# Patient Record
Sex: MALE | Race: WHITE | Employment: OTHER | URBAN - METROPOLITAN AREA
[De-identification: names, ages, dates, MRNs, and addresses within clinical notes are randomized per-mention and may not be internally consistent; named-entity substitution may affect disease eponyms.]

---

## 2019-12-04 ENCOUNTER — APPOINTMENT (OUTPATIENT)
Dept: GENERAL RADIOLOGY | Age: 69
DRG: 313 | End: 2019-12-04
Payer: OTHER GOVERNMENT

## 2019-12-04 ENCOUNTER — HOSPITAL ENCOUNTER (INPATIENT)
Age: 69
LOS: 1 days | Discharge: HOME | DRG: 313 | End: 2019-12-05
Attending: EMERGENCY MEDICINE | Admitting: INTERNAL MEDICINE
Payer: OTHER GOVERNMENT

## 2019-12-04 DIAGNOSIS — I24.9 ACUTE CORONARY SYNDROME (HCC): Primary | ICD-10-CM

## 2019-12-04 DIAGNOSIS — R07.9 ACUTE CHEST PAIN: ICD-10-CM

## 2019-12-04 PROBLEM — I21.4 NSTEMI (NON-ST ELEVATED MYOCARDIAL INFARCTION) (HCC): Status: ACTIVE | Noted: 2019-12-04

## 2019-12-04 LAB
ANION GAP SERPL CALCULATED.3IONS-SCNC: 21 MMOL/L (ref 3–16)
APTT: 33.6 SEC (ref 24.2–36.2)
BASOPHILS ABSOLUTE: 0.1 K/UL (ref 0–0.2)
BASOPHILS RELATIVE PERCENT: 1.7 %
BUN BLDV-MCNC: 9 MG/DL (ref 7–20)
CALCIUM SERPL-MCNC: 8.6 MG/DL (ref 8.3–10.6)
CHLORIDE BLD-SCNC: 103 MMOL/L (ref 99–110)
CO2: 19 MMOL/L (ref 21–32)
CREAT SERPL-MCNC: 0.9 MG/DL (ref 0.8–1.3)
EOSINOPHILS ABSOLUTE: 0 K/UL (ref 0–0.6)
EOSINOPHILS RELATIVE PERCENT: 0.1 %
GFR AFRICAN AMERICAN: >60
GFR NON-AFRICAN AMERICAN: >60
GLUCOSE BLD-MCNC: 94 MG/DL (ref 70–99)
HCT VFR BLD CALC: 39 % (ref 40.5–52.5)
HEMOGLOBIN: 13.1 G/DL (ref 13.5–17.5)
INR BLD: 1.05 (ref 0.86–1.14)
LYMPHOCYTES ABSOLUTE: 0.3 K/UL (ref 1–5.1)
LYMPHOCYTES RELATIVE PERCENT: 6.5 %
MCH RBC QN AUTO: 31.6 PG (ref 26–34)
MCHC RBC AUTO-ENTMCNC: 33.7 G/DL (ref 31–36)
MCV RBC AUTO: 93.8 FL (ref 80–100)
MONOCYTES ABSOLUTE: 0.3 K/UL (ref 0–1.3)
MONOCYTES RELATIVE PERCENT: 4.8 %
NEUTROPHILS ABSOLUTE: 4.7 K/UL (ref 1.7–7.7)
NEUTROPHILS RELATIVE PERCENT: 86.9 %
PDW BLD-RTO: 21.2 % (ref 12.4–15.4)
PLATELET # BLD: 269 K/UL (ref 135–450)
PMV BLD AUTO: 7.8 FL (ref 5–10.5)
POTASSIUM SERPL-SCNC: 4.3 MMOL/L (ref 3.5–5.1)
PRO-BNP: 379 PG/ML (ref 0–124)
PROTHROMBIN TIME: 12.2 SEC (ref 10–13.2)
RBC # BLD: 4.16 M/UL (ref 4.2–5.9)
SODIUM BLD-SCNC: 143 MMOL/L (ref 136–145)
TROPONIN: 0.05 NG/ML
TROPONIN: 0.05 NG/ML
WBC # BLD: 5.4 K/UL (ref 4–11)

## 2019-12-04 PROCEDURE — 6360000002 HC RX W HCPCS: Performed by: PHYSICIAN ASSISTANT

## 2019-12-04 PROCEDURE — 80048 BASIC METABOLIC PNL TOTAL CA: CPT

## 2019-12-04 PROCEDURE — 93005 ELECTROCARDIOGRAM TRACING: CPT | Performed by: EMERGENCY MEDICINE

## 2019-12-04 PROCEDURE — 85025 COMPLETE CBC W/AUTO DIFF WBC: CPT

## 2019-12-04 PROCEDURE — 85610 PROTHROMBIN TIME: CPT

## 2019-12-04 PROCEDURE — 36415 COLL VENOUS BLD VENIPUNCTURE: CPT

## 2019-12-04 PROCEDURE — 93005 ELECTROCARDIOGRAM TRACING: CPT | Performed by: PHYSICIAN ASSISTANT

## 2019-12-04 PROCEDURE — 71046 X-RAY EXAM CHEST 2 VIEWS: CPT

## 2019-12-04 PROCEDURE — 96374 THER/PROPH/DIAG INJ IV PUSH: CPT

## 2019-12-04 PROCEDURE — 99285 EMERGENCY DEPT VISIT HI MDM: CPT

## 2019-12-04 PROCEDURE — 84484 ASSAY OF TROPONIN QUANT: CPT

## 2019-12-04 PROCEDURE — 2580000003 HC RX 258: Performed by: PHYSICIAN ASSISTANT

## 2019-12-04 PROCEDURE — 83880 ASSAY OF NATRIURETIC PEPTIDE: CPT

## 2019-12-04 PROCEDURE — 6370000000 HC RX 637 (ALT 250 FOR IP): Performed by: EMERGENCY MEDICINE

## 2019-12-04 PROCEDURE — 2060000000 HC ICU INTERMEDIATE R&B

## 2019-12-04 PROCEDURE — 6370000000 HC RX 637 (ALT 250 FOR IP): Performed by: PHYSICIAN ASSISTANT

## 2019-12-04 PROCEDURE — 85730 THROMBOPLASTIN TIME PARTIAL: CPT

## 2019-12-04 RX ORDER — DOXYCYCLINE HYCLATE 100 MG/1
100 CAPSULE ORAL 2 TIMES DAILY
Status: ON HOLD | COMMUNITY
End: 2020-02-14 | Stop reason: HOSPADM

## 2019-12-04 RX ORDER — NIFEDIPINE 30 MG/1
30 TABLET, EXTENDED RELEASE ORAL DAILY
COMMUNITY

## 2019-12-04 RX ORDER — PANTOPRAZOLE SODIUM 40 MG/1
40 TABLET, DELAYED RELEASE ORAL DAILY
Status: ON HOLD | COMMUNITY
End: 2020-12-01 | Stop reason: ALTCHOICE

## 2019-12-04 RX ORDER — ASPIRIN 81 MG/1
324 TABLET, CHEWABLE ORAL ONCE
Status: COMPLETED | OUTPATIENT
Start: 2019-12-04 | End: 2019-12-04

## 2019-12-04 RX ORDER — TRAZODONE HYDROCHLORIDE 50 MG/1
50 TABLET ORAL NIGHTLY
COMMUNITY

## 2019-12-04 RX ORDER — METOPROLOL SUCCINATE 50 MG/1
50 TABLET, EXTENDED RELEASE ORAL DAILY
Status: ON HOLD | COMMUNITY
End: 2020-12-02 | Stop reason: SDUPTHER

## 2019-12-04 RX ORDER — 0.9 % SODIUM CHLORIDE 0.9 %
1000 INTRAVENOUS SOLUTION INTRAVENOUS ONCE
Status: COMPLETED | OUTPATIENT
Start: 2019-12-04 | End: 2019-12-04

## 2019-12-04 RX ADMIN — ASPIRIN 81 MG 324 MG: 81 TABLET ORAL at 18:30

## 2019-12-04 RX ADMIN — NITROGLYCERIN 0.5 INCH: 20 OINTMENT TOPICAL at 23:17

## 2019-12-04 RX ADMIN — SODIUM CHLORIDE 1000 ML: 9 INJECTION, SOLUTION INTRAVENOUS at 18:31

## 2019-12-04 RX ADMIN — ENOXAPARIN SODIUM 80 MG: 80 INJECTION SUBCUTANEOUS at 20:11

## 2019-12-04 ASSESSMENT — HEART SCORE: ECG: 1

## 2019-12-05 ENCOUNTER — APPOINTMENT (OUTPATIENT)
Dept: CT IMAGING | Age: 69
DRG: 313 | End: 2019-12-05
Payer: OTHER GOVERNMENT

## 2019-12-05 VITALS
WEIGHT: 174 LBS | SYSTOLIC BLOOD PRESSURE: 177 MMHG | HEART RATE: 104 BPM | BODY MASS INDEX: 26.37 KG/M2 | OXYGEN SATURATION: 94 % | RESPIRATION RATE: 16 BRPM | TEMPERATURE: 98.1 F | DIASTOLIC BLOOD PRESSURE: 96 MMHG | HEIGHT: 68 IN

## 2019-12-05 PROBLEM — R79.89 ELEVATED D-DIMER: Status: ACTIVE | Noted: 2019-12-05

## 2019-12-05 PROBLEM — F32.A DEPRESSION: Status: ACTIVE | Noted: 2019-12-05

## 2019-12-05 PROBLEM — K21.9 GERD (GASTROESOPHAGEAL REFLUX DISEASE): Status: ACTIVE | Noted: 2019-12-05

## 2019-12-05 PROBLEM — I10 HTN (HYPERTENSION): Status: ACTIVE | Noted: 2019-12-05

## 2019-12-05 LAB
A/G RATIO: 1.1 (ref 1.1–2.2)
ALBUMIN SERPL-MCNC: 3.9 G/DL (ref 3.4–5)
ALP BLD-CCNC: 112 U/L (ref 40–129)
ALT SERPL-CCNC: 55 U/L (ref 10–40)
ANION GAP SERPL CALCULATED.3IONS-SCNC: 20 MMOL/L (ref 3–16)
AST SERPL-CCNC: 78 U/L (ref 15–37)
BASOPHILS ABSOLUTE: 0.1 K/UL (ref 0–0.2)
BASOPHILS RELATIVE PERCENT: 1.5 %
BILIRUB SERPL-MCNC: 1 MG/DL (ref 0–1)
BUN BLDV-MCNC: 12 MG/DL (ref 7–20)
CALCIUM SERPL-MCNC: 8.3 MG/DL (ref 8.3–10.6)
CHLORIDE BLD-SCNC: 102 MMOL/L (ref 99–110)
CHOLESTEROL, TOTAL: 148 MG/DL (ref 0–199)
CO2: 19 MMOL/L (ref 21–32)
CREAT SERPL-MCNC: 0.9 MG/DL (ref 0.8–1.3)
D DIMER: 2348 NG/ML DDU (ref 0–229)
EKG ATRIAL RATE: 105 BPM
EKG ATRIAL RATE: 98 BPM
EKG ATRIAL RATE: 99 BPM
EKG DIAGNOSIS: NORMAL
EKG P AXIS: 57 DEGREES
EKG P AXIS: 63 DEGREES
EKG P AXIS: 65 DEGREES
EKG P-R INTERVAL: 146 MS
EKG P-R INTERVAL: 148 MS
EKG P-R INTERVAL: 162 MS
EKG Q-T INTERVAL: 344 MS
EKG Q-T INTERVAL: 354 MS
EKG Q-T INTERVAL: 358 MS
EKG QRS DURATION: 82 MS
EKG QTC CALCULATION (BAZETT): 451 MS
EKG QTC CALCULATION (BAZETT): 454 MS
EKG QTC CALCULATION (BAZETT): 459 MS
EKG R AXIS: -1 DEGREES
EKG R AXIS: -18 DEGREES
EKG R AXIS: -24 DEGREES
EKG T AXIS: 15 DEGREES
EKG T AXIS: 23 DEGREES
EKG T AXIS: 31 DEGREES
EKG VENTRICULAR RATE: 105 BPM
EKG VENTRICULAR RATE: 98 BPM
EKG VENTRICULAR RATE: 99 BPM
EOSINOPHILS ABSOLUTE: 0 K/UL (ref 0–0.6)
EOSINOPHILS RELATIVE PERCENT: 0 %
GFR AFRICAN AMERICAN: >60
GFR NON-AFRICAN AMERICAN: >60
GLOBULIN: 3.4 G/DL
GLUCOSE BLD-MCNC: 126 MG/DL (ref 70–99)
GLUCOSE BLD-MCNC: 85 MG/DL (ref 70–99)
HCT VFR BLD CALC: 36.3 % (ref 40.5–52.5)
HDLC SERPL-MCNC: 65 MG/DL (ref 40–60)
HEMOGLOBIN: 12.2 G/DL (ref 13.5–17.5)
LDL CHOLESTEROL CALCULATED: 70 MG/DL
LEFT VENTRICULAR EJECTION FRACTION HIGH VALUE: 70 %
LEFT VENTRICULAR EJECTION FRACTION MODE: NORMAL
LV EF: 70 %
LV EF: 77 %
LVEF MODALITY: NORMAL
LVEF MODALITY: NORMAL
LYMPHOCYTES ABSOLUTE: 0.3 K/UL (ref 1–5.1)
LYMPHOCYTES RELATIVE PERCENT: 5.8 %
MCH RBC QN AUTO: 31.7 PG (ref 26–34)
MCHC RBC AUTO-ENTMCNC: 33.6 G/DL (ref 31–36)
MCV RBC AUTO: 94.3 FL (ref 80–100)
MONOCYTES ABSOLUTE: 0.5 K/UL (ref 0–1.3)
MONOCYTES RELATIVE PERCENT: 10 %
NEUTROPHILS ABSOLUTE: 4.4 K/UL (ref 1.7–7.7)
NEUTROPHILS RELATIVE PERCENT: 82.7 %
PDW BLD-RTO: 21.4 % (ref 12.4–15.4)
PERFORMED ON: ABNORMAL
PLATELET # BLD: 237 K/UL (ref 135–450)
PMV BLD AUTO: 8.5 FL (ref 5–10.5)
POTASSIUM REFLEX MAGNESIUM: 4.2 MMOL/L (ref 3.5–5.1)
RBC # BLD: 3.85 M/UL (ref 4.2–5.9)
SODIUM BLD-SCNC: 141 MMOL/L (ref 136–145)
TOTAL PROTEIN: 7.3 G/DL (ref 6.4–8.2)
TRIGL SERPL-MCNC: 64 MG/DL (ref 0–150)
TROPONIN: 0.05 NG/ML
TROPONIN: 0.05 NG/ML
VLDLC SERPL CALC-MCNC: 13 MG/DL
WBC # BLD: 5.3 K/UL (ref 4–11)

## 2019-12-05 PROCEDURE — 78452 HT MUSCLE IMAGE SPECT MULT: CPT | Performed by: INTERNAL MEDICINE

## 2019-12-05 PROCEDURE — 6360000002 HC RX W HCPCS: Performed by: INTERNAL MEDICINE

## 2019-12-05 PROCEDURE — 80053 COMPREHEN METABOLIC PANEL: CPT

## 2019-12-05 PROCEDURE — 93010 ELECTROCARDIOGRAM REPORT: CPT | Performed by: INTERNAL MEDICINE

## 2019-12-05 PROCEDURE — A9502 TC99M TETROFOSMIN: HCPCS | Performed by: INTERNAL MEDICINE

## 2019-12-05 PROCEDURE — 93017 CV STRESS TEST TRACING ONLY: CPT | Performed by: INTERNAL MEDICINE

## 2019-12-05 PROCEDURE — 93306 TTE W/DOPPLER COMPLETE: CPT

## 2019-12-05 PROCEDURE — 93005 ELECTROCARDIOGRAM TRACING: CPT | Performed by: INTERNAL MEDICINE

## 2019-12-05 PROCEDURE — 3430000000 HC RX DIAGNOSTIC RADIOPHARMACEUTICAL: Performed by: INTERNAL MEDICINE

## 2019-12-05 PROCEDURE — 96375 TX/PRO/DX INJ NEW DRUG ADDON: CPT

## 2019-12-05 PROCEDURE — 6360000004 HC RX CONTRAST MEDICATION: Performed by: INTERNAL MEDICINE

## 2019-12-05 PROCEDURE — 84484 ASSAY OF TROPONIN QUANT: CPT

## 2019-12-05 PROCEDURE — 6370000000 HC RX 637 (ALT 250 FOR IP): Performed by: INTERNAL MEDICINE

## 2019-12-05 PROCEDURE — 99222 1ST HOSP IP/OBS MODERATE 55: CPT | Performed by: INTERNAL MEDICINE

## 2019-12-05 PROCEDURE — 80061 LIPID PANEL: CPT

## 2019-12-05 PROCEDURE — 2580000003 HC RX 258: Performed by: INTERNAL MEDICINE

## 2019-12-05 PROCEDURE — 85379 FIBRIN DEGRADATION QUANT: CPT

## 2019-12-05 PROCEDURE — 71260 CT THORAX DX C+: CPT

## 2019-12-05 PROCEDURE — 85025 COMPLETE CBC W/AUTO DIFF WBC: CPT

## 2019-12-05 PROCEDURE — 36415 COLL VENOUS BLD VENIPUNCTURE: CPT

## 2019-12-05 RX ORDER — SODIUM CHLORIDE 0.9 % (FLUSH) 0.9 %
10 SYRINGE (ML) INJECTION PRN
Status: DISCONTINUED | OUTPATIENT
Start: 2019-12-05 | End: 2019-12-05 | Stop reason: HOSPADM

## 2019-12-05 RX ORDER — NITROGLYCERIN 0.4 MG/1
0.4 TABLET SUBLINGUAL EVERY 5 MIN PRN
Status: DISCONTINUED | OUTPATIENT
Start: 2019-12-05 | End: 2019-12-05 | Stop reason: HOSPADM

## 2019-12-05 RX ORDER — SODIUM CHLORIDE 0.9 % (FLUSH) 0.9 %
10 SYRINGE (ML) INJECTION EVERY 12 HOURS SCHEDULED
Status: DISCONTINUED | OUTPATIENT
Start: 2019-12-05 | End: 2019-12-05 | Stop reason: HOSPADM

## 2019-12-05 RX ORDER — POTASSIUM CHLORIDE 7.45 MG/ML
10 INJECTION INTRAVENOUS PRN
Status: DISCONTINUED | OUTPATIENT
Start: 2019-12-05 | End: 2019-12-05

## 2019-12-05 RX ORDER — LORAZEPAM 2 MG/ML
1 INJECTION INTRAMUSCULAR EVERY 4 HOURS PRN
Status: DISCONTINUED | OUTPATIENT
Start: 2019-12-05 | End: 2019-12-05 | Stop reason: HOSPADM

## 2019-12-05 RX ORDER — AMINOPHYLLINE DIHYDRATE 25 MG/ML
100 INJECTION, SOLUTION INTRAVENOUS ONCE
Status: COMPLETED | OUTPATIENT
Start: 2019-12-05 | End: 2019-12-05

## 2019-12-05 RX ORDER — ONDANSETRON 2 MG/ML
INJECTION INTRAMUSCULAR; INTRAVENOUS
Status: DISPENSED
Start: 2019-12-05 | End: 2019-12-05

## 2019-12-05 RX ORDER — 0.9 % SODIUM CHLORIDE 0.9 %
500 INTRAVENOUS SOLUTION INTRAVENOUS PRN
Status: DISCONTINUED | OUTPATIENT
Start: 2019-12-05 | End: 2019-12-05 | Stop reason: HOSPADM

## 2019-12-05 RX ORDER — METOPROLOL SUCCINATE 50 MG/1
50 TABLET, EXTENDED RELEASE ORAL DAILY
Status: DISCONTINUED | OUTPATIENT
Start: 2019-12-05 | End: 2019-12-05 | Stop reason: HOSPADM

## 2019-12-05 RX ORDER — POTASSIUM CHLORIDE 20 MEQ/1
40 TABLET, EXTENDED RELEASE ORAL PRN
Status: DISCONTINUED | OUTPATIENT
Start: 2019-12-05 | End: 2019-12-05 | Stop reason: HOSPADM

## 2019-12-05 RX ORDER — 0.9 % SODIUM CHLORIDE 0.9 %
500 INTRAVENOUS SOLUTION INTRAVENOUS PRN
Status: DISCONTINUED | OUTPATIENT
Start: 2019-12-05 | End: 2019-12-05

## 2019-12-05 RX ORDER — DOXYCYCLINE HYCLATE 100 MG
100 TABLET ORAL 2 TIMES DAILY
Status: DISCONTINUED | OUTPATIENT
Start: 2019-12-05 | End: 2019-12-05 | Stop reason: HOSPADM

## 2019-12-05 RX ORDER — ONDANSETRON 2 MG/ML
4 INJECTION INTRAMUSCULAR; INTRAVENOUS EVERY 6 HOURS PRN
Status: DISCONTINUED | OUTPATIENT
Start: 2019-12-05 | End: 2019-12-05 | Stop reason: HOSPADM

## 2019-12-05 RX ORDER — HYDRALAZINE HYDROCHLORIDE 20 MG/ML
10 INJECTION INTRAMUSCULAR; INTRAVENOUS EVERY 6 HOURS PRN
Status: DISCONTINUED | OUTPATIENT
Start: 2019-12-05 | End: 2019-12-05

## 2019-12-05 RX ORDER — NIFEDIPINE 30 MG/1
30 TABLET, EXTENDED RELEASE ORAL DAILY
Status: DISCONTINUED | OUTPATIENT
Start: 2019-12-05 | End: 2019-12-05 | Stop reason: HOSPADM

## 2019-12-05 RX ORDER — POTASSIUM CHLORIDE 7.45 MG/ML
10 INJECTION INTRAVENOUS PRN
Status: DISCONTINUED | OUTPATIENT
Start: 2019-12-05 | End: 2019-12-05 | Stop reason: HOSPADM

## 2019-12-05 RX ORDER — HYDRALAZINE HYDROCHLORIDE 20 MG/ML
10 INJECTION INTRAMUSCULAR; INTRAVENOUS EVERY 6 HOURS PRN
Status: DISCONTINUED | OUTPATIENT
Start: 2019-12-05 | End: 2019-12-05 | Stop reason: HOSPADM

## 2019-12-05 RX ORDER — POTASSIUM CHLORIDE 20 MEQ/1
40 TABLET, EXTENDED RELEASE ORAL PRN
Status: DISCONTINUED | OUTPATIENT
Start: 2019-12-05 | End: 2019-12-05

## 2019-12-05 RX ORDER — TRAZODONE HYDROCHLORIDE 50 MG/1
50 TABLET ORAL NIGHTLY
Status: DISCONTINUED | OUTPATIENT
Start: 2019-12-05 | End: 2019-12-05 | Stop reason: HOSPADM

## 2019-12-05 RX ORDER — PANTOPRAZOLE SODIUM 40 MG/1
40 TABLET, DELAYED RELEASE ORAL DAILY
Status: DISCONTINUED | OUTPATIENT
Start: 2019-12-05 | End: 2019-12-05 | Stop reason: HOSPADM

## 2019-12-05 RX ORDER — ATORVASTATIN CALCIUM 40 MG/1
40 TABLET, FILM COATED ORAL NIGHTLY
Status: DISCONTINUED | OUTPATIENT
Start: 2019-12-05 | End: 2019-12-05 | Stop reason: HOSPADM

## 2019-12-05 RX ADMIN — ONDANSETRON 4 MG: 2 INJECTION INTRAMUSCULAR; INTRAVENOUS at 07:39

## 2019-12-05 RX ADMIN — Medication 10 ML: at 09:40

## 2019-12-05 RX ADMIN — IOPAMIDOL 75 ML: 755 INJECTION, SOLUTION INTRAVENOUS at 18:04

## 2019-12-05 RX ADMIN — ONDANSETRON 4 MG: 2 INJECTION INTRAMUSCULAR; INTRAVENOUS at 00:35

## 2019-12-05 RX ADMIN — TETROFOSMIN 10 MILLICURIE: 1.38 INJECTION, POWDER, LYOPHILIZED, FOR SOLUTION INTRAVENOUS at 13:25

## 2019-12-05 RX ADMIN — AMINOPHYLLINE 100 MG: 25 INJECTION, SOLUTION INTRAVENOUS at 14:52

## 2019-12-05 RX ADMIN — HYDRALAZINE HYDROCHLORIDE 10 MG: 20 INJECTION INTRAMUSCULAR; INTRAVENOUS at 08:29

## 2019-12-05 RX ADMIN — REGADENOSON 0.4 MG: 0.08 INJECTION, SOLUTION INTRAVENOUS at 14:46

## 2019-12-05 RX ADMIN — METRONIDAZOLE: 7.5 CREAM TOPICAL at 11:41

## 2019-12-05 RX ADMIN — TETROFOSMIN 30 MILLICURIE: 1.38 INJECTION, POWDER, LYOPHILIZED, FOR SOLUTION INTRAVENOUS at 14:45

## 2019-12-05 ASSESSMENT — ENCOUNTER SYMPTOMS
ABDOMINAL PAIN: 0
RHINORRHEA: 0
CHEST TIGHTNESS: 1
BACK PAIN: 0
EYE PAIN: 0
SHORTNESS OF BREATH: 0
COUGH: 0
VOMITING: 0
DIARRHEA: 0
NAUSEA: 0
SHORTNESS OF BREATH: 1
EYE REDNESS: 0

## 2019-12-05 ASSESSMENT — PAIN SCALES - GENERAL
PAINLEVEL_OUTOF10: 0

## 2020-02-12 ENCOUNTER — APPOINTMENT (OUTPATIENT)
Dept: GENERAL RADIOLOGY | Age: 70
DRG: 101 | End: 2020-02-12
Payer: MEDICARE

## 2020-02-12 ENCOUNTER — HOSPITAL ENCOUNTER (INPATIENT)
Age: 70
LOS: 2 days | Discharge: HOME | DRG: 101 | End: 2020-02-14
Attending: EMERGENCY MEDICINE | Admitting: INTERNAL MEDICINE
Payer: MEDICARE

## 2020-02-12 ENCOUNTER — APPOINTMENT (OUTPATIENT)
Dept: CT IMAGING | Age: 70
DRG: 101 | End: 2020-02-12
Payer: MEDICARE

## 2020-02-12 PROBLEM — R56.9 SEIZURE (HCC): Status: ACTIVE | Noted: 2020-02-12

## 2020-02-12 LAB
A/G RATIO: 1.1 (ref 1.1–2.2)
ACETAMINOPHEN LEVEL: <5 UG/ML (ref 10–30)
ALBUMIN SERPL-MCNC: 3.7 G/DL (ref 3.4–5)
ALP BLD-CCNC: 82 U/L (ref 40–129)
ALT SERPL-CCNC: 27 U/L (ref 10–40)
AMMONIA: 104 UMOL/L (ref 16–60)
AMPHETAMINE SCREEN, URINE: NORMAL
ANION GAP SERPL CALCULATED.3IONS-SCNC: 30 MMOL/L (ref 3–16)
APTT: 21.3 SEC (ref 24.2–36.2)
AST SERPL-CCNC: 58 U/L (ref 15–37)
BARBITURATE SCREEN URINE: NORMAL
BASE EXCESS VENOUS: -13.8 MMOL/L (ref -3–3)
BASOPHILS ABSOLUTE: 0 K/UL (ref 0–0.2)
BASOPHILS RELATIVE PERCENT: 0.6 %
BENZODIAZEPINE SCREEN, URINE: NORMAL
BILIRUB SERPL-MCNC: 1.1 MG/DL (ref 0–1)
BILIRUBIN URINE: NEGATIVE
BLOOD, URINE: ABNORMAL
BUN BLDV-MCNC: 7 MG/DL (ref 7–20)
CALCIUM SERPL-MCNC: 7.6 MG/DL (ref 8.3–10.6)
CANNABINOID SCREEN URINE: NORMAL
CARBOXYHEMOGLOBIN: 3.8 % (ref 0–1.5)
CHLORIDE BLD-SCNC: 101 MMOL/L (ref 99–110)
CLARITY: ABNORMAL
CO2: 11 MMOL/L (ref 21–32)
COCAINE METABOLITE SCREEN URINE: NORMAL
COLOR: ABNORMAL
CREAT SERPL-MCNC: 0.8 MG/DL (ref 0.8–1.3)
EKG ATRIAL RATE: 100 BPM
EKG DIAGNOSIS: NORMAL
EKG P AXIS: 54 DEGREES
EKG P-R INTERVAL: 142 MS
EKG Q-T INTERVAL: 414 MS
EKG QRS DURATION: 88 MS
EKG QTC CALCULATION (BAZETT): 534 MS
EKG R AXIS: 7 DEGREES
EKG T AXIS: 13 DEGREES
EKG VENTRICULAR RATE: 100 BPM
EOSINOPHILS ABSOLUTE: 0 K/UL (ref 0–0.6)
EOSINOPHILS RELATIVE PERCENT: 0 %
EPITHELIAL CELLS, UA: 1 /HPF (ref 0–5)
ETHANOL: NORMAL MG/DL (ref 0–0.08)
GFR AFRICAN AMERICAN: >60
GFR NON-AFRICAN AMERICAN: >60
GLOBULIN: 3.3 G/DL
GLUCOSE BLD-MCNC: 162 MG/DL (ref 70–99)
GLUCOSE URINE: NEGATIVE MG/DL
HCO3 VENOUS: 10.3 MMOL/L (ref 23–29)
HCT VFR BLD CALC: 40.4 % (ref 40.5–52.5)
HEMOGLOBIN: 13.3 G/DL (ref 13.5–17.5)
HYALINE CASTS: 1 /LPF (ref 0–8)
INR BLD: 1.32 (ref 0.86–1.14)
KETONES, URINE: 15 MG/DL
LACTIC ACID: 1.5 MMOL/L (ref 0.4–2)
LACTIC ACID: 10.9 MMOL/L (ref 0.4–2)
LEUKOCYTE ESTERASE, URINE: NEGATIVE
LIPASE: 37 U/L (ref 13–60)
LYMPHOCYTES ABSOLUTE: 0.8 K/UL (ref 1–5.1)
LYMPHOCYTES RELATIVE PERCENT: 12.7 %
Lab: NORMAL
MCH RBC QN AUTO: 31.9 PG (ref 26–34)
MCHC RBC AUTO-ENTMCNC: 32.8 G/DL (ref 31–36)
MCV RBC AUTO: 97.1 FL (ref 80–100)
METHADONE SCREEN, URINE: NORMAL
METHEMOGLOBIN VENOUS: 0.2 %
MICROSCOPIC EXAMINATION: YES
MONOCYTES ABSOLUTE: 1 K/UL (ref 0–1.3)
MONOCYTES RELATIVE PERCENT: 17.3 %
NEUTROPHILS ABSOLUTE: 4.1 K/UL (ref 1.7–7.7)
NEUTROPHILS RELATIVE PERCENT: 69.4 %
NITRITE, URINE: NEGATIVE
O2 CONTENT, VEN: 16 VOL %
O2 SAT, VEN: 99 %
O2 THERAPY: ABNORMAL
OPIATE SCREEN URINE: NORMAL
OSMOLALITY: 294 MOSM/KG (ref 280–301)
OXYCODONE URINE: NORMAL
PCO2, VEN: 19.9 MMHG (ref 40–50)
PDW BLD-RTO: 17.2 % (ref 12.4–15.4)
PH UA: 6.5
PH UA: 6.5 (ref 5–8)
PH VENOUS: 7.32 (ref 7.35–7.45)
PHENCYCLIDINE SCREEN URINE: NORMAL
PLATELET # BLD: 118 K/UL (ref 135–450)
PMV BLD AUTO: 8.2 FL (ref 5–10.5)
PO2, VEN: 188 MMHG (ref 25–40)
POTASSIUM SERPL-SCNC: 2.4 MMOL/L (ref 3.5–5.1)
PRO-BNP: 1083 PG/ML (ref 0–124)
PROPOXYPHENE SCREEN: NORMAL
PROTEIN UA: 100 MG/DL
PROTHROMBIN TIME: 15.3 SEC (ref 10–13.2)
RBC # BLD: 4.16 M/UL (ref 4.2–5.9)
RBC UA: 17 /HPF (ref 0–4)
SALICYLATE, SERUM: <0.3 MG/DL (ref 15–30)
SODIUM BLD-SCNC: 142 MMOL/L (ref 136–145)
SPECIFIC GRAVITY UA: 1.02 (ref 1–1.03)
TCO2 CALC VENOUS: 24 MMOL/L
TOTAL CK: 367 U/L (ref 39–308)
TOTAL PROTEIN: 7 G/DL (ref 6.4–8.2)
TROPONIN: 0.02 NG/ML
URINE REFLEX TO CULTURE: ABNORMAL
URINE TYPE: ABNORMAL
UROBILINOGEN, URINE: 1 E.U./DL
WBC # BLD: 6 K/UL (ref 4–11)
WBC UA: 3 /HPF (ref 0–5)

## 2020-02-12 PROCEDURE — 2580000003 HC RX 258: Performed by: INTERNAL MEDICINE

## 2020-02-12 PROCEDURE — 83605 ASSAY OF LACTIC ACID: CPT

## 2020-02-12 PROCEDURE — 82803 BLOOD GASES ANY COMBINATION: CPT

## 2020-02-12 PROCEDURE — 94760 N-INVAS EAR/PLS OXIMETRY 1: CPT

## 2020-02-12 PROCEDURE — 71045 X-RAY EXAM CHEST 1 VIEW: CPT

## 2020-02-12 PROCEDURE — 90471 IMMUNIZATION ADMIN: CPT | Performed by: PHYSICIAN ASSISTANT

## 2020-02-12 PROCEDURE — 6370000000 HC RX 637 (ALT 250 FOR IP): Performed by: PHYSICIAN ASSISTANT

## 2020-02-12 PROCEDURE — G0480 DRUG TEST DEF 1-7 CLASSES: HCPCS

## 2020-02-12 PROCEDURE — 80307 DRUG TEST PRSMV CHEM ANLYZR: CPT

## 2020-02-12 PROCEDURE — 80053 COMPREHEN METABOLIC PANEL: CPT

## 2020-02-12 PROCEDURE — 99291 CRITICAL CARE FIRST HOUR: CPT

## 2020-02-12 PROCEDURE — 85025 COMPLETE CBC W/AUTO DIFF WBC: CPT

## 2020-02-12 PROCEDURE — 90715 TDAP VACCINE 7 YRS/> IM: CPT | Performed by: PHYSICIAN ASSISTANT

## 2020-02-12 PROCEDURE — 2700000000 HC OXYGEN THERAPY PER DAY

## 2020-02-12 PROCEDURE — 2580000003 HC RX 258: Performed by: PHYSICIAN ASSISTANT

## 2020-02-12 PROCEDURE — 93005 ELECTROCARDIOGRAM TRACING: CPT | Performed by: EMERGENCY MEDICINE

## 2020-02-12 PROCEDURE — 2060000000 HC ICU INTERMEDIATE R&B

## 2020-02-12 PROCEDURE — 70450 CT HEAD/BRAIN W/O DYE: CPT

## 2020-02-12 PROCEDURE — 84484 ASSAY OF TROPONIN QUANT: CPT

## 2020-02-12 PROCEDURE — 72125 CT NECK SPINE W/O DYE: CPT

## 2020-02-12 PROCEDURE — 82140 ASSAY OF AMMONIA: CPT

## 2020-02-12 PROCEDURE — 36415 COLL VENOUS BLD VENIPUNCTURE: CPT

## 2020-02-12 PROCEDURE — 6360000002 HC RX W HCPCS: Performed by: PHYSICIAN ASSISTANT

## 2020-02-12 PROCEDURE — 81001 URINALYSIS AUTO W/SCOPE: CPT

## 2020-02-12 PROCEDURE — 2500000003 HC RX 250 WO HCPCS: Performed by: PHYSICIAN ASSISTANT

## 2020-02-12 PROCEDURE — 6360000002 HC RX W HCPCS: Performed by: INTERNAL MEDICINE

## 2020-02-12 PROCEDURE — 83690 ASSAY OF LIPASE: CPT

## 2020-02-12 PROCEDURE — 96375 TX/PRO/DX INJ NEW DRUG ADDON: CPT

## 2020-02-12 PROCEDURE — 93010 ELECTROCARDIOGRAM REPORT: CPT | Performed by: INTERNAL MEDICINE

## 2020-02-12 PROCEDURE — 85730 THROMBOPLASTIN TIME PARTIAL: CPT

## 2020-02-12 PROCEDURE — 83880 ASSAY OF NATRIURETIC PEPTIDE: CPT

## 2020-02-12 PROCEDURE — 82550 ASSAY OF CK (CPK): CPT

## 2020-02-12 PROCEDURE — 83930 ASSAY OF BLOOD OSMOLALITY: CPT

## 2020-02-12 PROCEDURE — 85610 PROTHROMBIN TIME: CPT

## 2020-02-12 PROCEDURE — 96365 THER/PROPH/DIAG IV INF INIT: CPT

## 2020-02-12 RX ORDER — LACTULOSE 10 G/15ML
20 SOLUTION ORAL ONCE
Status: COMPLETED | OUTPATIENT
Start: 2020-02-12 | End: 2020-02-12

## 2020-02-12 RX ORDER — SODIUM CHLORIDE 0.9 % (FLUSH) 0.9 %
10 SYRINGE (ML) INJECTION EVERY 12 HOURS SCHEDULED
Status: DISCONTINUED | OUTPATIENT
Start: 2020-02-12 | End: 2020-02-14 | Stop reason: HOSPADM

## 2020-02-12 RX ORDER — ONDANSETRON 2 MG/ML
4 INJECTION INTRAMUSCULAR; INTRAVENOUS ONCE
Status: COMPLETED | OUTPATIENT
Start: 2020-02-12 | End: 2020-02-12

## 2020-02-12 RX ORDER — POTASSIUM CHLORIDE 7.45 MG/ML
10 INJECTION INTRAVENOUS ONCE
Status: COMPLETED | OUTPATIENT
Start: 2020-02-12 | End: 2020-02-12

## 2020-02-12 RX ORDER — NIFEDIPINE 30 MG/1
30 TABLET, EXTENDED RELEASE ORAL DAILY
Status: DISCONTINUED | OUTPATIENT
Start: 2020-02-12 | End: 2020-02-14 | Stop reason: HOSPADM

## 2020-02-12 RX ORDER — SODIUM CHLORIDE 9 MG/ML
INJECTION, SOLUTION INTRAVENOUS CONTINUOUS
Status: DISCONTINUED | OUTPATIENT
Start: 2020-02-12 | End: 2020-02-14 | Stop reason: HOSPADM

## 2020-02-12 RX ORDER — SODIUM CHLORIDE 0.9 % (FLUSH) 0.9 %
10 SYRINGE (ML) INJECTION PRN
Status: DISCONTINUED | OUTPATIENT
Start: 2020-02-12 | End: 2020-02-14 | Stop reason: HOSPADM

## 2020-02-12 RX ORDER — ACETAMINOPHEN 325 MG/1
650 TABLET ORAL EVERY 4 HOURS PRN
Status: DISCONTINUED | OUTPATIENT
Start: 2020-02-12 | End: 2020-02-14 | Stop reason: HOSPADM

## 2020-02-12 RX ORDER — 0.9 % SODIUM CHLORIDE 0.9 %
1000 INTRAVENOUS SOLUTION INTRAVENOUS ONCE
Status: COMPLETED | OUTPATIENT
Start: 2020-02-12 | End: 2020-02-12

## 2020-02-12 RX ORDER — PANTOPRAZOLE SODIUM 40 MG/1
40 TABLET, DELAYED RELEASE ORAL DAILY
Status: DISCONTINUED | OUTPATIENT
Start: 2020-02-13 | End: 2020-02-14 | Stop reason: HOSPADM

## 2020-02-12 RX ORDER — POTASSIUM CHLORIDE 7.45 MG/ML
10 INJECTION INTRAVENOUS PRN
Status: DISCONTINUED | OUTPATIENT
Start: 2020-02-12 | End: 2020-02-14 | Stop reason: HOSPADM

## 2020-02-12 RX ORDER — LORAZEPAM 2 MG/ML
1 INJECTION INTRAMUSCULAR ONCE
Status: COMPLETED | OUTPATIENT
Start: 2020-02-12 | End: 2020-02-12

## 2020-02-12 RX ORDER — ONDANSETRON 2 MG/ML
4 INJECTION INTRAMUSCULAR; INTRAVENOUS EVERY 6 HOURS PRN
Status: DISCONTINUED | OUTPATIENT
Start: 2020-02-12 | End: 2020-02-14 | Stop reason: HOSPADM

## 2020-02-12 RX ORDER — METOPROLOL SUCCINATE 50 MG/1
50 TABLET, EXTENDED RELEASE ORAL DAILY
Status: DISCONTINUED | OUTPATIENT
Start: 2020-02-12 | End: 2020-02-14 | Stop reason: HOSPADM

## 2020-02-12 RX ORDER — LEVETIRACETAM 10 MG/ML
1000 INJECTION INTRAVASCULAR ONCE
Status: COMPLETED | OUTPATIENT
Start: 2020-02-12 | End: 2020-02-12

## 2020-02-12 RX ADMIN — LEVETIRACETAM 1000 MG: 10 INJECTION INTRAVENOUS at 12:48

## 2020-02-12 RX ADMIN — FOLIC ACID: 5 INJECTION, SOLUTION INTRAMUSCULAR; INTRAVENOUS; SUBCUTANEOUS at 13:23

## 2020-02-12 RX ADMIN — SODIUM CHLORIDE: 9 INJECTION, SOLUTION INTRAVENOUS at 16:41

## 2020-02-12 RX ADMIN — ONDANSETRON 4 MG: 2 INJECTION INTRAMUSCULAR; INTRAVENOUS at 11:55

## 2020-02-12 RX ADMIN — Medication 10 ML: at 22:00

## 2020-02-12 RX ADMIN — LACTULOSE 20 G: 20 SOLUTION ORAL at 14:10

## 2020-02-12 RX ADMIN — ENOXAPARIN SODIUM 40 MG: 40 INJECTION SUBCUTANEOUS at 22:00

## 2020-02-12 RX ADMIN — POTASSIUM CHLORIDE 10 MEQ: 7.46 INJECTION, SOLUTION INTRAVENOUS at 13:09

## 2020-02-12 RX ADMIN — TETANUS TOXOID, REDUCED DIPHTHERIA TOXOID AND ACELLULAR PERTUSSIS VACCINE, ADSORBED 0.5 ML: 5; 2.5; 8; 8; 2.5 SUSPENSION INTRAMUSCULAR at 12:23

## 2020-02-12 RX ADMIN — SODIUM CHLORIDE 1000 ML: 9 INJECTION, SOLUTION INTRAVENOUS at 11:54

## 2020-02-12 RX ADMIN — LORAZEPAM 1 MG: 2 INJECTION INTRAMUSCULAR; INTRAVENOUS at 12:48

## 2020-02-12 ASSESSMENT — PAIN SCALES - GENERAL
PAINLEVEL_OUTOF10: 0
PAINLEVEL_OUTOF10: 0

## 2020-02-12 ASSESSMENT — ENCOUNTER SYMPTOMS
ABDOMINAL PAIN: 0
SHORTNESS OF BREATH: 0
VOMITING: 0
NAUSEA: 0
RHINORRHEA: 0
COUGH: 0
DIARRHEA: 0

## 2020-02-12 NOTE — ED NOTES
Bed: 07  Expected date:   Expected time:   Means of arrival:   Comments:  Danny Diaz RN  02/12/20 1113

## 2020-02-12 NOTE — ED NOTES
Pt medicated with Zofran for nausea. IVF infusing per MAR. Pt becoming more alert. Pt asking what happened. Explained to him that the ambulance found him down in the grass of a parking lot between [facility] and [employer]. Pt able to state that is in [place] neighborhood. Pt reports that he is former  and that he doesn't drive. Pt still unable to remember anything. Unable to recall the last thing he remembers either.      Lien Sahni RN  02/12/20 1200

## 2020-02-12 NOTE — ED NOTES
Pt given urinal for urine specimen. Pt reports that he remembers walked to the [employer] to get some money out and then realized that once he got there he forgot his debit card.       Richard Snyder RN  02/12/20 7663

## 2020-02-12 NOTE — ED NOTES
Report given to Evie Woodruff 3T RN. All questions answered. Pt taken to 3T in stretcher with all of his belongings.       Lukas Heath RN  02/12/20 1439       Lukas Heath RN  02/12/20 1442

## 2020-02-12 NOTE — ED NOTES
Seizure precautions in place. Pt with episode of blood emesis (likely due to all the blood he swallowed). Pt reports feeling better now. Given emesis bag just in case. Xray at bedside.      Kimberly Chatman RN  02/12/20 5193

## 2020-02-12 NOTE — ED PROVIDER NOTES
[facility]        Pt Name: Laura Harden  MRN: [id number]  [place] 1950  Date of evaluation: 2/12/2020  Provider: Mireya Ott PA-C  PCP: No primary care provider on file. This patient was seen and evaluated by the attending physician Sandy Gutierrez MD.      [address]       Chief Complaint   Patient presents with   Lorretta      brought in by [facility] squad. found in the parking lot in the grass. pt intially minimally responsive. given narcan. more alert upon arrival, still very confused. . blood noted to mouth, pt alert to self only. HISTORY OF PRESENT ILLNESS   (Location, Timing/Onset, Context/Setting, Quality, Duration, Modifying Factors, Severity, Associated Signs and Symptoms)  Note limiting factors. Laura Harden is a 71 y.o. male who presents to the emergency department today for evaluation for a fall, and possible altered mental status. Per EMS, the found the patient lying in the grass, and apparently Narcan was given and the patient did seem to wake up. The patient is unsure exactly what happened. When patient arrived to the ED he does have blood noted to the outside of his mouth and that it appears that he has bitten his tongue. The patient states that he is actually asymptomatic at this time and he has no headaches. No visual changes. He has no numbness, tingling or weakness. Patient states that he does drink 2 cups of water a day and he cannot remember if he drink this morning. The patient states that he just cannot remember what happened.     Throughout his emergency room stay, the patient seems to become more alert, he states that he does remember waking up this morning, and he states that he does remember watching TV, and he states that he remembers walking to the bank to get money out but he realized that he did not have his card and he states that he cannot remember anything after that. Patient denies any known history of seizures, he denies any recent illnesses including fever, cough, vomiting or diarrhea. He has no chest pain or shortness of breath at this time. He otherwise has no complaints    Nursing Notes were all reviewed and agreed with or any disagreements were addressed in the HPI. REVIEW OF SYSTEMS    (2-9 systems for level 4, 10 or more for level 5)     Review of Systems   Constitutional: Negative for activity change, appetite change, chills and fever. HENT: Negative for congestion and rhinorrhea. Eyes: Negative for visual disturbance. Respiratory: Negative for cough and shortness of breath. Cardiovascular: Negative for chest pain. Gastrointestinal: Negative for abdominal pain, diarrhea, nausea and vomiting. Genitourinary: Negative for difficulty urinating, dysuria and hematuria. Psychiatric/Behavioral: Positive for confusion. Positives and Pertinent negatives as per HPI. Except as noted above in the ROS, all other systems were reviewed and negative. PAST MEDICAL HISTORY     Past Medical History:   Diagnosis Date    Depression          SURGICAL HISTORY   History reviewed. No pertinent surgical history. CURRENTMEDICATIONS       Previous Medications    DOXYCYCLINE HYCLATE (VIBRAMYCIN) 100 MG CAPSULE    Take 100 mg by mouth 2 times daily    METOPROLOL SUCCINATE (TOPROL XL) 50 MG EXTENDED RELEASE TABLET    Take 50 mg by mouth daily    METRONIDAZOLE (METROCREAM) 0.75 % CREAM    Apply topically 2 times daily Apply topically 2 times daily. NIFEDIPINE (PROCARDIA XL) 30 MG EXTENDED RELEASE TABLET    Take 30 mg by mouth daily    PANTOPRAZOLE (PROTONIX) 40 MG TABLET    Take 40 mg by mouth daily    SERTRALINE (ZOLOFT) 50 MG TABLET    Take 50 mg by mouth daily    TRAZODONE (DESYREL) 50 MG TABLET    Take 50 mg by mouth nightly         ALLERGIES     Patient has no known allergies. FAMILYHISTORY     History reviewed.  No pertinent family history. SOCIAL HISTORY       Social History     Tobacco Use    Smoking status: Never Smoker    Smokeless tobacco: Never Used   Substance Use Topics    Alcohol use: Yes     Alcohol/week: 2.0 standard drinks     Types: 2 Shots of liquor per week     Comment: pt states that he quit    Drug use: Never       SCREENINGS             PHYSICAL EXAM    (up to 7 for level 4, 8 or more for level 5)     ED Triage Vitals [02/12/20 1120]   BP Temp Temp Source Pulse Resp SpO2 Height Weight   135/74 97.6 °F (36.4 °C) Axillary 110 21 95 % 5' 9\" (1.753 m) 160 lb (72.6 kg)       Physical Exam  Vitals signs and nursing note reviewed. Constitutional:       Appearance: He is well-developed. He is not diaphoretic. HENT:      Head: Normocephalic and atraumatic. Right Ear: External ear normal.      Left Ear: External ear normal.      Nose: Nose normal.      Mouth/Throat:      Mouth: Mucous membranes are moist.      Comments: Bite erlin noted to the right tip of the tongue, no active bleeding at this time. Eyes:      General:         Right eye: No discharge. Left eye: No discharge. Extraocular Movements: Extraocular movements intact. Conjunctiva/sclera: Conjunctivae normal.      Pupils: Pupils are equal, round, and reactive to light. Neck:      Musculoskeletal: Normal range of motion and neck supple. Trachea: No tracheal deviation. Cardiovascular:      Rate and Rhythm: Normal rate and regular rhythm. Pulmonary:      Effort: Pulmonary effort is normal. No respiratory distress. Breath sounds: Normal breath sounds. No wheezing or rales. Abdominal:      General: Bowel sounds are normal. There is no distension. Palpations: Abdomen is soft. Tenderness: There is no abdominal tenderness. There is no guarding. Musculoskeletal: Normal range of motion. Comments: No midline cervical spinal tenderness   Skin:     General: Skin is warm and dry.    Neurological:      Mental Status: He is alert and oriented to person, place, and time. Cranial Nerves: Cranial nerves are intact. Motor: Motor function is intact.    Psychiatric:         Behavior: Behavior normal.         DIAGNOSTIC RESULTS   LABS:    Labs Reviewed   CBC WITH AUTO DIFFERENTIAL - Abnormal; Notable for the following components:       Result Value    RBC 4.16 (*)     Hemoglobin 13.3 (*)     Hematocrit 40.4 (*)     RDW 17.2 (*)     Platelets 055 (*)     Lymphocytes Absolute 0.8 (*)     All other components within normal limits    Narrative:     Performed at:  [facility] [address], [facility], [address]   Phone [phone]   COMPREHENSIVE METABOLIC PANEL - Abnormal; Notable for the following components:    Potassium 2.4 (*)     CO2 11 (*)     Anion Gap 30 (*)     Glucose 162 (*)     Calcium 7.6 (*)     Total Bilirubin 1.1 (*)     AST 58 (*)     All other components within normal limits    Narrative:     CALL  [facility] tel. [phone],  Chemistry results called to and read back by nallely francis, 02/12/2020  12:14, by EFRAIN  Performed at:  [facility] [address], [facility], [address]   Phone [phone]   PROTIME-INR - Abnormal; Notable for the following components:    Protime 15.3 (*)     INR 1.32 (*)     All other components within normal limits    Narrative:     Performed at:  [facility] [address], [facility], [address]   Phone [phone]   APTT - Abnormal; Notable for the following components:    aPTT 21.3 (*)     All other components within normal limits    Narrative:     Performed at:  [facility] [address], [facility], [address]   Phone 21  - Abnormal; Notable for the following components:    Pro-BNP 1,083 (*)     All other components within normal limits    Narrative:     Balaji Martin  [facility] tel. [phone],  Chemistry VENOUS - Abnormal; Notable for the following components:    pH, Arthur 7.320 (*)     pCO2, Arthur 19.9 (*)     pO2, Arthur 188.0 (*)     HCO3, Venous 10.3 (*)     Base Excess, Arthur -13.8 (*)     Carboxyhemoglobin 3.8 (*)     All other components within normal limits    Narrative:     Performed at:  [facility] [address], [facility], [address]   Phone [phone]   TROPONIN - Abnormal; Notable for the following components:    Troponin 0.02 (*)     All other components within normal limits    Narrative:     Performed at:  [facility] [address], [facility], [facility] [employer]   Phone [phone]   CK - Abnormal; Notable for the following components: Total  (*)     All other components within normal limits    Narrative:     Performed at:  [facility] [address], [facility], [facility] [employer]   Phone [phone]   LIPASE    Narrative:     [facility] tel. [phone],  Chemistry results called to and read back by nallely francis, 02/12/2020  12:14, by DECDEANN  Performed at:  [facility] [address], [facility], [address]   Phone [phone]   ETHANOL    Narrative:     [facility] tel. [phone],  Chemistry results called to and read back by nallely francis, 02/12/2020  12:14, by DECDEANN  Performed at:  [facility] [address], [facility], [facility] [employer]   Phone [phone]   URINE RT REFLEX TO CULTURE   URINE DRUG SCREEN   OSMOLALITY       All other labs were within normal range or not returned as of this dictation. EKG: All EKG's are interpreted by the Emergency Department Physician in the absence of a cardiologist.  Please see their note for interpretation of EKG.       RADIOLOGY:   Non-plain film images such as CT, Ultrasound and MRI are read by the radiologist. Plain radiographic images are visualized and preliminarily interpreted by the ED Provider with the below findings:        Interpretation per the Radiologist below, if available at the time of this note:    CT CERVICAL SPINE WO CONTRAST   Final Result   No acute abnormality of the cervical spine. CT HEAD WO CONTRAST   Final Result   1. No acute intracranial abnormality. XR CHEST PORTABLE   Preliminary Result   No acute process. Ct Head Wo Contrast    Result Date: 2/12/2020  EXAMINATION: CT OF THE HEAD WITHOUT CONTRAST  2/12/2020 12:01 pm TECHNIQUE: CT of the head was performed without the administration of intravenous contrast. Dose modulation, iterative reconstruction, and/or weight based adjustment of the mA/kV was utilized to reduce the radiation dose to as low as reasonably achievable. COMPARISON: None. HISTORY: ORDERING SYSTEM PROVIDED HISTORY: zyncope v seizure TECHNOLOGIST PROVIDED HISTORY: Reason for exam:->zyncope v seizure Has a \"code stroke\" or \"stroke alert\" been called? ->No Reason for Exam: AMS Acuity: Unknown Type of Exam: Unknown FINDINGS: BRAIN/VENTRICLES: Motion artifact degrades image quality. There is no acute intracerebral hemorrhage or extra-axial fluid collection. There is mild cerebral atrophy with mild periventricular white matter small vessel ischemic disease. Old lacunar infarct involves the left basal ganglia. ORBITS: Status post bilateral cataract removal. SINUSES: The visualized paranasal sinuses and mastoid air cells are clear. SOFT TISSUES/SKULL:  The calvarium is intact. 1. No acute intracranial abnormality. Ct Cervical Spine Wo Contrast    Result Date: 2/12/2020  EXAMINATION: CT OF THE CERVICAL SPINE WITHOUT CONTRAST 2/12/2020 12:00 pm TECHNIQUE: CT of the cervical spine was performed without the administration of intravenous contrast. Multiplanar reformatted images are provided for review.  Dose modulation, iterative reconstruction, and/or weight based adjustment of the mA/kV was utilized to reduce the DIFFERENTIAL DIAGNOSIS/MDM:   Vitals:    Vitals:    02/12/20 1300 02/12/20 1315 02/12/20 1330 02/12/20 1345   BP: (!) 159/86 (!) 153/87 (!) 140/81 138/84   Pulse: 99 99 101 95   Resp: 18 19 20 24   Temp:       TempSrc:       SpO2: (!) 88% (!) 89% (!) 87% 94%   Weight:       Height:           Patient was given the following medications:  Medications   lactulose (CHRONULAC) 10 GM/15ML solution 20 g (has no administration in time range)   0.9 % sodium chloride bolus (0 mLs Intravenous Stopped 2/12/20 1322)   Tetanus-Diphth-Acell Pertussis (BOOSTRIX) injection 0.5 mL (0.5 mLs Intramuscular Given 2/12/20 1223)   ondansetron (ZOFRAN) injection 4 mg (4 mg Intravenous Given 2/12/20 1155)   sodium chloride 0.9 % 7,415 mL with folic acid 1 mg, adult multi-vitamin with vitamin k 10 mL, thiamine 100 mg ( Intravenous New Bag 2/12/20 1323)   potassium chloride 10 mEq/100 mL IVPB (Peripheral Line) (10 mEq Intravenous New Bag 2/12/20 1309)   levetiracetam (KEPPRA) 1000 mg/100 mL IVPB (0 mg Intravenous Stopped 2/12/20 1308)   LORazepam (ATIVAN) injection 1 mg (1 mg Intravenous Given 2/12/20 1248)     The patient presents to the emergency department today for evaluation for a fall, and possible altered mental status. Per EMS, the found the patient lying in the grass, and apparently Narcan was given and the patient did seem to wake up. The patient is unsure exactly what happened. When patient arrived to the ED he does have blood noted to the outside of his mouth and that it appears that he has bitten his tongue. The patient states that he is actually asymptomatic at this time and he has no headaches. No visual changes. He has no numbness, tingling or weakness. Patient states that he does drink 2 cups of water a day and he cannot remember if he drink this morning. The patient states that he just cannot remember what happened.     Throughout his emergency room stay, the patient seems to become more alert, he states that he does dictations but occasionally words are mis-transcribed.)    Lanie Wilson PA-C (electronically signed)            Lanie Wilson PA-C  02/12/20 1413       Veronika Manzanares MD  96/18/76 6844

## 2020-02-12 NOTE — ED NOTES
Pt. Reports that he is on no medications at home and he is a poor historian.       Juan Jose Figueroa RN  02/12/20 JOHNNY Reynolds  02/12/20 3664

## 2020-02-12 NOTE — ED NOTES
Pt found down in parking lot (found in grass). Pt denies any pain anywhere. Pt with blood noted inside his mouth, nothing appears to be bleeding from outside. Pt is very poor historian, alert to self only. Pt thinks that it's 1968 and has no idea he is in a hospital. Pt denies drug/alcohol use. Pt very sensitive to sound. Per squad pt was given narcan and did become a bit more responsive. Pt doesn't remember what happened, unable to give a history. Reports that he doesn't take any home medications. No apparent injuries found. EKG completed and pt placed on monitors. Pt arrived with 2 squad lines.      Iris Recio RN  02/12/20 4295

## 2020-02-13 LAB
ANION GAP SERPL CALCULATED.3IONS-SCNC: 14 MMOL/L (ref 3–16)
BUN BLDV-MCNC: 10 MG/DL (ref 7–20)
CALCIUM SERPL-MCNC: 8 MG/DL (ref 8.3–10.6)
CHLORIDE BLD-SCNC: 104 MMOL/L (ref 99–110)
CO2: 22 MMOL/L (ref 21–32)
CREAT SERPL-MCNC: 0.8 MG/DL (ref 0.8–1.3)
GFR AFRICAN AMERICAN: >60
GFR NON-AFRICAN AMERICAN: >60
GLUCOSE BLD-MCNC: 88 MG/DL (ref 70–99)
HCT VFR BLD CALC: 35.6 % (ref 40.5–52.5)
HEMOGLOBIN: 11.9 G/DL (ref 13.5–17.5)
LACTIC ACID: 0.9 MMOL/L (ref 0.4–2)
MAGNESIUM: 1.6 MG/DL (ref 1.8–2.4)
MCH RBC QN AUTO: 32.4 PG (ref 26–34)
MCHC RBC AUTO-ENTMCNC: 33.6 G/DL (ref 31–36)
MCV RBC AUTO: 96.5 FL (ref 80–100)
PDW BLD-RTO: 17.9 % (ref 12.4–15.4)
PLATELET # BLD: 108 K/UL (ref 135–450)
PMV BLD AUTO: 9 FL (ref 5–10.5)
POTASSIUM REFLEX MAGNESIUM: 3.3 MMOL/L (ref 3.5–5.1)
RBC # BLD: 3.69 M/UL (ref 4.2–5.9)
SODIUM BLD-SCNC: 140 MMOL/L (ref 136–145)
WBC # BLD: 7.4 K/UL (ref 4–11)

## 2020-02-13 PROCEDURE — 83605 ASSAY OF LACTIC ACID: CPT

## 2020-02-13 PROCEDURE — 80048 BASIC METABOLIC PNL TOTAL CA: CPT

## 2020-02-13 PROCEDURE — 85027 COMPLETE CBC AUTOMATED: CPT

## 2020-02-13 PROCEDURE — 6360000002 HC RX W HCPCS: Performed by: INTERNAL MEDICINE

## 2020-02-13 PROCEDURE — 95819 EEG AWAKE AND ASLEEP: CPT

## 2020-02-13 PROCEDURE — 2580000003 HC RX 258: Performed by: INTERNAL MEDICINE

## 2020-02-13 PROCEDURE — 97530 THERAPEUTIC ACTIVITIES: CPT

## 2020-02-13 PROCEDURE — 2060000000 HC ICU INTERMEDIATE R&B

## 2020-02-13 PROCEDURE — 36415 COLL VENOUS BLD VENIPUNCTURE: CPT

## 2020-02-13 PROCEDURE — 6370000000 HC RX 637 (ALT 250 FOR IP): Performed by: INTERNAL MEDICINE

## 2020-02-13 PROCEDURE — 97116 GAIT TRAINING THERAPY: CPT

## 2020-02-13 PROCEDURE — 97165 OT EVAL LOW COMPLEX 30 MIN: CPT

## 2020-02-13 PROCEDURE — 95816 EEG AWAKE AND DROWSY: CPT | Performed by: PSYCHIATRY & NEUROLOGY

## 2020-02-13 PROCEDURE — 99223 1ST HOSP IP/OBS HIGH 75: CPT | Performed by: PSYCHIATRY & NEUROLOGY

## 2020-02-13 PROCEDURE — 97161 PT EVAL LOW COMPLEX 20 MIN: CPT

## 2020-02-13 PROCEDURE — 83735 ASSAY OF MAGNESIUM: CPT

## 2020-02-13 RX ORDER — MAGNESIUM SULFATE IN WATER 40 MG/ML
2 INJECTION, SOLUTION INTRAVENOUS ONCE
Status: COMPLETED | OUTPATIENT
Start: 2020-02-13 | End: 2020-02-13

## 2020-02-13 RX ORDER — POTASSIUM CHLORIDE 20 MEQ/1
40 TABLET, EXTENDED RELEASE ORAL ONCE
Status: COMPLETED | OUTPATIENT
Start: 2020-02-13 | End: 2020-02-13

## 2020-02-13 RX ADMIN — Medication 10 ML: at 09:43

## 2020-02-13 RX ADMIN — Medication 10 ML: at 21:39

## 2020-02-13 RX ADMIN — NIFEDIPINE 30 MG: 30 TABLET, EXTENDED RELEASE ORAL at 09:43

## 2020-02-13 RX ADMIN — ENOXAPARIN SODIUM 40 MG: 40 INJECTION SUBCUTANEOUS at 21:39

## 2020-02-13 RX ADMIN — SERTRALINE 50 MG: 50 TABLET, FILM COATED ORAL at 09:43

## 2020-02-13 RX ADMIN — POTASSIUM CHLORIDE 40 MEQ: 1500 TABLET, EXTENDED RELEASE ORAL at 10:29

## 2020-02-13 RX ADMIN — PANTOPRAZOLE SODIUM 40 MG: 40 TABLET, DELAYED RELEASE ORAL at 05:39

## 2020-02-13 RX ADMIN — SODIUM CHLORIDE: 9 INJECTION, SOLUTION INTRAVENOUS at 05:39

## 2020-02-13 RX ADMIN — METOPROLOL SUCCINATE 50 MG: 50 TABLET, EXTENDED RELEASE ORAL at 09:43

## 2020-02-13 RX ADMIN — MAGNESIUM SULFATE HEPTAHYDRATE 2 G: 40 INJECTION, SOLUTION INTRAVENOUS at 09:47

## 2020-02-13 ASSESSMENT — ENCOUNTER SYMPTOMS
NAUSEA: 0
SHORTNESS OF BREATH: 0
VOMITING: 0
COUGH: 0
DIARRHEA: 0
ABDOMINAL PAIN: 0
RHINORRHEA: 0

## 2020-02-13 ASSESSMENT — PAIN SCALES - GENERAL
PAINLEVEL_OUTOF10: 0

## 2020-02-13 NOTE — PLAN OF CARE
Problem: Falls - Risk of:  Goal: Will remain free from falls  Description  Will remain free from falls  2/13/2020 0406 by Anali Gay RN  Outcome: Ongoing  2/12/2020 1749 by Lori Shepherd RN  Outcome: Ongoing  Goal: Absence of physical injury  Description  Absence of physical injury  2/13/2020 0406 by Anali Gay RN  Outcome: Ongoing  2/12/2020 1749 by Lori Shepherd RN  Outcome: Ongoing     Problem: Risk for Impaired Skin Integrity  Goal: Tissue integrity - skin and mucous membranes  Description  Structural intactness and normal physiological function of skin and  mucous membranes.   2/13/2020 0406 by Anali Gay RN  Outcome: Ongoing  2/12/2020 1749 by Lori Shepherd RN  Outcome: Ongoing

## 2020-02-13 NOTE — PROGRESS NOTES
40 meq po potassium given for K level 3.3 - up to bathroom for void and back to chair- call light in reach chair alarm on

## 2020-02-13 NOTE — FLOWSHEET NOTE
02/13/20 1232   Encounter Summary   Services provided to: Patient  (ex-wife)   Referral/Consult From: Nurse   Support System   (ex-wife)   Place of [facility]   (visit, kenn, JUSTO doc discussion 2/13 CL)   Complexity of Encounter Moderate   Length of Encounter 45 minutes   Advance Care Planning Yes   Spiritual/Restoration   Type Spiritual support   Assessment Calm; Approachable; Hopeful;Coping   Intervention Active listening;Explored feelings, thoughts, concerns; Discussed relationship with God;Discussed belief system/Bahai practices/pieter;Discussed illness/injury and it's impact   Outcome Engaged in conversation;Coping; Hopeful   Advance Directives (For Healthcare)   Healthcare Directive No, patient does not have an advance directive for healthcare treatment   Advance Directives Documents given;Documents explained   Electronically signed by Ashley Bennett on 2/13/2020 at 12:36 PM

## 2020-02-13 NOTE — PROGRESS NOTES
Shift assessment complete, meds given whole with water. Pt has been given lactulose and therefore is unable to obtain a c-diff sample. Pt is on camera and has had no seizure activity. Pt has been forgetful though about exactly what happened to him. Is easily reoriented. Vitals have been stable. No other concerns, will continue to monitor.

## 2020-02-13 NOTE — CONSULTS
In patient Neurology consult        [facility] Neurology      MD Enedelia Melendez [place]  1950    Date of Service: 2/13/2020    Referring Physician: Jose Daniel Zavaleta MD      Reason for the consult and CC: Acute encephalopathy and possible new onset seizure. History was obtained from chart reviewing and discussion with the patient. The patient has no recollection of the whole event. HPI:   The patient is a 71y.o.  years old male with history of A. fib, depression and prior cardiac arrest who was admitted to the hospital with acute encephalopathy and possible unwitnessed seizure. Symptoms started few hours prior to admission yesterday. He was found unresponsive in his apartment complex on the ground. Unclear duration. Degree was severe. No other associated symptom except for bruise tongue biting and blood coming out from his mouth. The patient was rushed to the ER where he was evaluated and eventually admitted. Initial work-up with CT of the head showed no acute findings. He was loaded with Keppra with 1 g and admitted to the hospital.  Today he is awake and alert. He denies any headache or chest pain or dysphagia or dysarthria. Complaining of pain in his tongue. No prior history of seizure or family history of epilepsy. No history of head trauma, LOC or fever or chills. He has history of alcohol use but he has not had a drink for quite some time according to the patient. Recent UDS was negative. No clear triggers or other associated symptoms per Degree was severe. No relieving or aggravating factors. Other review of system was unremarkable. Family history: No family history of epilepsy    Surgical history: Noncontributory.     Past Medical History:   Diagnosis Date    Alcoholism [facility])     Cardiac arrest ([facility] [address] 08/2016    Depression     PAF (paroxysmal atrial fibrillation) ([facility])      Social History     Tobacco Use    Smoking status: Never Smoker    Smokeless Vitals:    02/13/20 0900 02/13/20 0917 02/13/20 0930 02/13/20 1200   BP: (!) 187/84  (!) 171/105 132/88   Pulse: 84   84   Resp: 18   18   Temp: 99 °F (37.2 °C)   98.6 °F (37 °C)   TempSrc: Temporal   Temporal   SpO2:  90%  92%   Weight:       Height:           General appearance:  Normal development and appear in no acute distress. Eye: No icterus. Fundus: No blurring of optic disc. Neck: supple. Cardiovascular: No carotid bruit. No lower leg edema with good pulsation. Mental Status:   Oriented to person, place, problem, and time. Memory: Aware of recent and remote event. Good immediate recall. Intact remote memory  Normal attention span and concentration. Language: intact naming, repeating and fluency   Good fund of Knowledge. Aware of current events and vocabulary   Cranial Nerves:   II: Visual fields: Full to confrontation and nl [facility]. Pupils: equal, round, reactive to light  III,IV,VI: Extra Ocular Movements are intact. No nystagmus  V: Facial sensation is intact to pin prick and light touch  VII: Facial strength and movements: intact and symmetric  VIII: Hearing: Intact to finger rub bilaterally  IX: Palate elevation is symmetric  XI: Shoulder shrug is intact  XII: Tongue movements are normal. Bruised tongue with ecchymosis. Mild edema. Musculoskeletal: 5/5 in all 4 extremities. Tone: Normal tone. Reflexes: Bilateral biceps 2/4, triceps 2/4, brachial radialis 2/4, knee 2/4 and ankle 2/4. Planters: flexor bilaterally. Coordination: no pronator drift, no dysmetria with FNF in upper extremities. Normal REM. Sensation: normal to all modalities in both arms and legs. Gait/Posture: unsteady gait and normal posturing and station.      Data:  LABS:   Lab Results   Component Value Date     02/13/2020    K 3.3 02/13/2020     02/13/2020    CO2 22 02/13/2020    BUN 10 02/13/2020    CREATININE 0.8 02/13/2020    GFRAA >60 02/13/2020    LABGLOM >60 02/13/2020    GLUCOSE 88 02/13/2020    MG 1.60 02/13/2020    CALCIUM 8.0 02/13/2020     Lab Results   Component Value Date    WBC 7.4 02/13/2020    RBC 3.69 02/13/2020    HGB 11.9 02/13/2020    HCT 35.6 02/13/2020    MCV 96.5 02/13/2020    RDW 17.9 02/13/2020     02/13/2020     Lab Results   Component Value Date    INR 1.32 (H) 02/12/2020    PROTIME 15.3 (H) 02/12/2020       Neuroimaging were independently reviewed by myself and discussed results with the patient   Reviewed notes from different physicians  Reviewed lab and blood testing    Impression:  Acute encephalopathy, severe. New onset seizure  Hypertension  History of alcohol abuse  Depression  Hypokalemia    Recommendation:  MRI brain with contrast  EEG  PT and OT  Continue current blood pressure medications  Blood pressure monitor  DVT and GI prophylaxis  Telemetry  No driving until he is cleared from his physician  Long discussion with the patient regarding seizure precaution, risk of falling and injury and risk of recurrence  Will have further discussion with the patient tomorrow regarding continue with Keppra versus no AED unless symptoms recur. Continue SSRI  Replace potassium and follow CBC and CMP. Speech evaluation  We will follow. Thank you for referring such patient. If you have any questions regarding my consult note, please don't hesitate to call me. Conrado Zapata MD  [phone]    This dictation was generated by voice recognition computer software.  Although all attempts are made to edit the dictation for accuracy, there may be errors in the  transcription that are not intended

## 2020-02-13 NOTE — PROGRESS NOTES
[id number] Therapy   Occupational Therapy Initial Assessment  Date: 2020   Patient Name: Destini Nieves  MRN: [id number]     : 1950    Date of Service: 2020    Discharge Recommendations:    Destini Nieves scored a 19/24 on the AM-PAC ADL Inpatient form. Current research shows that an AM-PAC score of 18 or greater is typically associated with a discharge to the patient's home setting. Based on the patients AM-PAC score and their current ADL deficits, it is recommended that the patient have 2-3 sessions per week of Occupational Therapy at d/c to increase the patients independence. HOME HEALTH CARE: LEVEL 3 SAFETY     - Initial home health evaluation to occur within 24-48 hours, in patient home   - Therapy evaluations in home within 24-48 hours of discharge; including DME and home safety   - Frontload therapy 5 days, then 3x a week   - Therapy to evaluate if patient has [address] needs for personal care   -  evaluation within 24-48 hours, includes evaluation of resources and insurance to determine AL, [place], LTC, and Medicaid options      OT Equipment Recommendations  Equipment Needed: No  Other: Continue to assess for next level of care    Assessment   Performance deficits / Impairments: Decreased functional mobility ; Decreased strength;Decreased endurance;Decreased cognition;Decreased safe awareness  Assessment: Pt not at baseline due to above deficits. Pt would continue to benefit from skilled OT services in order to return to [facility]. Treatment Diagnosis: Seizure  Prognosis: Good  Decision Making: Low Complexity  History: PAF, depression, alcoholism  Assistance / Modification: SBA for sit/stand transfers, CGA for functional mobility with RW  OT Education: OT Role;Plan of Care  Patient Education: Pt verbalized understanding of OT role, POC, eval, d/c. Continue to assess for future sessions.    Barriers to Learning: cognition  REQUIRES OT FOLLOW UP: Yes  Activity 1044)    Goals  Short term goals  Time Frame for Short term goals: d/c  Short term goal 1: Mod I for functional mobility to complete ADL/IADL tasks  Short term goal 2: Mod I for functional ADL transfers  Short term goal 3: Mod I for toileting  Short term goal 4: Mod I for LB ADL's  Short term goal 5: Mod I for UB ADL's  Long term goals  Time Frame for Long term goals : STG = LTG  Patient Goals   Patient goals : [facility] Therapy Time   Individual Concurrent Group Co-treatment   Time In 0919         Time Out 1005         Minutes 46             Timed Code Treatment Minutes:  31 Minutes    Total Treatment Minutes:  55 minutes    BREANN Mcmahon    Occupational Therapist present and directed patient care, made skilled clinical decisions and was responsible for assessment and treatment this date.   James Philippe, OTR/L [id number]        James Philippe, OT

## 2020-02-13 NOTE — H&P
is elevated in the tens and he is noted to be postictal and slowly clearing up. He tells me that the last time he was in the hospital he was in a medically induced coma. He speaks [place] and [place] and he says that when he woke up from his medical coma he was only speaking gentleman. Past Medical History:        Diagnosis Date    Alcoholism [facility])     Cardiac arrest ([facility] [address] 08/2016    Depression     PAF (paroxysmal atrial fibrillation) (formerly [facility])        Past Surgical History:    History reviewed. No pertinent surgical history. Medications Prior to Admission:    Prior to Admission medications    Medication Sig Start Date End Date Taking? Authorizing Provider   metoprolol succinate (TOPROL XL) 50 MG extended release tablet Take 50 mg by mouth daily    Historical Provider, MD   NIFEdipine (PROCARDIA XL) 30 MG extended release tablet Take 30 mg by mouth daily    Historical Provider, MD   pantoprazole (PROTONIX) 40 MG tablet Take 40 mg by mouth daily    Historical Provider, MD   sertraline (ZOLOFT) 50 MG tablet Take 50 mg by mouth daily    Historical Provider, MD   traZODone (DESYREL) 50 MG tablet Take 50 mg by mouth nightly    Historical Provider, MD   doxycycline hyclate (VIBRAMYCIN) 100 MG capsule Take 100 mg by mouth 2 times daily    Historical Provider, MD   metroNIDAZOLE (METROCREAM) 0.75 % cream Apply topically 2 times daily Apply topically 2 times daily. Historical Provider, MD       Allergies:  Patient has no known allergies. Social History:  The patient currently lives AT HOME     TOBACCO:   reports that he has never smoked. He has never used smokeless tobacco.  ETOH:   reports current alcohol use of about 2.0 standard drinks of alcohol per week. Family History:  Reviewed in detail and negative for DM, Early CAD, Cancer, CVA. Positive as follows:    History reviewed. No pertinent family history.     REVIEW OF SYSTEMS:   Positive for remember ing thevents from the morning then not recalling anything. Complains of pain on both sides of his tongue. and as noted in the HPI. All other systems reviewed and negative. PHYSICAL EXAM:    BP (!) 165/92   Pulse 91   Temp 98.1 °F (36.7 °C) (Oral)   Resp 20   Ht 5' 9\" (1.753 m)   Wt 160 lb (72.6 kg)   SpO2 95%   BMI 23.63 kg/m²     General appearance: No apparent distress appears stated age and cooperative. HEENT Normal cephalic, atraumatic without obvious deformity. Pupils equal, round, and reactive to light. Extra ocular muscles intact. Conjunctivae/corneas clear. Tongue appears to have been bitten   Neck: Supple, No jugular venous distention/bruits. Trachea midline without thyromegaly or adenopathy with full range of motion. Lungs: Clear to auscultation, bilaterally without Rales/Wheezes/Rhonchi with good respiratory effort. Heart: Regular rate and rhythm with Normal S1/S2 without murmurs, rubs or gallops, point of maximum impulse non-displaced  Abdomen: Soft, non-tender or non-distended without rigidity or guarding and positive bowel sounds all four quadrants. Extremities: No clubbing, cyanosis, or edema bilaterally. Full range of motion without deformity and normal gait intact. Skin: Skin color, texture, turgor normal.  No rashes or lesions. Neurologic: Alert and oriented X 3, neurovascularly intact with sensory/motor intact upper extremities/lower extremities, bilaterally. Cranial nerves: II-XII intact, grossly non-focal.  Mental status: Alert, oriented, he seems a bit off. Capillary Refill: Acceptable  < 3 seconds  Peripheral Pulses: +3 Easily felt, not easily obliterated with pressure      Ct  CT of head neck and cervical spine are without abnormalities. EKG:  I have reviewed the EKG with the following interpretation: Sinus rhythm with frequent premature ventricular complexes possible left atrial enlargement prolonged QT.     CBC   Recent Labs     02/12/20  1141   WBC 6.0   HGB 13.3*   HCT 40.4*   *      RENAL  Recent Labs 02/12/20  1141      K 2.4*      CO2 11*   BUN 7   CREATININE 0.8     LFT'S  Recent Labs     02/12/20  1141   AST 58*   ALT 27   BILITOT 1.1*   ALKPHOS 82     COAG  Recent Labs     02/12/20  1140   INR 1.32*     CARDIAC ENZYMES  Recent Labs     02/12/20  1140   CKTOTAL 367*   TROPONINI 0.02*       U/A:    Lab Results   Component Value Date    COLORU DK YELLOW 02/12/2020    WBCUA 3 02/12/2020    RBCUA 17 02/12/2020    CLARITYU CLOUDY 02/12/2020    SPECGRAV 1.019 02/12/2020    LEUKOCYTESUR Negative 02/12/2020    BLOODU MODERATE 02/12/2020    GLUCOSEU Negative 02/12/2020       ABG  No results found for: QGO9WMT, BEART, R2XKCONX, PHART, THGBART, SMG3WPE, PO2ART, OGP3GXS        Active Hospital Problems    Diagnosis Date Noted    Seizure [facility]) [R56.9] 02/12/2020         PHYSICIANS CERTIFICATION:    I certify that Iesha Pugh is expected to be hospitalized for greater  than 2 midnights based on the following assessment and plan:      ASSESSMENT/PLAN:    Found down-  Appears to have been the result of a seizure. Tongue biting noted  -Lactic acid elevated  -Patient was loaded with Keppra in the emergency department. He will be on seizure precautions. -repeat lactic acid  -Consult to neurology as suspected seizure. Hypokalemia   -Patient received 10 mill equivalents IV in the emergency department  -We will place on potassium replacement protocol  -We will monitor patient metabolic profile. Lactic acidosis  -Appears to be secondary to seizure  -Repeat lactic acid is pending. Previous cardiac arrest  -Per care everywhere notes was felt to be related to hypomagnesemia.  -Again we will monitor electrolyte profile while in house  Resume patient home medications-including beta-blocker    Alcoholism  -Patient reports that he will have 2-3 drinks  -Every other to every 2 to 3 days  -He reports that he has gone more than 3 days without drinking and has not had any issues. .    DVT Prophylaxis: Lovenox  Diet: DIET CARDIAC;  Code Status: Full Code  PT/OT Eval Status: Eval and treat    Dispo - admit to the inpatient service anticipate hospitalization of 2 days       Shayla Barreto MD    Thank you No primary care provider on file. for the opportunity to be involved in this patient's care. If you have any questions or concerns please feel free to contact me at [phone].

## 2020-02-13 NOTE — PLAN OF CARE
Awake alert and oriented x 4- impulsive- acts quickly without thinking- pulled iv line- bleeding at iv site- cleansed and flushed and still patent- lungs clear but diminished bilateral - no leg edema - denies pain - red face - chronic- call light in reach -now with am meds- iv mag replacement now infusing for mag level 1.6- K level 3.3 - po potassium requested for replacement- call light in reach -up with PT/OT - back to chair

## 2020-02-13 NOTE — PROGRESS NOTES
Physical Therapy    Facility/Department: [address]  Initial Assessment    NAME: Kuldip Moreno  : 1950  MRN: [id number]    Date of Service: 2020    Discharge Recommendations: Kuldip Moreno scored a 19/24 on the AM-PAC short mobility form. Current research shows that an AM-PAC score of 18 or greater is typically associated with a discharge to the patient's home setting. Based on the patients AM-PAC score and their current functional mobility deficits, it is recommended that the patient have 2-3 sessions per week of Physical Therapy at d/c to increase the patients independence. HOME HEALTH CARE: LEVEL 1 STANDARD    - Initial home health evaluation to occur within 24-48 hours, in patient home   - Therapy to evaluate with goal of regaining prior level of functioning   - Therapy to evaluate if patient has [address] needs for personal care      PT Equipment Recommendations  Equipment Needed: No    Assessment   Body structures, Functions, Activity limitations: Decreased functional mobility ; Decreased endurance;Decreased strength  Assessment: Pt requires SBA for transfers and CGA for amb. Pt experienced decreased oxygen saturation to 85% during amb but was unable to recognize s/s during or following amb. Pt will benefit from additional PT to increase functional mobility and return to baseline function. Treatment Diagnosis: decreased funcitonal mobility  Prognosis: Good  Decision Making: Low Complexity  PT Education: Goals;PT Role;Plan of Care;Gait Training;Precautions  Patient Education: d/c recommendations  Barriers to Learning: none  REQUIRES PT FOLLOW UP: Yes  Activity Tolerance  Activity Tolerance: Patient Tolerated treatment well;Patient limited by endurance       Patient Diagnosis(es): The primary encounter diagnosis was Seizure ([facility] [address]  Diagnoses of Altered mental status, unspecified altered mental status type, Metabolic acidosis, Encephalopathy, and Hypokalemia were also pertinent to this visit. has a past medical history of Alcoholism ([facility] [address] Cardiac arrest ([facility] [address] Depression, and PAF (paroxysmal atrial fibrillation) ([facility] [address] has no past surgical history on file. Restrictions  Restrictions/Precautions  Restrictions/Precautions: Fall Risk, Contact Precautions, Seizure(high fall risk, C-diff precuations)  Required Braces or Orthoses?: No  Position Activity Restriction  Other position/activity restrictions: Neha Davison is a 71 y.o. male who presents to the emergency department today for evaluation for a fall, and possible altered mental status. Per EMS, the found the patient lying in the grass, and apparently Narcan was given and the patient did seem to wake up. The patient is unsure exactly what happened. When patient arrived to the ED he does have blood noted to the outside of his mouth and that it appears that he has bitten his tongue. The patient states that he is actually asymptomatic at this time and he has no headaches. No visual changes. He has no numbness, tingling or weakness. Patient states that he does drink 2 cups of water a day and he cannot remember if he drink this morning. The patient states that he just cannot remember what happened. Vision/Hearing  Vision: Impaired  Vision Exceptions: Wears glasses for reading  Hearing: Within functional limits       Subjective  General  Chart Reviewed: Yes  Patient assessed for rehabilitation services?: Yes  Response To Previous Treatment: Not applicable  Family / Caregiver Present: No  Diagnosis: Seizure  Follows Commands: Within Functional Limits  Other (Comment): 66YO male found down in parking lot (found in grass). Pt denies any pain anywhere. Pt with blood noted inside his mouth, nothing appears to be bleeding from outside. Pt is very poor historian, alert to self only. Pt thinks that it's 1968 and has no idea he is in a hospital. Pt denies drug/alcohol use. Pt very sensitive to sound.   General Comment  Comments: Pt agreeable to PT/OT evaluation  Subjective  Subjective: Pt seated in chair on arrival  Pain Screening  Patient Currently in Pain: Denies  Vital Signs  Patient Currently in Pain: Denies     Orientation  Orientation  Overall Orientation Status: Within Functional Limits     Social/Functional History  Social/Functional History  Lives With: Alone  Type of Home: Apartment(independent living)  Home Layout: One level  Home Access: Level entry  Bathroom Shower/Tub: Walk-in shower(bench seat)  Bathroom Toilet: Standard  Bathroom Equipment: Grab bars in shower, Grab bars around toilet  Bathroom Accessibility: Accessible  Home Equipment: [employer] Help From: Other (comment)(aids for cleaning)  ADL Assistance: [address]: Independent  Homemaking Responsibilities: No  Ambulation Assistance: Independent  Transfer Assistance: Independent  Active : No  Patient's  Info: uses transport provided by facility or [facility]  Additional Comments: Pt states no other falls in the last 6 months. Objective  AROM RLE (degrees)  RLE AROM: WFL  AROM LLE (degrees)  LLE AROM : WFL  Strength RLE  Strength RLE: WFL  Strength LLE  Strength LLE: WFL  Tone RLE  RLE Tone: Normotonic  Tone LLE  LLE Tone: Normotonic  Motor Control  Gross Motor?: WNL     Bed mobility  Comment: unable to assess, pt in chair on arrival and wished to return to chair  Transfers  Sit to Stand: Stand by assistance(to RW)  Stand to sit: Supervision  Ambulation  Ambulation?: Yes  Ambulation 1  Surface: level tile  Device: Rolling Walker  Assistance: Contact guard assistance  Quality of Gait: Increased hany with increased RHONDA, pt demonstrates R path deviation causing him to run into the corner of the wall on one occasion. No LOB  Distance: 300'  Comments: Pt denied any SOB, dizziness or lightheaded. PT noted decreased hany, and flushed facial appearance.  SpO2 on return to room 85% which returned to 95% without

## 2020-02-14 ENCOUNTER — APPOINTMENT (OUTPATIENT)
Dept: MRI IMAGING | Age: 70
DRG: 101 | End: 2020-02-14
Payer: MEDICARE

## 2020-02-14 VITALS
WEIGHT: 168.4 LBS | SYSTOLIC BLOOD PRESSURE: 166 MMHG | RESPIRATION RATE: 16 BRPM | HEIGHT: 69 IN | OXYGEN SATURATION: 94 % | TEMPERATURE: 98.8 F | BODY MASS INDEX: 24.94 KG/M2 | DIASTOLIC BLOOD PRESSURE: 89 MMHG | HEART RATE: 79 BPM

## 2020-02-14 LAB
ANION GAP SERPL CALCULATED.3IONS-SCNC: 13 MMOL/L (ref 3–16)
BUN BLDV-MCNC: 8 MG/DL (ref 7–20)
CALCIUM SERPL-MCNC: 8.4 MG/DL (ref 8.3–10.6)
CHLORIDE BLD-SCNC: 98 MMOL/L (ref 99–110)
CO2: 22 MMOL/L (ref 21–32)
CREAT SERPL-MCNC: 0.7 MG/DL (ref 0.8–1.3)
GFR AFRICAN AMERICAN: >60
GFR NON-AFRICAN AMERICAN: >60
GLUCOSE BLD-MCNC: 99 MG/DL (ref 70–99)
MAGNESIUM: 1.7 MG/DL (ref 1.8–2.4)
POTASSIUM SERPL-SCNC: 3.4 MMOL/L (ref 3.5–5.1)
SODIUM BLD-SCNC: 133 MMOL/L (ref 136–145)

## 2020-02-14 PROCEDURE — 6370000000 HC RX 637 (ALT 250 FOR IP): Performed by: INTERNAL MEDICINE

## 2020-02-14 PROCEDURE — 83735 ASSAY OF MAGNESIUM: CPT

## 2020-02-14 PROCEDURE — 70553 MRI BRAIN STEM W/O & W/DYE: CPT

## 2020-02-14 PROCEDURE — 80048 BASIC METABOLIC PNL TOTAL CA: CPT

## 2020-02-14 PROCEDURE — 6360000004 HC RX CONTRAST MEDICATION: Performed by: PSYCHIATRY & NEUROLOGY

## 2020-02-14 PROCEDURE — 2580000003 HC RX 258: Performed by: INTERNAL MEDICINE

## 2020-02-14 PROCEDURE — 6370000000 HC RX 637 (ALT 250 FOR IP): Performed by: PSYCHIATRY & NEUROLOGY

## 2020-02-14 PROCEDURE — 2580000003 HC RX 258: Performed by: PSYCHIATRY & NEUROLOGY

## 2020-02-14 PROCEDURE — 94760 N-INVAS EAR/PLS OXIMETRY 1: CPT

## 2020-02-14 PROCEDURE — 97116 GAIT TRAINING THERAPY: CPT

## 2020-02-14 PROCEDURE — A9577 INJ MULTIHANCE: HCPCS | Performed by: PSYCHIATRY & NEUROLOGY

## 2020-02-14 PROCEDURE — 99233 SBSQ HOSP IP/OBS HIGH 50: CPT | Performed by: PSYCHIATRY & NEUROLOGY

## 2020-02-14 PROCEDURE — 36415 COLL VENOUS BLD VENIPUNCTURE: CPT

## 2020-02-14 RX ORDER — SODIUM CHLORIDE 9 MG/ML
INJECTION, SOLUTION INTRAVENOUS ONCE
Status: COMPLETED | OUTPATIENT
Start: 2020-02-14 | End: 2020-02-14

## 2020-02-14 RX ORDER — LEVETIRACETAM 500 MG/1
500 TABLET ORAL 2 TIMES DAILY
Status: DISCONTINUED | OUTPATIENT
Start: 2020-02-14 | End: 2020-02-14 | Stop reason: HOSPADM

## 2020-02-14 RX ORDER — LEVETIRACETAM 500 MG/1
500 TABLET ORAL 2 TIMES DAILY
Qty: 60 TABLET | Refills: 3 | Status: ON HOLD | OUTPATIENT
Start: 2020-02-14 | End: 2020-03-17 | Stop reason: SDUPTHER

## 2020-02-14 RX ADMIN — PANTOPRAZOLE SODIUM 40 MG: 40 TABLET, DELAYED RELEASE ORAL at 06:22

## 2020-02-14 RX ADMIN — SODIUM CHLORIDE: 9 INJECTION, SOLUTION INTRAVENOUS at 16:01

## 2020-02-14 RX ADMIN — SODIUM CHLORIDE: 9 INJECTION, SOLUTION INTRAVENOUS at 00:36

## 2020-02-14 RX ADMIN — GADOBENATE DIMEGLUMINE 15 ML: 529 INJECTION, SOLUTION INTRAVENOUS at 16:01

## 2020-02-14 RX ADMIN — NIFEDIPINE 30 MG: 30 TABLET, EXTENDED RELEASE ORAL at 09:22

## 2020-02-14 RX ADMIN — LEVETIRACETAM 500 MG: 500 TABLET ORAL at 11:54

## 2020-02-14 RX ADMIN — METOPROLOL SUCCINATE 50 MG: 50 TABLET, EXTENDED RELEASE ORAL at 09:22

## 2020-02-14 RX ADMIN — SERTRALINE 50 MG: 50 TABLET, FILM COATED ORAL at 09:22

## 2020-02-14 NOTE — DISCHARGE SUMMARY
Hospital Medicine Discharge Summary    Patient: Aye Null     Gender: male  : 1950   Age: 71 y.o. MRN: [id number]    Admitting Physician: Sha Medina MD  Discharge Physician: Sha Medina MD     Code Status: Full Code     Admit Date: 2020   Discharge Date:   2020    Disposition:  Home    Discharge Diagnoses: Active Hospital Problems    Diagnosis Date Noted    Acute encephalopathy [G93.40]     Hypokalemia [E87.6]     New onset seizure ([address] [R56.9] 2020    HTN (hypertension), benign [I10] 2019       Follow-up appointments:  two weeks    Outpatient to do list: follow up    Condition at Discharge:  Odell Stacy Course:   Alexa Askew a 71 y. o. male who presents to the emergency department today for evaluation for a fall, and possible altered mental status.  Per EMS, the found the patient lying in the grass, and apparently Narcan was given and the patient did seem to wake up.  The patient is unsure exactly what happened.  When patient arrived to the ED he does have blood noted to the outside of his mouth and that it appears that he has bitten his tongue.  The patient states that he is actually asymptomatic at this time and he has no headaches.  No visual changes.  He has no numbness, tingling or weakness.  Patient states that he does drink 2 cups of water a day and he cannot remember if he drink this morning.  The patient states that he just cannot remember what happened.     Throughout his emergency room stay, the patient seems to become more alert, he states that he does remember waking up this morning, and he states that he does remember watching TV, and he states that he remembers walking to the bank to get money out but he realized that he did not have his card and he states that he cannot remember anything after that. Sangeeta Lr denies any known history of seizures, he denies any recent illnesses including fever, cough, vomiting or Current Discharge Medication List      CONTINUE these medications which have NOT CHANGED    Details   metoprolol succinate (TOPROL XL) 50 MG extended release tablet Take 50 mg by mouth daily      NIFEdipine (PROCARDIA XL) 30 MG extended release tablet Take 30 mg by mouth daily      pantoprazole (PROTONIX) 40 MG tablet Take 40 mg by mouth daily      sertraline (ZOLOFT) 50 MG tablet Take 50 mg by mouth daily      traZODone (DESYREL) 50 MG tablet Take 50 mg by mouth nightly      metroNIDAZOLE (METROCREAM) 0.75 % cream Apply topically 2 times daily Apply topically 2 times daily. Current Discharge Medication List      STOP taking these medications       doxycycline hyclate (VIBRAMYCIN) 100 MG capsule Comments:   Reason for Stopping:               Discharge ROS:  A complete review of systems was asked and negative     Discharge Exam:    BP (!) 166/89   Pulse 79   Temp 98.8 °F (37.1 °C) (Temporal)   Resp 16   Ht 5' 9\" (1.753 m)   Wt 168 lb 6.4 oz (76.4 kg)   SpO2 94%   BMI 24.87 kg/m²      General appearance: No apparent distress, appears stated age and cooperative. HEENT: Pupils equal, round, and reactive to light. Conjunctivae/corneas clear. Neck: Supple, with full range of motion. No jugular venous distention. Trachea midline. Respiratory:  Normal respiratory effort. Clear to auscultation, bilaterally without Rales/Wheezes/Rhonchi. Cardiovascular: Regular rate and rhythm with normal S1/S2 without murmurs, rubs or gallops. Abdomen: Soft, non-tender, non-distended with normal bowel sounds. Musculoskeletal: No clubbing, cyanosis or edema bilaterally. Full range of motion without deformity. Skin: Skin color, texture, turgor normal.  No rashes or lesions. Neurologic:  Neurovascularly intact without any focal sensory/motor deficits.  Cranial nerves: II-XII intact, grossly non-focal.  Psychiatric: Alert and oriented, thought content appropriate, normal insight  Capillary Refill: Brisk,< 3 seconds 2/12/2020  EXAMINATION: CT OF THE CERVICAL SPINE WITHOUT CONTRAST 2/12/2020 12:00 pm TECHNIQUE: CT of the cervical spine was performed without the administration of intravenous contrast. Multiplanar reformatted images are provided for review. Dose modulation, iterative reconstruction, and/or weight based adjustment of the mA/kV was utilized to reduce the radiation dose to as low as reasonably achievable. COMPARISON: None. HISTORY: ORDERING SYSTEM PROVIDED HISTORY: falkl TECHNOLOGIST PROVIDED HISTORY: Reason for exam:->falkl Reason for Exam: fall Acuity: Unknown Type of Exam: Unknown FINDINGS: BONES/ALIGNMENT: No evidence of fracture or traumatic subluxation DEGENERATIVE CHANGES: Degenerative disc disease C3-C4 through C5-C6 and C7-T1. Bony fusion at C6-C7. Diffuse facet osteoarthritis. Slight degenerative anterolisthesis of C4 and several mm anterolisthesis of C7 due to facet degeneration. SOFT TISSUES: No prevertebral soft tissue swelling     No acute abnormality of the cervical spine. Xr Chest Portable    Result Date: 2/12/2020  EXAMINATION: ONE XRAY VIEW OF THE CHEST 2/12/2020 11:40 am COMPARISON: December 4, 2019 HISTORY: ORDERING SYSTEM PROVIDED HISTORY: SOB TECHNOLOGIST PROVIDED HISTORY: Reason for exam:->SOB Reason for Exam: Fall (brought in by [facility]. found in the parking lot in the grass. pt intially minimally responsive. given narcan. more alert upon arrival, still very confused. . blood noted to mouth Acuity: Unknown Type of Exam: Initial FINDINGS: Pacemaker device is stable. No consolidation, effusion or pneumothorax. Stable cardiomediastinal silhouette. No acute process. Mri Brain W Wo Contrast    Result Date: 2/14/2020  EXAMINATION: MRI OF THE BRAIN WITHOUT AND WITH CONTRAST  2/14/2020 11:21 am TECHNIQUE: Multiplanar multisequence MRI of the head/brain was performed without and with the administration of intravenous contrast. COMPARISON: None.  HISTORY: ORDERING SYSTEM PROVIDED HISTORY: yoel TECHNOLOGIST PROVIDED HISTORY: Reason for exam:->yoel Reason for Exam: Poss seizure  Syncope  Inj. 15 cc multihance  gfr over 60 today Acuity: Acute Type of Exam: Unknown FINDINGS: INTRACRANIAL STRUCTURES/VENTRICLES:  No acute infarction. No mass effect or midline shift. No acute intracranial hemorrhage. Diffuse parenchymal volume loss with enlargement of the ventricles and cerebral sulci. Periventricular and subcortical white matter T2/FLAIR hyperintense signal.  The sellar/suprasellar regions appear unremarkable. The normal signal voids within the major intracranial vessels appear maintained. No abnormal focus of enhancement is seen within the brain. Coronal T2 and FLAIR sequences demonstrate symmetric signal intensity and volume in the hippocampi bilaterally. ORBITS: The visualized portion of the orbits demonstrate no acute abnormality. SINUSES: The visualized paranasal sinuses and mastoid air cells are well aerated. BONES/SOFT TISSUES: The bone marrow signal intensity appears normal. The soft tissues demonstrate no acute abnormality. 1. No acute intracranial abnormality. Specifically, no acute infarction or mesial temporal sclerosis. 2. Parenchymal volume loss and mild-to-moderate chronic microvascular ischemic changes. EKG     Sinus rhythm with frequent Premature ventricular complexesPossible Left atrial enlargementProlonged QTAbnormal ECG    The patient was seen and examined on day of discharge and this discharge summary is in conjunction with any daily progress note from day of discharge. Time Spent on discharge is 35 minutes  in the examination, evaluation, counseling and review of medications and discharge plan. Note that greater than 30 minutes was spent in preparing discharge papers, discussing discharge with patient, medication review, etc.       Signed:    Gautam Mendoza MD   2/14/2020      Thank you No primary care provider on file.  for the opportunity to be involved in

## 2020-02-14 NOTE — PROGRESS NOTES
Planters: flexor bilaterally. Coordination: no pronator drift, no dysmetria with FNF. Normal REM. Sensation: normal to all modalities in both arms and legs. Gait/Posture: steady gait        Data:  LABS:   Lab Results   Component Value Date     02/14/2020    K 3.4 02/14/2020    K 3.3 02/13/2020    CL 98 02/14/2020    CO2 22 02/14/2020    BUN 8 02/14/2020    CREATININE 0.7 02/14/2020    GFRAA >60 02/14/2020    LABGLOM >60 02/14/2020    GLUCOSE 99 02/14/2020    MG 1.70 02/14/2020    CALCIUM 8.4 02/14/2020     Lab Results   Component Value Date    WBC 7.4 02/13/2020    RBC 3.69 02/13/2020    HGB 11.9 02/13/2020    HCT 35.6 02/13/2020    MCV 96.5 02/13/2020    RDW 17.9 02/13/2020     02/13/2020     Lab Results   Component Value Date    INR 1.32 (H) 02/12/2020    PROTIME 15.3 (H) 02/12/2020       Neuroimaging was independently reviewed by me and discussed results with the patient. I reviewed blood testing and other test results and discussed results with the patient. Independently reviewed EEG. EEG showed no epileptiform discharges. Impression:  Acute encephalopathy, severe. New onset seizure  Hypertension  History of alcohol abuse  Depression  Hypokalemia      Recommendation    Awaiting MRI brain  Long discussion with the patient regarding pros and cons of starting AED including risk of recurrence. Since he lives by himself and he is at high risk of another seizure, I will start Keppra 500 mg twice daily. Discussed seizure precaution as well as side effect of Keppra in details. He does not drive. He was advised not to drive until he is cleared from his physician  PT and OT  Continue current blood pressure medications  Continue SSRI  Telemetry  DVT and GI prophylaxis  Can be discharged from neurology when medically stable  Follow-up with me in 1 month for further recommendation. Gideon Villafana MD   [phone]      This dictation was generated by voice recognition computer software. Although all attempts are made to edit the dictation for accuracy, there may be errors in the transcription that are not intended.

## 2020-02-14 NOTE — PROGRESS NOTES
Malinda Manuel is a 71 y.o. male who presents to the emergency department today for evaluation for a fall, and possible altered mental status. Per EMS, the found the patient lying in the grass, and apparently Narcan was given and the patient did seem to wake up. The patient is unsure exactly what happened. When patient arrived to the ED he does have blood noted to the outside of his mouth and that it appears that he has bitten his tongue. The patient states that he is actually asymptomatic at this time and he has no headaches. No visual changes. He has no numbness, tingling or weakness. Patient states that he does drink 2 cups of water a day and he cannot remember if he drink this morning. The patient states that he just cannot remember what happened. Subjective   General  Chart Reviewed: Yes  Response To Previous Treatment: Patient with no complaints from previous session. Family / Caregiver Present: No  Subjective  Subjective: Pt reporting no pain at this time. Agreeable to PT session. General Comment  Comments: Pt seated in chair with alarm on upon arrival.   Pain Screening  Patient Currently in Pain: Denies  Vital Signs  Patient Currently in Pain: Denies       Orientation  Orientation  Overall Orientation Status: Within Functional Limits     Objective   Bed mobility  Supine to Sit: Independent  Sit to Supine: Independent  Transfers  Sit to Stand: Independent  Stand to sit: Independent  Ambulation  Ambulation?: Yes  Ambulation 1  Surface: level tile  Device: No Device  Assistance: Independent  Quality of Gait: increased hany, slight sway noted  Distance: >900'  Comments: Pt able to negotiate obstacles without assist. Pt turning to look over B shoulders throughout ambulation without LOB.    Stairs/Curb  Stairs?: No     Balance  Posture: Good  Sitting - Static: Good  Sitting - Dynamic: Good  Standing - Static: Good  Standing - Dynamic: Good            G-Code     OutComes Score AM-PAC Score  AM-PAC Inpatient Mobility Raw Score : 24 (02/14/20 1340)  AM-PAC Inpatient T-Scale Score : 61.14 (02/14/20 1340)  Mobility Inpatient CMS 0-100% Score: 0 (02/14/20 1340)  Mobility Inpatient CMS G-Code Modifier : CH (02/14/20 1340)          Goals  Short term goals  Time Frame for Short term goals: by d/c  Short term goal 1: Pt will complete all transfers with Mod I--MET 2/14/2020  Short term goal 2: Pt will amb with LRAD 150' with supervision--MET 2/14/2020  Short term goal 3: Pt will complete car transfer with SBA  Short term goal 4: Pt will complete all bed mobility with Mod I--MET 2/14/2020  Patient Goals   Patient goals : none stated  3 GOALS MET THIS DATE    Plan    Plan  Times per week: discharge  Times per day: Daily  Current Treatment Recommendations: Strengthening, Gait Training, Balance Training, Home Exercise Program, Endurance Training, Functional Mobility Training, Patient/Caregiver Education & Training  Safety Devices  Type of devices:  All fall risk precautions in place, Left in chair, Telesitter in use, Call light within reach, Nurse notified, Chair alarm in place, Gait belt, Patient at risk for falls  Restraints  Initially in place: No     Therapy Time   Individual Concurrent Group Co-treatment   Time In 1319         Time Out 1331         Minutes 12              Timed Code Treatment Minutes:  12  Total Treatment Minutes:  [address], [address], 316 [facility]

## 2020-02-14 NOTE — PROGRESS NOTES
Hospitalist Progress Note      PCP: No primary care provider on file. Date of Admission: 2/12/2020    Chief Complaint:  Found down     Hospital Course:   Alesha Saha a 71 y. o. male who presents to the emergency department today for evaluation for a fall, and possible altered mental status.  Per EMS, the found the patient lying in the grass, and apparently Narcan was given and the patient did seem to wake up.  The patient is unsure exactly what happened.  When patient arrived to the ED he does have blood noted to the outside of his mouth and that it appears that he has bitten his tongue.  The patient states that he is actually asymptomatic at this time and he has no headaches.  No visual changes.  He has no numbness, tingling or weakness.  Patient states that he does drink 2 cups of water a day and he cannot remember if he drink this morning.  The patient states that he just cannot remember what happened.     Throughout his emergency room stay, the patient seems to become more alert, he states that he does remember waking up this morning, and he states that he does remember watching TV, and he states that he remembers walking to the bank to get money out but he realized that he did not have his card and he states that he cannot remember anything after that. Abel De La Torre denies any known history of seizures, he denies any recent illnesses including fever, cough, vomiting or diarrhea.  He has no chest pain or shortness of breath at this time. Angel Fernandez otherwise has no complaints. He has notable history for acardiac arrest in the past in the emergency department his work-up is consistent with having had a seizure his lactic acid is elevated in the tens and he is noted to be postictal and slowly clearing up. He tells me that the last time he was in the hospital he was in a medically induced coma.   He speaks [place] and [place] and he says that when he woke up from his medical coma he was only speaking 02/12/20  1141 02/13/20  0447   WBC 6.0 7.4   HGB 13.3* 11.9*   HCT 40.4* 35.6*   * 108*     Recent Labs     02/12/20  1141 02/13/20  0446    140   K 2.4* 3.3*    104   CO2 11* 22   BUN 7 10   CREATININE 0.8 0.8   CALCIUM 7.6* 8.0*     Recent Labs     02/12/20  1141   AST 58*   ALT 27   BILITOT 1.1*   ALKPHOS 82     Recent Labs     02/12/20  1140   INR 1.32*     Recent Labs     02/12/20  1140   CKTOTAL 367*   TROPONINI 0.02*       Urinalysis:      Lab Results   Component Value Date    NITRU Negative 02/12/2020    WBCUA 3 02/12/2020    RBCUA 17 02/12/2020    BLOODU MODERATE 02/12/2020    SPECGRAV 1.019 02/12/2020    GLUCOSEU Negative 02/12/2020       Radiology:  CT CERVICAL SPINE WO CONTRAST   Final Result   No acute abnormality of the cervical spine. CT HEAD WO CONTRAST   Final Result   1. No acute intracranial abnormality. XR CHEST PORTABLE   Final Result   No acute process. MRI BRAIN W WO CONTRAST    (Results Pending)           Assessment/Plan:    Active Hospital Problems    Diagnosis    Acute encephalopathy [G93.40]    Hypokalemia [E87.6]    New onset seizure ([address] [R56.9]    HTN (hypertension), benign [I10]     Found down-  Appears to have been the result of a seizure. Tongue biting noted  -Lactic acid elevated  -Patient was loaded with Keppra in the emergency department. He will be on seizure precautions. -repeat lactic acid  -Consult to neurology as suspected seizure. Repeat lactic acid was 1.5 suggesting that his was seizure.     Hypokalemia   -Patient received 10 mill equivalents IV in the emergency department  -We will place on potassium replacement protocol  -We will monitor patient metabolic profile.   -replace K today    Hypomagnesemia  -replaced.     Lactic acidosis  -Appears to be secondary to seizure  -Repeat lactic acid is pending.     Previous cardiac arrest  -Per care everywhere notes was felt to be related to hypomagnesemia.  -Again we will monitor electrolyte profile while in house  Resume patient home medications-including beta-blocker     Alcoholism  -Patient reports that he will have 2-3 drinks  -Every other to every 2 to 3 days  -He reports that he has gone more than 3 days without drinking and has not had any issues. .       DVT Prophylaxis: lovenox  Diet: DIET CARDIAC;  Code Status: Full Code    PT/OT Eval Status: eval and treat     Dispo - await completion of work up. Hopefully can go back to [place] tomorrow.      Carlos Nguyen MD

## 2020-02-14 NOTE — PROGRESS NOTES
CLINICAL PHARMACY NOTE: MEDS TO [address] Select Patient?: No  Total # of Prescriptions Filled: 1   The following medications were delivered to the patient:  · Keppra 500mg  Total # of Interventions Completed: 0  Time Spent (min): 15    Additional Documentation:  Delivered to Patient Martha Griffin CPhT

## 2020-02-14 NOTE — CARE COORDINATION
Discharge Planning Assessment  RN/SW discharge planner met with patient/ (and family member) to discuss reason for admission, current living situation, and potential needs at the time of discharge    Demographics/Insurance verified Yes - [place] Administration/Medicare    Current type of dwelling: apartment ([employer])    Patient from ECF/SW confirmed with: n/a    Living arrangements: alone     Level of function/Support: independent    PCP: Mandy Escobar ([place])    Last Visit to PCP: NOV 2019    DME: none     Active with any community resources/agencies/skilled home care: [facility]    Medication compliance issues: none    Financial issues that could impact healthcare: none    Tentative discharge plan: back to independent living     Discussed and provided facilities of choice if transition to a skilled nursing facility is required at the time of discharge n/a    Discussed with patient and/or family that on the day of discharge home tentative time of discharge will be between 10 AM and noon.  n/a    Transportation at the time of discharge: spouse (depending on time)    Gwenette Brunner, BSN, RN   [phone]

## 2020-02-14 NOTE — PLAN OF CARE
Problem: Falls - Risk of:  Goal: Will remain free from falls  Description  Will remain free from falls  Outcome: Ongoing  Goal: Absence of physical injury  Description  Absence of physical injury  Outcome: Ongoing     Problem: Risk for Impaired Skin Integrity  Goal: Tissue integrity - skin and mucous membranes  Description  Structural intactness and normal physiological function of skin and  mucous membranes.   Outcome: Ongoing     Problem: Safety:  Goal: Ability to remain free from injury will improve  Description  Ability to remain free from injury will improve  Outcome: Ongoing

## 2020-02-17 NOTE — PROGRESS NOTES
Occupational Therapy Discharge Summary    Name: Neha Davison  : 1950    The pt was evaluated by OT on  and seen for 0 treatment sessions prior to DC to home on  per MD order. The pt's acute therapy goals were:  Short term goals  Time Frame for Short term goals: d/c  Short term goal 1: Mod I for functional mobility to complete ADL/IADL tasks  Short term goal 2: Mod I for functional ADL transfers  Short term goal 3: Mod I for toileting  Short term goal 4: Mod I for LB ADL's  Short term goal 5: Mod I for UB ADL's  Long term goals  Time Frame for Long term goals : STG = LTG     Patient met 0 goals during stay. Number of Refusals:0  Number of Holds: 0  During this hospitalization, the patient was educated on:  OT Education: OT Role, Plan of Care  Patient Education: Pt verbalized independent understanding of OT role, POC, eval, d/c. Continue to assess for future sessions. DC pt from OT caseload at this time. Thank you! Shi Ramos, [address]

## 2020-03-11 ENCOUNTER — APPOINTMENT (OUTPATIENT)
Dept: GENERAL RADIOLOGY | Age: 70
End: 2020-03-11
Payer: MEDICARE

## 2020-03-11 ENCOUNTER — APPOINTMENT (OUTPATIENT)
Dept: CT IMAGING | Age: 70
End: 2020-03-11
Payer: MEDICARE

## 2020-03-11 ENCOUNTER — APPOINTMENT (OUTPATIENT)
Dept: GENERAL RADIOLOGY | Age: 70
DRG: 957 | End: 2020-03-11
Attending: INTERNAL MEDICINE
Payer: MEDICARE

## 2020-03-11 ENCOUNTER — HOSPITAL ENCOUNTER (EMERGENCY)
Age: 70
Discharge: ANOTHER HOSPITAL | End: 2020-03-11
Attending: EMERGENCY MEDICINE
Payer: MEDICARE

## 2020-03-11 ENCOUNTER — APPOINTMENT (OUTPATIENT)
Dept: CT IMAGING | Age: 70
DRG: 957 | End: 2020-03-11
Attending: INTERNAL MEDICINE
Payer: MEDICARE

## 2020-03-11 ENCOUNTER — HOSPITAL ENCOUNTER (INPATIENT)
Age: 70
LOS: 7 days | Discharge: HOME HEALTH | DRG: 957 | End: 2020-03-18
Attending: INTERNAL MEDICINE | Admitting: INTERNAL MEDICINE
Payer: MEDICARE

## 2020-03-11 VITALS
SYSTOLIC BLOOD PRESSURE: 154 MMHG | WEIGHT: 178.5 LBS | HEIGHT: 69 IN | RESPIRATION RATE: 20 BRPM | HEART RATE: 77 BPM | OXYGEN SATURATION: 100 % | DIASTOLIC BLOOD PRESSURE: 93 MMHG | TEMPERATURE: 98.1 F | BODY MASS INDEX: 26.44 KG/M2

## 2020-03-11 PROBLEM — J96.01 ACUTE RESPIRATORY FAILURE WITH HYPOXIA AND HYPERCAPNIA (HCC): Status: ACTIVE | Noted: 2020-03-11

## 2020-03-11 PROBLEM — J96.02 ACUTE RESPIRATORY FAILURE WITH HYPOXIA AND HYPERCAPNIA (HCC): Status: ACTIVE | Noted: 2020-03-11

## 2020-03-11 PROBLEM — I62.9 INTRACRANIAL HEMORRHAGE (HCC): Status: ACTIVE | Noted: 2020-03-11

## 2020-03-11 PROBLEM — I10 ESSENTIAL HYPERTENSION: Status: ACTIVE | Noted: 2020-03-11

## 2020-03-11 LAB
A/G RATIO: 1.3 (ref 1.1–2.2)
ABO/RH: NORMAL
ALBUMIN SERPL-MCNC: 4.1 G/DL (ref 3.4–5)
ALP BLD-CCNC: 64 U/L (ref 40–129)
ALT SERPL-CCNC: 27 U/L (ref 10–40)
AMPHETAMINE SCREEN, URINE: ABNORMAL
ANION GAP SERPL CALCULATED.3IONS-SCNC: 29 MMOL/L (ref 3–16)
ANTIBODY SCREEN: NORMAL
AST SERPL-CCNC: 63 U/L (ref 15–37)
BARBITURATE SCREEN URINE: ABNORMAL
BASE EXCESS ARTERIAL: 7 (ref -3–3)
BASOPHILS ABSOLUTE: 0 K/UL (ref 0–0.2)
BASOPHILS RELATIVE PERCENT: 0.1 %
BENZODIAZEPINE SCREEN, URINE: POSITIVE
BILIRUB SERPL-MCNC: 1.6 MG/DL (ref 0–1)
BILIRUBIN URINE: ABNORMAL
BLOOD BANK DISPENSE STATUS: NORMAL
BLOOD BANK PRODUCT CODE: NORMAL
BLOOD, URINE: ABNORMAL
BPU ID: NORMAL
BUN BLDV-MCNC: 17 MG/DL (ref 7–20)
CALCIUM SERPL-MCNC: 8.6 MG/DL (ref 8.3–10.6)
CANNABINOID SCREEN URINE: ABNORMAL
CHLORIDE BLD-SCNC: 92 MMOL/L (ref 99–110)
CLARITY: CLEAR
CO2: 18 MMOL/L (ref 21–32)
COCAINE METABOLITE SCREEN URINE: ABNORMAL
COLOR: ABNORMAL
COMMENT UA: ABNORMAL
CREAT SERPL-MCNC: 1.4 MG/DL (ref 0.8–1.3)
DESCRIPTION BLOOD BANK: NORMAL
EKG ATRIAL RATE: 97 BPM
EKG DIAGNOSIS: NORMAL
EKG P AXIS: 71 DEGREES
EKG P-R INTERVAL: 126 MS
EKG Q-T INTERVAL: 392 MS
EKG QRS DURATION: 82 MS
EKG QTC CALCULATION (BAZETT): 497 MS
EKG R AXIS: 0 DEGREES
EKG T AXIS: 10 DEGREES
EKG VENTRICULAR RATE: 97 BPM
EOSINOPHILS ABSOLUTE: 0 K/UL (ref 0–0.6)
EOSINOPHILS RELATIVE PERCENT: 0 %
EPITHELIAL CELLS, UA: 3 /HPF (ref 0–5)
GFR AFRICAN AMERICAN: >60
GFR NON-AFRICAN AMERICAN: 50
GLOBULIN: 3.2 G/DL
GLUCOSE BLD-MCNC: 109 MG/DL (ref 70–99)
GLUCOSE BLD-MCNC: 120 MG/DL (ref 70–99)
GLUCOSE URINE: NEGATIVE MG/DL
HCO3 ARTERIAL: 30.2 MMOL/L (ref 21–29)
HCT VFR BLD CALC: 38.1 % (ref 40.5–52.5)
HEMOGLOBIN: 12.6 G/DL (ref 13.5–17.5)
HYALINE CASTS: 44 /LPF (ref 0–8)
INR BLD: 1.23 (ref 0.86–1.14)
KETONES, URINE: 40 MG/DL
LEUKOCYTE ESTERASE, URINE: ABNORMAL
LYMPHOCYTES ABSOLUTE: 0.2 K/UL (ref 1–5.1)
LYMPHOCYTES RELATIVE PERCENT: 2.2 %
Lab: ABNORMAL
MAGNESIUM: 1.3 MG/DL (ref 1.8–2.4)
MAGNESIUM: 1.4 MG/DL (ref 1.8–2.4)
MCH RBC QN AUTO: 31.9 PG (ref 26–34)
MCHC RBC AUTO-ENTMCNC: 32.9 G/DL (ref 31–36)
MCV RBC AUTO: 96.7 FL (ref 80–100)
METHADONE SCREEN, URINE: ABNORMAL
MICROSCOPIC EXAMINATION: YES
MONOCYTES ABSOLUTE: 0.6 K/UL (ref 0–1.3)
MONOCYTES RELATIVE PERCENT: 7.3 %
NEUTROPHILS ABSOLUTE: 7.4 K/UL (ref 1.7–7.7)
NEUTROPHILS RELATIVE PERCENT: 90.4 %
NITRITE, URINE: POSITIVE
O2 SAT, ARTERIAL: 98 % (ref 93–100)
OPIATE SCREEN URINE: ABNORMAL
OXYCODONE URINE: ABNORMAL
PCO2 ARTERIAL: 41.3 MM HG (ref 35–45)
PDW BLD-RTO: 17.9 % (ref 12.4–15.4)
PERFORMED ON: ABNORMAL
PH ARTERIAL: 7.47 (ref 7.35–7.45)
PH UA: 5
PH UA: 5.5 (ref 5–8)
PHENCYCLIDINE SCREEN URINE: ABNORMAL
PLATELET # BLD: 75 K/UL (ref 135–450)
PLATELET SLIDE REVIEW: ABNORMAL
PMV BLD AUTO: 9.8 FL (ref 5–10.5)
PO2 ARTERIAL: 103.2 MM HG (ref 75–108)
POC SAMPLE TYPE: ABNORMAL
POTASSIUM REFLEX MAGNESIUM: 3.8 MMOL/L (ref 3.5–5.1)
PROPOXYPHENE SCREEN: ABNORMAL
PROTEIN UA: >=300 MG/DL
PROTHROMBIN TIME: 14.3 SEC (ref 10–13.2)
RBC # BLD: 3.94 M/UL (ref 4.2–5.9)
RBC UA: 19 /HPF (ref 0–4)
SLIDE REVIEW: ABNORMAL
SODIUM BLD-SCNC: 139 MMOL/L (ref 136–145)
SPECIFIC GRAVITY UA: >1.03 (ref 1–1.03)
TCO2 ARTERIAL: 32 MMOL/L
TOTAL CK: 296 U/L (ref 39–308)
TOTAL PROTEIN: 7.3 G/DL (ref 6.4–8.2)
TROPONIN: 0.06 NG/ML
URINE REFLEX TO CULTURE: YES
URINE TYPE: ABNORMAL
UROBILINOGEN, URINE: 1 E.U./DL
WBC # BLD: 8.1 K/UL (ref 4–11)
WBC UA: 11 /HPF (ref 0–5)

## 2020-03-11 PROCEDURE — 80053 COMPREHEN METABOLIC PANEL: CPT

## 2020-03-11 PROCEDURE — 36430 TRANSFUSION BLD/BLD COMPNT: CPT

## 2020-03-11 PROCEDURE — 72125 CT NECK SPINE W/O DYE: CPT

## 2020-03-11 PROCEDURE — 83735 ASSAY OF MAGNESIUM: CPT

## 2020-03-11 PROCEDURE — 86901 BLOOD TYPING SEROLOGIC RH(D): CPT

## 2020-03-11 PROCEDURE — 6360000002 HC RX W HCPCS: Performed by: EMERGENCY MEDICINE

## 2020-03-11 PROCEDURE — 93005 ELECTROCARDIOGRAM TRACING: CPT | Performed by: EMERGENCY MEDICINE

## 2020-03-11 PROCEDURE — 95813 EEG EXTND MNTR 61-119 MIN: CPT

## 2020-03-11 PROCEDURE — 84484 ASSAY OF TROPONIN QUANT: CPT

## 2020-03-11 PROCEDURE — 36600 WITHDRAWAL OF ARTERIAL BLOOD: CPT

## 2020-03-11 PROCEDURE — 0BH17EZ INSERTION OF ENDOTRACHEAL AIRWAY INTO TRACHEA, VIA NATURAL OR ARTIFICIAL OPENING: ICD-10-PCS | Performed by: STUDENT IN AN ORGANIZED HEALTH CARE EDUCATION/TRAINING PROGRAM

## 2020-03-11 PROCEDURE — 85025 COMPLETE CBC W/AUTO DIFF WBC: CPT

## 2020-03-11 PROCEDURE — 36415 COLL VENOUS BLD VENIPUNCTURE: CPT

## 2020-03-11 PROCEDURE — 99291 CRITICAL CARE FIRST HOUR: CPT | Performed by: INTERNAL MEDICINE

## 2020-03-11 PROCEDURE — 72170 X-RAY EXAM OF PELVIS: CPT

## 2020-03-11 PROCEDURE — 73590 X-RAY EXAM OF LOWER LEG: CPT

## 2020-03-11 PROCEDURE — 71045 X-RAY EXAM CHEST 1 VIEW: CPT

## 2020-03-11 PROCEDURE — 73090 X-RAY EXAM OF FOREARM: CPT

## 2020-03-11 PROCEDURE — 85610 PROTHROMBIN TIME: CPT

## 2020-03-11 PROCEDURE — 87086 URINE CULTURE/COLONY COUNT: CPT

## 2020-03-11 PROCEDURE — 81001 URINALYSIS AUTO W/SCOPE: CPT

## 2020-03-11 PROCEDURE — 73552 X-RAY EXAM OF FEMUR 2/>: CPT

## 2020-03-11 PROCEDURE — 99291 CRITICAL CARE FIRST HOUR: CPT

## 2020-03-11 PROCEDURE — 93010 ELECTROCARDIOGRAM REPORT: CPT | Performed by: INTERNAL MEDICINE

## 2020-03-11 PROCEDURE — 94002 VENT MGMT INPAT INIT DAY: CPT

## 2020-03-11 PROCEDURE — 82947 ASSAY GLUCOSE BLOOD QUANT: CPT

## 2020-03-11 PROCEDURE — 96365 THER/PROPH/DIAG IV INF INIT: CPT

## 2020-03-11 PROCEDURE — 2500000003 HC RX 250 WO HCPCS: Performed by: EMERGENCY MEDICINE

## 2020-03-11 PROCEDURE — 82550 ASSAY OF CK (CPK): CPT

## 2020-03-11 PROCEDURE — 6360000002 HC RX W HCPCS: Performed by: STUDENT IN AN ORGANIZED HEALTH CARE EDUCATION/TRAINING PROGRAM

## 2020-03-11 PROCEDURE — 94750 HC PULMONARY COMPLIANCE STUDY: CPT

## 2020-03-11 PROCEDURE — 70450 CT HEAD/BRAIN W/O DYE: CPT

## 2020-03-11 PROCEDURE — 96375 TX/PRO/DX INJ NEW DRUG ADDON: CPT

## 2020-03-11 PROCEDURE — 2700000000 HC OXYGEN THERAPY PER DAY

## 2020-03-11 PROCEDURE — 6360000002 HC RX W HCPCS: Performed by: SURGERY

## 2020-03-11 PROCEDURE — 2580000003 HC RX 258: Performed by: NURSE PRACTITIONER

## 2020-03-11 PROCEDURE — 2000000000 HC ICU R&B

## 2020-03-11 PROCEDURE — P9035 PLATELET PHERES LEUKOREDUCED: HCPCS

## 2020-03-11 PROCEDURE — 2580000003 HC RX 258: Performed by: EMERGENCY MEDICINE

## 2020-03-11 PROCEDURE — 73630 X-RAY EXAM OF FOOT: CPT

## 2020-03-11 PROCEDURE — 94761 N-INVAS EAR/PLS OXIMETRY MLT: CPT

## 2020-03-11 PROCEDURE — 94770 HC ETCO2 MONITOR DAILY: CPT

## 2020-03-11 PROCEDURE — 80307 DRUG TEST PRSMV CHEM ANLYZR: CPT

## 2020-03-11 PROCEDURE — 70496 CT ANGIOGRAPHY HEAD: CPT

## 2020-03-11 PROCEDURE — 96368 THER/DIAG CONCURRENT INF: CPT

## 2020-03-11 PROCEDURE — 5A1945Z RESPIRATORY VENTILATION, 24-96 CONSECUTIVE HOURS: ICD-10-PCS | Performed by: STUDENT IN AN ORGANIZED HEALTH CARE EDUCATION/TRAINING PROGRAM

## 2020-03-11 PROCEDURE — 82803 BLOOD GASES ANY COMBINATION: CPT

## 2020-03-11 PROCEDURE — 86900 BLOOD TYPING SEROLOGIC ABO: CPT

## 2020-03-11 PROCEDURE — 6360000002 HC RX W HCPCS: Performed by: NURSE PRACTITIONER

## 2020-03-11 PROCEDURE — 6360000004 HC RX CONTRAST MEDICATION: Performed by: EMERGENCY MEDICINE

## 2020-03-11 PROCEDURE — 2500000003 HC RX 250 WO HCPCS: Performed by: SURGERY

## 2020-03-11 PROCEDURE — 2580000003 HC RX 258: Performed by: SURGERY

## 2020-03-11 PROCEDURE — 86850 RBC ANTIBODY SCREEN: CPT

## 2020-03-11 RX ORDER — ETOMIDATE 2 MG/ML
20 INJECTION INTRAVENOUS ONCE
Status: COMPLETED | OUTPATIENT
Start: 2020-03-11 | End: 2020-03-11

## 2020-03-11 RX ORDER — ETOMIDATE 2 MG/ML
INJECTION INTRAVENOUS
Status: DISCONTINUED
Start: 2020-03-11 | End: 2020-03-11 | Stop reason: HOSPADM

## 2020-03-11 RX ORDER — 0.9 % SODIUM CHLORIDE 0.9 %
1000 INTRAVENOUS SOLUTION INTRAVENOUS ONCE
Status: COMPLETED | OUTPATIENT
Start: 2020-03-11 | End: 2020-03-11

## 2020-03-11 RX ORDER — SODIUM CHLORIDE 9 MG/ML
INJECTION, SOLUTION INTRAVENOUS CONTINUOUS
Status: DISCONTINUED | OUTPATIENT
Start: 2020-03-11 | End: 2020-03-11

## 2020-03-11 RX ORDER — ONDANSETRON 2 MG/ML
4 INJECTION INTRAMUSCULAR; INTRAVENOUS EVERY 6 HOURS PRN
Status: DISCONTINUED | OUTPATIENT
Start: 2020-03-11 | End: 2020-03-18 | Stop reason: HOSPADM

## 2020-03-11 RX ORDER — ONDANSETRON 2 MG/ML
4 INJECTION INTRAMUSCULAR; INTRAVENOUS ONCE
Status: COMPLETED | OUTPATIENT
Start: 2020-03-11 | End: 2020-03-11

## 2020-03-11 RX ORDER — SUCCINYLCHOLINE/SOD CL,ISO/PF 200MG/10ML
SYRINGE (ML) INTRAVENOUS
Status: DISCONTINUED
Start: 2020-03-11 | End: 2020-03-11 | Stop reason: HOSPADM

## 2020-03-11 RX ORDER — SODIUM CHLORIDE 9 MG/ML
INJECTION, SOLUTION INTRAVENOUS
Status: DISPENSED
Start: 2020-03-11 | End: 2020-03-12

## 2020-03-11 RX ORDER — ROCURONIUM BROMIDE 10 MG/ML
100 INJECTION, SOLUTION INTRAVENOUS ONCE
Status: COMPLETED | OUTPATIENT
Start: 2020-03-11 | End: 2020-03-11

## 2020-03-11 RX ORDER — PROMETHAZINE HYDROCHLORIDE 25 MG/1
12.5 TABLET ORAL EVERY 6 HOURS PRN
Status: DISCONTINUED | OUTPATIENT
Start: 2020-03-11 | End: 2020-03-18 | Stop reason: HOSPADM

## 2020-03-11 RX ORDER — LORAZEPAM 2 MG/ML
2 INJECTION INTRAMUSCULAR ONCE
Status: COMPLETED | OUTPATIENT
Start: 2020-03-11 | End: 2020-03-11

## 2020-03-11 RX ORDER — SODIUM CHLORIDE 0.9 % (FLUSH) 0.9 %
10 SYRINGE (ML) INJECTION EVERY 12 HOURS SCHEDULED
Status: DISCONTINUED | OUTPATIENT
Start: 2020-03-11 | End: 2020-03-18 | Stop reason: HOSPADM

## 2020-03-11 RX ORDER — MAGNESIUM SULFATE IN WATER 40 MG/ML
4 INJECTION, SOLUTION INTRAVENOUS ONCE
Status: COMPLETED | OUTPATIENT
Start: 2020-03-11 | End: 2020-03-11

## 2020-03-11 RX ORDER — SODIUM CHLORIDE 0.9 % (FLUSH) 0.9 %
10 SYRINGE (ML) INJECTION PRN
Status: DISCONTINUED | OUTPATIENT
Start: 2020-03-11 | End: 2020-03-18 | Stop reason: HOSPADM

## 2020-03-11 RX ORDER — LEVETIRACETAM 10 MG/ML
1000 INJECTION INTRAVASCULAR ONCE
Status: COMPLETED | OUTPATIENT
Start: 2020-03-11 | End: 2020-03-11

## 2020-03-11 RX ORDER — ATORVASTATIN CALCIUM 80 MG/1
80 TABLET, FILM COATED ORAL NIGHTLY
Status: DISCONTINUED | OUTPATIENT
Start: 2020-03-11 | End: 2020-03-11

## 2020-03-11 RX ORDER — SODIUM CHLORIDE 0.9 % (FLUSH) 0.9 %
10 SYRINGE (ML) INJECTION PRN
Status: DISCONTINUED | OUTPATIENT
Start: 2020-03-11 | End: 2020-03-18 | Stop reason: SDUPTHER

## 2020-03-11 RX ORDER — SODIUM CHLORIDE 0.9 % (FLUSH) 0.9 %
10 SYRINGE (ML) INJECTION EVERY 12 HOURS SCHEDULED
Status: DISCONTINUED | OUTPATIENT
Start: 2020-03-11 | End: 2020-03-18 | Stop reason: SDUPTHER

## 2020-03-11 RX ORDER — PROPOFOL 10 MG/ML
10 INJECTION, EMULSION INTRAVENOUS
Status: DISCONTINUED | OUTPATIENT
Start: 2020-03-11 | End: 2020-03-15

## 2020-03-11 RX ORDER — LABETALOL HYDROCHLORIDE 5 MG/ML
20 INJECTION, SOLUTION INTRAVENOUS ONCE
Status: COMPLETED | OUTPATIENT
Start: 2020-03-11 | End: 2020-03-11

## 2020-03-11 RX ORDER — 0.9 % SODIUM CHLORIDE 0.9 %
20 INTRAVENOUS SOLUTION INTRAVENOUS ONCE
Status: COMPLETED | OUTPATIENT
Start: 2020-03-11 | End: 2020-03-11

## 2020-03-11 RX ORDER — PROPOFOL 10 MG/ML
10 INJECTION, EMULSION INTRAVENOUS
Status: DISCONTINUED | OUTPATIENT
Start: 2020-03-11 | End: 2020-03-11 | Stop reason: HOSPADM

## 2020-03-11 RX ADMIN — TRANEXAMIC ACID 1000 MG: 1 INJECTION, SOLUTION INTRAVENOUS at 12:49

## 2020-03-11 RX ADMIN — ONDANSETRON 4 MG: 2 INJECTION INTRAMUSCULAR; INTRAVENOUS at 11:35

## 2020-03-11 RX ADMIN — IOPAMIDOL 75 ML: 755 INJECTION, SOLUTION INTRAVENOUS at 11:46

## 2020-03-11 RX ADMIN — PROPOFOL 30 MCG/KG/MIN: 10 INJECTION, EMULSION INTRAVENOUS at 17:22

## 2020-03-11 RX ADMIN — ROCURONIUM BROMIDE 100 MG: 10 INJECTION, SOLUTION INTRAVENOUS at 12:08

## 2020-03-11 RX ADMIN — PROPOFOL 20 MCG/KG/MIN: 10 INJECTION, EMULSION INTRAVENOUS at 12:14

## 2020-03-11 RX ADMIN — SODIUM CHLORIDE 1000 ML: 9 INJECTION, SOLUTION INTRAVENOUS at 13:01

## 2020-03-11 RX ADMIN — SODIUM CHLORIDE 10 MG/HR: 9 INJECTION, SOLUTION INTRAVENOUS at 12:14

## 2020-03-11 RX ADMIN — LORAZEPAM 2 MG: 2 INJECTION, SOLUTION INTRAMUSCULAR; INTRAVENOUS at 12:12

## 2020-03-11 RX ADMIN — LABETALOL HYDROCHLORIDE 20 MG: 5 INJECTION, SOLUTION INTRAVENOUS at 12:13

## 2020-03-11 RX ADMIN — MAGNESIUM SULFATE HEPTAHYDRATE 4 G: 40 INJECTION, SOLUTION INTRAVENOUS at 20:20

## 2020-03-11 RX ADMIN — LEVETIRACETAM 1000 MG: 100 INJECTION, SOLUTION INTRAVENOUS at 20:21

## 2020-03-11 RX ADMIN — ETOMIDATE 20 MG: 20 INJECTION, SOLUTION INTRAVENOUS at 12:08

## 2020-03-11 RX ADMIN — SODIUM CHLORIDE 2.5 MG/HR: 9 INJECTION, SOLUTION INTRAVENOUS at 13:32

## 2020-03-11 RX ADMIN — PROPOFOL 50 MCG/KG/MIN: 10 INJECTION, EMULSION INTRAVENOUS at 22:29

## 2020-03-11 RX ADMIN — FOLIC ACID: 5 INJECTION, SOLUTION INTRAMUSCULAR; INTRAVENOUS; SUBCUTANEOUS at 17:22

## 2020-03-11 RX ADMIN — LORAZEPAM 2 MG: 2 INJECTION, SOLUTION INTRAMUSCULAR; INTRAVENOUS at 12:02

## 2020-03-11 RX ADMIN — CEFTRIAXONE 1 G: 1 INJECTION, POWDER, FOR SOLUTION INTRAMUSCULAR; INTRAVENOUS at 20:18

## 2020-03-11 RX ADMIN — FOLIC ACID: 5 INJECTION, SOLUTION INTRAMUSCULAR; INTRAVENOUS; SUBCUTANEOUS at 13:22

## 2020-03-11 RX ADMIN — LEVETIRACETAM 1000 MG: 10 INJECTION INTRAVENOUS at 12:44

## 2020-03-11 RX ADMIN — SODIUM CHLORIDE 20 ML: 9 INJECTION, SOLUTION INTRAVENOUS at 18:37

## 2020-03-11 RX ADMIN — LEVETIRACETAM 1000 MG: 10 INJECTION INTRAVENOUS at 12:56

## 2020-03-11 ASSESSMENT — PULMONARY FUNCTION TESTS
PIF_VALUE: 15
PIF_VALUE: 15
PIF_VALUE: 16
PIF_VALUE: 13
PIF_VALUE: 17
PIF_VALUE: 16
PIF_VALUE: 19

## 2020-03-11 ASSESSMENT — PAIN SCALES - GENERAL
PAINLEVEL_OUTOF10: 0
PAINLEVEL_OUTOF10: 0
PAINLEVEL_OUTOF10: 3

## 2020-03-11 NOTE — PROGRESS NOTES
CC: Respiratory failure    Patient transferred from [facility] because of intracerebral hemorrhage. He had been found with left-sided weakness at his assisted living facility, andin the ED had apparent seizure activity. He became cyanotic, appeared to have respiratory arrest and was briefly pulseless. Very brief CPR performed, and he was intubated promptly. He is now seen on mechanical ventilation support and receiving IV sedation with propofol. Afebrile  WBC  NSR. /72  S1, S2 normal.  O2 saturation 100%, on ventilator with FiO2 50%. Breath sounds slightly coarse bilaterally. 5 ventilator support with assist control mode,  mL, rate 16, minute volume 8.0 L, FiO2 50%, PEEP 5 cm. On the settings, arterial pH 7.47, PCO2 41, PO2 103, base excess +7. Abdomen mildly obese, soft, no tenderness elicited. No mass organomegaly. 1+ pretibial edema on the left, trace edema on the right  Creatinine 1.4, sodium 139, potassium 3.8, chloride 92, CO2 18. Note creatinine on 2/14/2020 was 0.7  Urine tox screen negative for all tested drugs. Chest x-ray shows ETT in position 6 cm above the main marian. Minimal basilar subsegmental atelectasis. CT head shows acute intraparenchymal hematoma in the high right parietal region with 2 nodular components, larger of which measures 2.1 cm in largest dimension. Surrounding vasogenic edema, without significant mass-effect or midline shift. A&P: Acute intracerebral hemorrhage in left parietal region. Exhibited seizures, likely triggered by acute event, but note possible prior history of seizure disorder. Follow-up head CT for evolution of intracerebral hemorrhage. Blood pressure currently controlled less than systolic 125 mmHg. Maintain control at this level. Respiratory failure, appears to be associated with seizure. Stable after intubation and ventilator support. Modify support level 2 maintain acid-base homeostasis.   No acute respiratory

## 2020-03-11 NOTE — ED NOTES
Pt intubated per Dr. Pat Cerna with 7.5 tube. 23 at lip. Positive color change. Bilateral breath sounds heard.       John Rodriguez RN  03/11/20 1211       John Rodriguez RN  03/11/20 1212

## 2020-03-11 NOTE — H&P
nightly  · metroNIDAZOLE (METROCREAM) 0.75 % cream, Apply topically 2 times daily Apply topically 2 times daily. Allergies:  Patient has no known allergies. Social History:   · TOBACCO:   reports that he has never smoked. He has never used smokeless tobacco.  · ETOH:   reports current alcohol use of about 2.0 standard drinks of alcohol per week. · DRUGS : none  · Patient currently lives alone  ·   Family History:   · No family history on file. Review of Systems    ROS: Unable to obtain. Physical Exam  Constitutional:       General: He is not in acute distress. Appearance: He is not ill-appearing, toxic-appearing or diaphoretic. Comments: Vented, sedated   HENT:      Head: Normocephalic and atraumatic. Eyes:      Extraocular Movements: Extraocular movements intact. Pupils: Pupils are equal, round, and reactive to light. Neck:      Musculoskeletal: Neck supple. No neck rigidity. Cardiovascular:      Rate and Rhythm: Normal rate and regular rhythm. Pulses: Normal pulses. Heart sounds: Normal heart sounds. Pulmonary:      Effort: No respiratory distress. Breath sounds: Normal breath sounds. No stridor. No wheezing, rhonchi or rales. Comments: vented  Abdominal:      General: Abdomen is flat. Bowel sounds are normal. There is no distension. Palpations: Abdomen is soft. Tenderness: There is no abdominal tenderness. There is no guarding. Musculoskeletal: Normal range of motion. General: Swelling present. No deformity or signs of injury. Right lower leg: Edema present. Left lower leg: Edema present. Comments: Passive range of motion normal, L > R LE edema   Skin:     General: Skin is warm and dry. Capillary Refill: Capillary refill takes less than 2 seconds. Coloration: Skin is not jaundiced or pale. Findings: No bruising, erythema, lesion or rash.    Neurological:      Comments: PERRLA, withdraws to pain in all

## 2020-03-11 NOTE — CONSULTS
2/2 ETT in place  Sensation: Moves all extremities to noxious stimuli  Coordination: RADHA 2/2 encephalopathy    Cranial Nerves:  II: RADHA 2/2 encephalopathy  III, IV, VI: PERRL, 3 mm bilaterally, RADHA EOM 2/2 encephalopathy  V: RADHA Facial sensation intact 2/2 encephalopathy  VII: RADHA Face symmetric 2/2 ETT bennett in place  VIII: RADHA Hearing intact 2/2 encephalopathy  IX: RDAHA Palate movement 2/2 ETT in place  XI: RADHA Shoulder shrug 2/2 encephalopathy  XII: RDAHA Tongue midline 2/2 ETT in place    Musculoskeletal:   Gait: Not tested   Assist devices: None   Tone: Normal  Motor strength: RADHA 2/2 sedation, but does move all extremities to noxious stimuli    Radiological Findings:  Ct Head Wo Contrast  Result Date: 3/11/2020  1. Acute intraparenchymal hematoma in the high right parietal region has 2 main nodular components the larger measuring up to 2.1 cm. Mild surrounding vasogenic edema without significant mass effect or midline shift. 2.  Mild chronic small vessel ischemic disease. Cta Head Neck W Contrast & Ct Cervical Spine Wo Contrast  Result Date: 3/11/2020  1. No large vessel occlusion. No intracranial proximal large artery hemodynamically significant stenosis, occlusion or aneurysm. 2.  No hemodynamically significant stenosis of the bilateral cervical internal carotid arteries per NASCET criteria. Patent bilateral vertebral arteries. 3.  No acute cervical spine fracture. Multilevel degenerative changes in the cervical spine. 4.  Right parietal lobe acute intraparenchymal hematoma is better evaluated on noncontrast CT head done earlier same day. 5.  Ill-defined asymmetric soft tissue prominence in the left supraglottic airway is nonspecific and could be further evaluated with direct visualization as warranted.        Labs:  Recent Labs     03/11/20  1245   WBC 8.1   HGB 12.6*   HCT 38.1*   PLT 75*       Recent Labs     03/11/20  1245      K 3.8   CL 92*   CO2 18*   BUN 17   CREATININE 1.4*

## 2020-03-11 NOTE — ED PROVIDER NOTES
eMERGENCY dEPARTMENT eNCOUnter      PtName: Malinda Manuel  MRN: [id number]  [place] 1950  Date of evaluation: 3/11/2020  Provider: David Garsia, [address] [place] [address]       Chief Complaint   Patient presents with    Extremity Weakness     pt brought in by [facility] squad for left sided arm and leg weakness. stroke alert called in field. pt A&O x 4, no other deficits than left sided weakness. pt with history of alcoholism. eyes jaundiced. pt was found down. pt taken directly to CT scan. unknown last known well. HISTORY OF PRESENT ILLNESS   (Location/Symptom, Timing/Onset,Context/Setting, Quality, Duration, Modifying Factors, Severity) Note limiting factors. HPI    Malinda Manuel is a 71 y.o. male who presents to the emergency department with chief complaint of neurologic deficit. According to the patient he was normal yesterday before bed. He was found down on the floor of his assisted living center and found by the  of his assisted living facility on the ground with associated neuro deficits. EMS was called. Note that on chart review patient was recently admitted to the hospital with possible seizures and placed on Keppra. EMS reports left-sided weakness and deviation of the eyes to the left. Nursing Notes were reviewed. REVIEW OF SYSTEMS    (2+ forlevel 4; 10+ for level 5)     Review of Systems  See hpi for further details. Complete 10 point review of all systems performed and is otherwise negative unless noted above. PAST MEDICAL HISTORY     Past Medical History:   Diagnosis Date    Alcoholism [facility])     Cardiac arrest ([facility] [address] 08/2016    Depression     PAF (paroxysmal atrial fibrillation) ([facility])        SURGICAL HISTORY     History reviewed. No pertinent surgical history.     CURRENT MEDICATIONS       Previous Medications    LEVETIRACETAM (KEPPRA) 500 MG TABLET    Take 1 tablet by mouth 2 times daily    METOPROLOL SUCCINATE (TOPROL XL) 50 MG EXTENDED RELEASE TABLET Take 50 mg by mouth daily    METRONIDAZOLE (METROCREAM) 0.75 % CREAM    Apply topically 2 times daily Apply topically 2 times daily. NIFEDIPINE (PROCARDIA XL) 30 MG EXTENDED RELEASE TABLET    Take 30 mg by mouth daily    PANTOPRAZOLE (PROTONIX) 40 MG TABLET    Take 40 mg by mouth daily    SERTRALINE (ZOLOFT) 50 MG TABLET    Take 50 mg by mouth daily    TRAZODONE (DESYREL) 50 MG TABLET    Take 50 mg by mouth nightly       ALLERGIES     Patient has no known allergies. FAMILY HISTORY     History reviewed. No pertinent family history. SOCIAL HISTORY       Social History     Socioeconomic History    Marital status: Unknown     Spouse name: None    Number of children: None    Years of education: None    Highest education level: None   Occupational History    None   Social Needs    Financial resource strain: None    Food insecurity     Worry: None     Inability: None    Transportation needs     Medical: None     Non-medical: None   Tobacco Use    Smoking status: Never Smoker    Smokeless tobacco: Never Used   Substance and Sexual Activity    Alcohol use:  Yes     Alcohol/week: 2.0 standard drinks     Types: 2 Shots of liquor per week     Comment: states he had glass of whiskey three days ago    Drug use: Never    Sexual activity: None   Lifestyle    Physical activity     Days per week: None     Minutes per session: None    Stress: None   Relationships    Social connections     Talks on phone: None     Gets together: None     Attends Orthodox service: None     Active member of club or organization: None     Attends meetings of clubs or organizations: None     Relationship status: None    Intimate partner violence     Fear of current or ex partner: None     Emotionally abused: None     Physically abused: None     Forced sexual activity: None   Other Topics Concern    None   Social History Narrative    None       SCREENINGS           PHYSICAL EXAM    (5+ for level 4, 8+ for level 5)     ED rhythm with occasional PVCs no obvious ischemic ST change appreciated. RADIOLOGY (Per Emergency Physician): Interpretation per the Radiologist below, if available at the time of this note:  Ct Head Wo Contrast    Result Date: 3/11/2020  EXAMINATION: CT OF THE HEAD WITHOUT CONTRAST  3/11/2020 10:43 am TECHNIQUE: CT of the head was performed without the administration of intravenous contrast. Dose modulation, iterative reconstruction, and/or weight based adjustment of the mA/kV was utilized to reduce the radiation dose to as low as reasonably achievable. COMPARISON: 02/12/2020. HISTORY: ORDERING SYSTEM PROVIDED HISTORY: L sided deficits TECHNOLOGIST PROVIDED HISTORY: Has a \"code stroke\" or \"stroke alert\" been called? ->Yes Reason for exam:->L sided deficits Reason for Exam: L sided deficits; stroke sx's Acuity: Unknown Type of Exam: Unknown FINDINGS: CT HEAD: BRAIN/VENTRICLES: Acute intraparenchymal hematoma in the high right parietal region has 2 main nodular components measuring 2.1 x 1.4 x 1.9 cm and 0.8 x 0.4 x 1.0 cm. Mild surrounding vasogenic edema. No other acute intracranial hemorrhage. No significant mass effect or midline shift. No abnormal extra-axial fluid collection. The gray-white differentiation is maintained without evidence of an acute infarct. There is no evidence of hydrocephalus. Generalized cerebral and cerebellar volume loss. Mild ill-defined periventricular white matter hypoattenuation. ORBITS: The visualized portion of the orbits demonstrate no acute abnormality. SINUSES: The visualized paranasal sinuses and mastoid air cells demonstrate no acute abnormality. SOFT TISSUES/SKULL:  No acute displaced skull fracture. No large scalp hematoma. 1.  Acute intraparenchymal hematoma in the high right parietal region has 2 main nodular components the larger measuring up to 2.1 cm. Mild surrounding vasogenic edema without significant mass effect or midline shift.  2.  Mild chronic small vessel ischemic disease. Critical results were called by Dr. Josseline Garcia Sessions to [address] [place] [address] on 3/11/2020 at 11:55. Ct Cervical Spine Wo Contrast    Result Date: 3/11/2020  EXAMINATION: CTA OF THE HEAD AND NECK WITH CONTRAST; CT OF THE CERVICAL SPINE WITHOUT CONTRAST 3/11/2020 11:43 am: TECHNIQUE: CTA of the head and neck was performed with the administration of intravenous contrast. Multiplanar reformatted images are provided for review. MIP images are provided for review. Stenosis of the internal carotid arteries measured using NASCET criteria. Dose modulation, iterative reconstruction, and/or weight based adjustment of the mA/kV was utilized to reduce the radiation dose to as low as reasonably achievable.; CT of the cervical spine was performed without the administration of intravenous contrast. Multiplanar reformatted images are provided for review. Dose modulation, iterative reconstruction, and/or weight based adjustment of the mA/kV was utilized to reduce the radiation dose to as low as reasonably achievable. COMPARISON: 02/12/2020. HISTORY: ORDERING SYSTEM PROVIDED HISTORY: L sided deficits - stroke alert TECHNOLOGIST PROVIDED HISTORY: Reason for exam:->L sided deficits - stroke alert Reason for Exam: L sided deficits; stroke sx's Acuity: Unknown Type of Exam: Unknown FINDINGS: CTA NECK: AORTIC ARCH/ARCH VESSELS: Atherosclerosis of the visualized thoracic aorta. No evidence of aneurysm or dissection in the visualized thoracic aorta. Atherosclerosis of the right brachiocephalic and bilateral subclavian arteries without hemodynamically significant stenosis. CAROTID ARTERIES: Mild atherosclerosis of the bilateral common carotid arteries without hemodynamically significant stenosis. Bilateral carotid bulb atherosclerotic plaque. Less than 25% stenosis of the right cervical internal carotid artery origin secondary to atherosclerotic plaque per NASCET criteria.   No stenosis of the left cervical internal carotid artery per NASCET criteria. No evidence of acute dissection in the bilateral carotid arteries in the neck. VERTEBRAL ARTERIES: Mild narrowing of the right vertebral artery origin secondary to atherosclerotic plaque. The left vertebral artery origin is widely patent. Otherwise, both cervical vertebral arteries are normal without hemodynamically significant stenosis, occlusion or evidence of acute dissection. SOFT TISSUES: The lung apices are clear. No cervical or superior mediastinal lymphadenopathy. Ill-defined asymmetric soft tissue prominence in the left supraglottic airway is nonspecific. No acute abnormality of the salivary and thyroid glands. BONES: No acute osseous abnormality. CTA HEAD: ANTERIOR CIRCULATION: No significant stenosis of the intracranial internal carotid, anterior cerebral, or middle cerebral arteries. No aneurysm. POSTERIOR CIRCULATION: No significant stenosis of the vertebral, basilar, or posterior cerebral arteries. No aneurysm. OTHER: No dural venous sinus thrombosis on this non-dedicated study. BRAIN: Right parietal lobe acute intraparenchymal hematoma is better evaluated on noncontrast CT head done earlier same day. CT CERVICAL SPINE: No acute fracture in the cervical spine. Straightening of the normal cervical lordosis. Mild multilevel degenerative listhesis. Osseous fusion of C6-C7. The craniocervical junction is intact. Multilevel degenerative changes throughout the cervical spine. No paraspinal mass. 1.  No large vessel occlusion. No intracranial proximal large artery hemodynamically significant stenosis, occlusion or aneurysm. 2.  No hemodynamically significant stenosis of the bilateral cervical internal carotid arteries per NASCET criteria. Patent bilateral vertebral arteries. 3.  No acute cervical spine fracture. Multilevel degenerative changes in the cervical spine.  4.  Right parietal lobe acute intraparenchymal hematoma is better evaluated on noncontrast Right parietal lobe acute intraparenchymal hematoma is better evaluated on noncontrast CT head done earlier same day. 5.  Ill-defined asymmetric soft tissue prominence in the left supraglottic airway is nonspecific and could be further evaluated with direct visualization as warranted. LABS:  Labs Reviewed   CBC WITH AUTO DIFFERENTIAL   COMPREHENSIVE METABOLIC PANEL W/ REFLEX TO MG FOR LOW K   TROPONIN   PROTIME-INR   URINE RT REFLEX TO CULTURE   MAGNESIUM   POCT GLUCOSE       All other labs were within normal range or not returned as of this dictation.     EMERGENCY DEPARTMENT COURSE and DIFFERENTIAL DIAGNOSIS/MDM:   Vitals:    Vitals:    03/11/20 1154 03/11/20 1200 03/11/20 1205 03/11/20 1208   BP: (!) 171/84 (!) 179/94 (!) 192/66 (!) 179/102   Pulse:  106 122 118   Resp:  24 20 12   Temp:       TempSrc:       SpO2:   99% 100%   Weight:       Height:           Medications   0.9 % sodium chloride bolus (has no administration in time range)   ondansetron (ZOFRAN) injection 4 mg (has no administration in time range)   etomidate (AMIDATE) 2 MG/ML injection (has no administration in time range)   succinylcholine (ANECTINE) 200 MG/10ML injection (has no administration in time range)   niCARdipine (CARDENE) 25 mg in sodium chloride 0.9 % 250 mL infusion (0 mg/hr Intravenous Stopped 3/11/20 1225)   propofol injection (15 mcg/kg/min × 81 kg Intravenous Rate/Dose Change 3/11/20 1225)   levetiracetam (KEPPRA) 1000 mg/100 mL IVPB (1,000 mg Intravenous New Bag 3/11/20 1244)   tranexamic acid (CYKLOKAPRON) 1,000 mg in sodium chloride 0.9 % 50 mL IVPB (has no administration in time range)   levetiracetam (KEPPRA) 1000 mg/100 mL IVPB (has no administration in time range)   sodium chloride 0.9 % 7,717 mL with folic acid 1 mg, adult multi-vitamin with vitamin k 10 mL, thiamine 100 mg (has no administration in time range)   iopamidol (ISOVUE-370) 76 % injection 75 mL (75 mLs Intravenous Given 3/11/20 1146)   LORazepam (ATIVAN) injection 2 mg (2 mg Intravenous Given 3/11/20 1202)   LORazepam (ATIVAN) injection 2 mg (2 mg Intravenous Given 3/11/20 1212)   labetalol (NORMODYNE;TRANDATE) injection 20 mg (20 mg Intravenous Given 3/11/20 1213)   etomidate (AMIDATE) injection 20 mg (20 mg Intravenous Given 3/11/20 1208)   rocuronium (ZEMURON) injection 100 mg (100 mg Intravenous Given 3/11/20 1208)       [facility]. Patient brought immediately to the CT scan room and evaluated immediately in CT scan room. Stroke alert initiated. Patient does have intracranial hemorrhage right parietal region per my interpretation. CT C-spine was also added on due to possible fall. X-ray of the left forearm ordered due to possible injury. Case discussed with stroke physician-patient is not a TPA candidate due to bleed. Case discussed with VENU vest covering for Dr. Saray Edgar -neurosurgeon at [facility]..  Recommends transfer to [facility].  I did request access center discuss to see if MICU is flying. Awaiting callback from hospitalist.    Note that I was called immediately into the room as the patient appeared to be cyanotic and not breathing. Unsure if pulse was present so CPR initiated. BVM initiated. Ativan given. Patient is now breathing on own with BVM assistance. Plan for intubation for airway protection. Patient intubated with glide scope. TXA, Keppra, Cardene ordered. While waiting from the Cardene to come down from the pharmacy labetalol was ordered to lower the blood pressure which was elevated. Additional Ativan was given. Propofol drip initiated. No further seizures observed. Case discussed with Dr. Rebecca Jiang hospitalist.  [facility]. to notify intensivist at [address] [place] [address]. X-ray per my interpretation of the left forearm does not appear to show fracture. Final report pending at the time of this note. Patient with asymmetric soft tissue prominence of the supraglottic airway on CT C-spine.   Defer to primary team.  Intubation with glide scope was moderately difficult although no obvious mass seen. Also nursing reports history of possible alcoholism so banana bag was ordered as well. CRITICAL CARE TIME: 65 minutes excluding billable procedure time: Treatment of patient with intracranial hemorrhage, reduction of elevated blood pressure, treatment of patient with seizures, discussions with neurosurgery, hospitalist, critical care team accepting transfer, potential for deterioration. Treatment of critically ill patient. Reviewed interpretation of results and tachycardia mentation. Note that I also spoke with the intensivist about the case who agrees with the care. Air care is currently not flying so MICU will come to transport the patient. CONSULTS:  IP CONSULT TO NEUROSURGERY  IP CONSULT TO HOSPITALIST    PROCEDURES:  Intubation Procedure Note    Indication: Respiratory failure, comatose state and airway protection    Consent: Unable to be obtained due to the emergent nature of this procedure. Medications Used: etomidate intravenously and rocuronium intravenously    Procedure: The patient was placed in the appropriate position. Cricoid pressure was utilized. Intubation was performed using Glidescope video technology and a 7.5 cuffed endotracheal tube. Suction of the oropharynx was not required prior to intubation. The cuff was then inflated and the tube was secured appropriately at a distance of 23 cm to the corner of the mouth. Initial confirmation of placement included bilateral breath sounds, an end tidal CO2 detector, absence of sounds over the stomach, tube fogging, adequate chest rise and adequate pulse oximetry reading. A chest x-ray to verify correct placement of the tube showed the tube in the appropriate position however we did advance 1 cm. Repeat chest x-ray shows improved positioning. .    The patient tolerated the procedure well.      Complications: None         Procedures    FINAL IMPRESSION      1. Right-sided nontraumatic intracerebral hemorrhage, unspecified cerebral location ([facility] [address]    2. Abnormal CT scan, cervical spine    3. Hypertensive emergency    4. Seizure [facility])          DISPOSITION/PLAN   DISPOSITION Decision To Transfer 03/11/2020 11:43:51 AM      PATIENT REFERRED TO:  No follow-up provider specified. DISCHARGE MEDICATIONS:  New Prescriptions    No medications on file          (Please note:  Portions of this note were completed with a voice recognition program. Efforts were made to edit the dictations but occasionally words and phrases are mis-transcribed.)    Form v2016. J.[id number]    Nichol House DO (electronically signed)  Emergency Medicine Provider              Anneliese Galarza DO  03/11/20 1337

## 2020-03-11 NOTE — ED NOTES
[address] given per verbal order Dr. Remedios Biggs by Jamila Hernandez RN.      Katty Christiansen RN  03/11/20 2742

## 2020-03-11 NOTE — ED NOTES
piv x4 wnl, pt resting with eyes closed, propfol gtt infusing, pupils 3 perrl, clear bilateral, ett 7.5 oral 24 at lip to vent, strong pulses x4, abdomen soft, round, decreased bowel sounds, medina intact, dependent edema of bilateral lower extremities,      Terri Bower, RN  03/11/20 7422

## 2020-03-11 NOTE — ED NOTES
2 mg IV Ativan administered per verbal order by Dr. Brionna Ramos by Radha Seals RN.       Stacy Reed RN  03/11/20 2313

## 2020-03-11 NOTE — CONSULTS
Home Med List is complete. Source of medications in list is pts relative/friend? Pt's friend is unsure if he has had any medications recently.      Please Note:  [facility] and [facility]    3/11/2020 6:54 PM  [address] Intern

## 2020-03-11 NOTE — ED NOTES
20 mg IV Labetalol given per verbal order Dr. Berlin Cooley, by Tom Martínez RN.       Santosh Maldonado RN  03/11/20 1915

## 2020-03-11 NOTE — ED NOTES
Robert Vanessa, ICU RN at [facility], to update that pt to be picked up at 1400; verbalized understanding.       Ever Clarke RN  03/11/20 0298

## 2020-03-11 NOTE — ED NOTES
Report given to Lennette Krabbe at bedside, pt to be transferred via [facility] flight, ground transportation.  Pt to be transferred to Deloras Schwab, RN  03/11/20 1400

## 2020-03-11 NOTE — ED NOTES
Xray at bedside. Came out to desk to report pt was blue in face and not breathing. Upon arrival to room, pt blue in face and appears to be seizing. Dr. [place] Solon called to bedside.       Lien Sahni RN  03/11/20 2800

## 2020-03-11 NOTE — ED NOTES
Pt taken to room 15 from CT. Pt alert and talking with this RN. Upon arrival to room, EKG completed, pt changed into gown and triage started.      Imelda Frederick RN  03/11/20 8802

## 2020-03-11 NOTE — ED NOTES
Bed: 15  Expected date:   Expected time:   Means of arrival: [facility]  Comments:  FF [address]  03/11/20 1138

## 2020-03-12 ENCOUNTER — APPOINTMENT (OUTPATIENT)
Dept: GENERAL RADIOLOGY | Age: 70
DRG: 957 | End: 2020-03-12
Attending: INTERNAL MEDICINE
Payer: MEDICARE

## 2020-03-12 ENCOUNTER — APPOINTMENT (OUTPATIENT)
Dept: VASCULAR LAB | Age: 70
DRG: 957 | End: 2020-03-12
Attending: INTERNAL MEDICINE
Payer: MEDICARE

## 2020-03-12 LAB
ANION GAP SERPL CALCULATED.3IONS-SCNC: 18 MMOL/L (ref 3–16)
BASOPHILS ABSOLUTE: 0 K/UL (ref 0–0.2)
BASOPHILS RELATIVE PERCENT: 0.3 %
BUN BLDV-MCNC: 16 MG/DL (ref 7–20)
CALCIUM SERPL-MCNC: 7.9 MG/DL (ref 8.3–10.6)
CHLORIDE BLD-SCNC: 100 MMOL/L (ref 99–110)
CHOLESTEROL, TOTAL: 107 MG/DL (ref 0–199)
CO2: 23 MMOL/L (ref 21–32)
CREAT SERPL-MCNC: 0.9 MG/DL (ref 0.8–1.3)
EOSINOPHILS ABSOLUTE: 0 K/UL (ref 0–0.6)
EOSINOPHILS RELATIVE PERCENT: 0.4 %
GFR AFRICAN AMERICAN: >60
GFR NON-AFRICAN AMERICAN: >60
GLUCOSE BLD-MCNC: 100 MG/DL (ref 70–99)
HCT VFR BLD CALC: 33.4 % (ref 40.5–52.5)
HDLC SERPL-MCNC: 34 MG/DL (ref 40–60)
HEMOGLOBIN: 11.1 G/DL (ref 13.5–17.5)
INR BLD: 1.18 (ref 0.86–1.14)
LDL CHOLESTEROL CALCULATED: 58 MG/DL
LYMPHOCYTES ABSOLUTE: 0.6 K/UL (ref 1–5.1)
LYMPHOCYTES RELATIVE PERCENT: 8.5 %
MAGNESIUM: 1.7 MG/DL (ref 1.8–2.4)
MAGNESIUM: 2.2 MG/DL (ref 1.8–2.4)
MCH RBC QN AUTO: 32.3 PG (ref 26–34)
MCHC RBC AUTO-ENTMCNC: 33.3 G/DL (ref 31–36)
MCV RBC AUTO: 96.9 FL (ref 80–100)
MONOCYTES ABSOLUTE: 0.7 K/UL (ref 0–1.3)
MONOCYTES RELATIVE PERCENT: 8.9 %
NEUTROPHILS ABSOLUTE: 6 K/UL (ref 1.7–7.7)
NEUTROPHILS RELATIVE PERCENT: 81.9 %
PDW BLD-RTO: 18.3 % (ref 12.4–15.4)
PLATELET # BLD: 113 K/UL (ref 135–450)
PMV BLD AUTO: 8.5 FL (ref 5–10.5)
POTASSIUM SERPL-SCNC: 2.7 MMOL/L (ref 3.5–5.1)
POTASSIUM SERPL-SCNC: 2.9 MMOL/L (ref 3.5–5.1)
POTASSIUM SERPL-SCNC: 3.7 MMOL/L (ref 3.5–5.1)
PROTHROMBIN TIME: 13.7 SEC (ref 10–13.2)
RBC # BLD: 3.45 M/UL (ref 4.2–5.9)
SODIUM BLD-SCNC: 141 MMOL/L (ref 136–145)
TRIGL SERPL-MCNC: 76 MG/DL (ref 0–150)
URINE CULTURE, ROUTINE: NORMAL
VLDLC SERPL CALC-MCNC: 15 MG/DL
WBC # BLD: 7.3 K/UL (ref 4–11)

## 2020-03-12 PROCEDURE — 36415 COLL VENOUS BLD VENIPUNCTURE: CPT

## 2020-03-12 PROCEDURE — 2000000000 HC ICU R&B

## 2020-03-12 PROCEDURE — 84132 ASSAY OF SERUM POTASSIUM: CPT

## 2020-03-12 PROCEDURE — 80048 BASIC METABOLIC PNL TOTAL CA: CPT

## 2020-03-12 PROCEDURE — 83036 HEMOGLOBIN GLYCOSYLATED A1C: CPT

## 2020-03-12 PROCEDURE — 94770 HC ETCO2 MONITOR DAILY: CPT

## 2020-03-12 PROCEDURE — 83735 ASSAY OF MAGNESIUM: CPT

## 2020-03-12 PROCEDURE — 99291 CRITICAL CARE FIRST HOUR: CPT | Performed by: INTERNAL MEDICINE

## 2020-03-12 PROCEDURE — 94761 N-INVAS EAR/PLS OXIMETRY MLT: CPT

## 2020-03-12 PROCEDURE — 80061 LIPID PANEL: CPT

## 2020-03-12 PROCEDURE — 2700000000 HC OXYGEN THERAPY PER DAY

## 2020-03-12 PROCEDURE — 6360000002 HC RX W HCPCS: Performed by: STUDENT IN AN ORGANIZED HEALTH CARE EDUCATION/TRAINING PROGRAM

## 2020-03-12 PROCEDURE — 2580000003 HC RX 258: Performed by: NURSE PRACTITIONER

## 2020-03-12 PROCEDURE — 6360000002 HC RX W HCPCS: Performed by: NURSE PRACTITIONER

## 2020-03-12 PROCEDURE — 95813 EEG EXTND MNTR 61-119 MIN: CPT

## 2020-03-12 PROCEDURE — 86341 ISLET CELL ANTIBODY: CPT

## 2020-03-12 PROCEDURE — 2580000003 HC RX 258: Performed by: STUDENT IN AN ORGANIZED HEALTH CARE EDUCATION/TRAINING PROGRAM

## 2020-03-12 PROCEDURE — 86255 FLUORESCENT ANTIBODY SCREEN: CPT

## 2020-03-12 PROCEDURE — 74018 RADEX ABDOMEN 1 VIEW: CPT

## 2020-03-12 PROCEDURE — 94003 VENT MGMT INPAT SUBQ DAY: CPT

## 2020-03-12 PROCEDURE — 94750 HC PULMONARY COMPLIANCE STUDY: CPT

## 2020-03-12 PROCEDURE — 2580000003 HC RX 258: Performed by: SURGERY

## 2020-03-12 PROCEDURE — 93970 EXTREMITY STUDY: CPT

## 2020-03-12 PROCEDURE — 85025 COMPLETE CBC W/AUTO DIFF WBC: CPT

## 2020-03-12 PROCEDURE — 85610 PROTHROMBIN TIME: CPT

## 2020-03-12 PROCEDURE — C9113 INJ PANTOPRAZOLE SODIUM, VIA: HCPCS | Performed by: STUDENT IN AN ORGANIZED HEALTH CARE EDUCATION/TRAINING PROGRAM

## 2020-03-12 PROCEDURE — 83519 RIA NONANTIBODY: CPT

## 2020-03-12 RX ORDER — LABETALOL 20 MG/4 ML (5 MG/ML) INTRAVENOUS SYRINGE
10 EVERY 4 HOURS PRN
Status: DISCONTINUED | OUTPATIENT
Start: 2020-03-12 | End: 2020-03-12

## 2020-03-12 RX ORDER — POTASSIUM CHLORIDE 7.45 MG/ML
10 INJECTION INTRAVENOUS
Status: COMPLETED | OUTPATIENT
Start: 2020-03-12 | End: 2020-03-12

## 2020-03-12 RX ORDER — MAGNESIUM SULFATE IN WATER 40 MG/ML
2 INJECTION, SOLUTION INTRAVENOUS ONCE
Status: COMPLETED | OUTPATIENT
Start: 2020-03-12 | End: 2020-03-12

## 2020-03-12 RX ORDER — HYDRALAZINE HYDROCHLORIDE 20 MG/ML
10 INJECTION INTRAMUSCULAR; INTRAVENOUS EVERY 4 HOURS PRN
Status: DISCONTINUED | OUTPATIENT
Start: 2020-03-12 | End: 2020-03-18 | Stop reason: HOSPADM

## 2020-03-12 RX ORDER — PANTOPRAZOLE SODIUM 40 MG/10ML
40 INJECTION, POWDER, LYOPHILIZED, FOR SOLUTION INTRAVENOUS DAILY
Status: DISCONTINUED | OUTPATIENT
Start: 2020-03-12 | End: 2020-03-18 | Stop reason: HOSPADM

## 2020-03-12 RX ORDER — LABETALOL 20 MG/4 ML (5 MG/ML) INTRAVENOUS SYRINGE
10 EVERY 4 HOURS PRN
Status: DISCONTINUED | OUTPATIENT
Start: 2020-03-12 | End: 2020-03-18 | Stop reason: HOSPADM

## 2020-03-12 RX ORDER — SODIUM CHLORIDE 9 MG/ML
INJECTION, SOLUTION INTRAVENOUS CONTINUOUS
Status: DISCONTINUED | OUTPATIENT
Start: 2020-03-12 | End: 2020-03-15

## 2020-03-12 RX ORDER — HYDRALAZINE HYDROCHLORIDE 20 MG/ML
10 INJECTION INTRAMUSCULAR; INTRAVENOUS EVERY 4 HOURS PRN
Status: DISCONTINUED | OUTPATIENT
Start: 2020-03-12 | End: 2020-03-12

## 2020-03-12 RX ADMIN — PANTOPRAZOLE SODIUM 40 MG: 40 INJECTION, POWDER, FOR SOLUTION INTRAVENOUS at 13:14

## 2020-03-12 RX ADMIN — Medication 10 ML: at 09:19

## 2020-03-12 RX ADMIN — LEVETIRACETAM 1000 MG: 100 INJECTION, SOLUTION INTRAVENOUS at 09:19

## 2020-03-12 RX ADMIN — POTASSIUM CHLORIDE 10 MEQ: 10 INJECTION, SOLUTION INTRAVENOUS at 06:05

## 2020-03-12 RX ADMIN — PROPOFOL 40 MCG/KG/MIN: 10 INJECTION, EMULSION INTRAVENOUS at 11:07

## 2020-03-12 RX ADMIN — CEFTRIAXONE 1 G: 1 INJECTION, POWDER, FOR SOLUTION INTRAMUSCULAR; INTRAVENOUS at 20:22

## 2020-03-12 RX ADMIN — POTASSIUM CHLORIDE 10 MEQ: 10 INJECTION, SOLUTION INTRAVENOUS at 07:26

## 2020-03-12 RX ADMIN — SODIUM CHLORIDE: 9 INJECTION, SOLUTION INTRAVENOUS at 09:19

## 2020-03-12 RX ADMIN — POTASSIUM CHLORIDE 10 MEQ: 10 INJECTION, SOLUTION INTRAVENOUS at 19:13

## 2020-03-12 RX ADMIN — PROPOFOL 50 MCG/KG/MIN: 10 INJECTION, EMULSION INTRAVENOUS at 02:39

## 2020-03-12 RX ADMIN — MAGNESIUM SULFATE HEPTAHYDRATE 2 G: 40 INJECTION, SOLUTION INTRAVENOUS at 15:49

## 2020-03-12 RX ADMIN — POTASSIUM CHLORIDE 10 MEQ: 10 INJECTION, SOLUTION INTRAVENOUS at 09:09

## 2020-03-12 RX ADMIN — PROPOFOL 50 MCG/KG/MIN: 10 INJECTION, EMULSION INTRAVENOUS at 15:01

## 2020-03-12 RX ADMIN — POTASSIUM CHLORIDE 10 MEQ: 10 INJECTION, SOLUTION INTRAVENOUS at 15:49

## 2020-03-12 RX ADMIN — POTASSIUM CHLORIDE 10 MEQ: 10 INJECTION, SOLUTION INTRAVENOUS at 15:02

## 2020-03-12 RX ADMIN — PROPOFOL 50 MCG/KG/MIN: 10 INJECTION, EMULSION INTRAVENOUS at 06:29

## 2020-03-12 RX ADMIN — POTASSIUM CHLORIDE 10 MEQ: 10 INJECTION, SOLUTION INTRAVENOUS at 11:08

## 2020-03-12 RX ADMIN — POTASSIUM CHLORIDE 10 MEQ: 10 INJECTION, SOLUTION INTRAVENOUS at 09:49

## 2020-03-12 RX ADMIN — LEVETIRACETAM 1000 MG: 100 INJECTION, SOLUTION INTRAVENOUS at 20:25

## 2020-03-12 RX ADMIN — POTASSIUM CHLORIDE 10 MEQ: 10 INJECTION, SOLUTION INTRAVENOUS at 17:00

## 2020-03-12 RX ADMIN — POTASSIUM CHLORIDE 10 MEQ: 10 INJECTION, SOLUTION INTRAVENOUS at 18:07

## 2020-03-12 RX ADMIN — PROPOFOL 50 MCG/KG/MIN: 10 INJECTION, EMULSION INTRAVENOUS at 18:34

## 2020-03-12 RX ADMIN — PROPOFOL 50 MCG/KG/MIN: 10 INJECTION, EMULSION INTRAVENOUS at 22:52

## 2020-03-12 ASSESSMENT — PAIN SCALES - GENERAL: PAINLEVEL_OUTOF10: 0

## 2020-03-12 ASSESSMENT — PULMONARY FUNCTION TESTS
PIF_VALUE: 16
PIF_VALUE: 20
PIF_VALUE: 20
PIF_VALUE: 17
PIF_VALUE: 18
PIF_VALUE: 15

## 2020-03-12 NOTE — PROGRESS NOTES
CONTINUOUS EEG    Name:  Todd Oneill Record Number:  [id number]  Age: 71 y.o. Gender: male  : 1950  Today's Date:  3/12/2020  Room:  4561/2183-00  Vital Signs   /83   Pulse 62   Temp 98.8 °F (37.1 °C) (Axillary)   Resp 16   Ht 5' 9\" (1.753 m)   Wt 168 lb 6.9 oz (76.4 kg)   SpO2 99%   BMI 24.87 kg/m²       Patient currently on continuous EEG monitoring. All EEG leads are currently in place with no current issues. Verified [employer] connection via team viewer. Checked in with patient RN for current plan of care. Comments: Continue monitoring. All leads within 10K limit.     Electronically signed by Mary Chan on 3/12/2020 at 9:56 AM

## 2020-03-12 NOTE — PROCEDURES
Continuous EEG monitoring record    Patient name: Kaylin Hernandez    START: 03/11/2020 @ 23:18pm  END: 03/12/2020 @ 08:00am      Electroencephalographer: Latanya Mckeon MD PhD      CLINICAL DETAILS:  This EEG was performed on this 71 y. o.yo male admitted for Altered mental Status and concer for subclinical seizures      TECHNICAL DETAILS:  Continuous video-EEG monitoring was performed with 27 surface scalp electrodes placed according to the International 10-20 electrode placement system, using a 32-channel [employer] headbox. All EEG and video information was acquired digitally, including the use of automated spike and seizure detection software to detect epileptiform activity. An event button was also available to be depressed during clinical events. 03/11/2020 23:18pm  - 08:00am on 03/12/2020  SEIZURES:  None  INTERICTAL:  None  PUSHBUTTONS:  None  BACKGROUND:  Abundant generalized alpha/beta frequencies   EKG:  regular    CLINICAL INTERPRETATION:  This was an abnormal tracing for age and state due to a mild to moderate generalized slow wave abnormality indicating diffuse cerebral dysfunction which can be of multiple causes, including structural, or vascular abnormalities, toxic/metabolic conditions, hydrocephalus, or postictal conditions. No epileptiform discharge, focal slowing, or seizures were seen during this recording. Clinical correlation is advised.         Latanya Mckeon MD PhD

## 2020-03-12 NOTE — CONSULTS
NEUROSURGERY CONSULT NOTE    Rodolfo Bullard  [id number]   1950   3/12/2020    Requesting physician: Cheryl Mckeon MD    Reason for consultation: [facility]    History of present illness: Patient is a 71 y.o. male w/ PMH of alcoholism, depression, pAF, HTN, CAD hx of NSTEMI, seizure disorder who presented on 3/12/2020 to [facility] ED after being found down at assisted living facility. Patient unable to provide history 2/2 ETT in place and encephalopathic. Information provided by previous provider and nursing notes. According to the patient he was normal yesterday before bed. He was found down on the floor of his assisted living center and found by the  of his assisted living facility on the ground with associated neuro deficits. EMS was called. Note that on chart review patient was recently admitted to the hospital with possible seizures and placed on Keppra. EMS reports left-sided weakness and deviation of the eyes to the left. CT-head revealed left parietal bleed. Ativan 2mg x 2 administered for possible seizure. Loaded with Keppra. Patient became cyanotic and pulse was not palpated and appeared to stop breathing prompting CPR and RSI. After patient was stabilized he was hypertensive requiring nicardipine gtt. ROS:   RADHA 2/2 sedated and ETT in place    No Known Allergies    Past Medical History:   Diagnosis Date    [facility]ism [facility])     Cardiac arrest ([facility] 08/2016    Depression     PAF (paroxysmal atrial fibrillation) (HCC)         No past surgical history on file. Social History     Occupational History    Not on file   Tobacco Use    Smoking status: Never Smoker    Smokeless tobacco: Never Used   Substance and Sexual Activity    Alcohol use: Yes     Alcohol/week: 2.0 standard drinks     Types: 2 Shots of liquor per week     Comment: states he had glass of whiskey three days ago    Drug use: Never    Sexual activity: Not on file        No family history on file.      No outpatient Torri Metz MD            Objective:  /77   Pulse 64   Temp 97.7 °F (36.5 °C) (Axillary)   Resp 16   Ht 5' 9\" (1.753 m)   Wt 168 lb 6.9 oz (76.4 kg)   SpO2 97%   BMI 24.87 kg/m²     Physical Exam:   Patient seen and examined  GCS:  1 - Does not open eyes  T - ETT in Place  4 - Moves part of body but does not remove noxious stimulus  General: Ill appearing, encephalopathic, sedated on Propofol and ETT in place  HENT: ETT in place  Eyes: Optic discs: Not tested  Pulmonary: Ventilator support  Cardiovascular:  Warm well perfused. No peripheral edema  Gastrointestinal: abdomen soft, NT, ND    Neurological:  Mental Status: Sedated  Attention: RADHA 2/2 encephalopathy  Language: RADHA 2/2 ETT in place  Sensation: Moves all extremities to noxious stimuli  Coordination: RADHA 2/2 encephalopathy    Cranial Nerves:  II: RADHA 2/2 encephalopathy  III, IV, VI: PERRL, 3 mm bilaterally, RADHA EOM 2/2 encephalopathy  V: RADHA Facial sensation intact 2/2 encephalopathy  VII: RADHA Face symmetric 2/2 ETT bennett in place  VIII: RADHA Hearing intact 2/2 encephalopathy  IX: RADHA Palate movement 2/2 ETT in place  XI: RADHA Shoulder shrug 2/2 encephalopathy  XII: RADHA Tongue midline 2/2 ETT in place    Musculoskeletal:   Gait: Not tested   Assist devices: None   Tone: Normal  Motor strength: RADHA 2/2 sedation, but does move all extremities to noxious stimuli    Radiological Findings:    I personally reviewed the patient's imaging which consisted a CT of the head dated 3/11/2020 as well as a CTA dated 3/11/2020. The CT demonstrates a right parietal small intraparenchymal hemorrhage. There is mild surrounding vasogenic edema without significant mass effect. CT head demonstrates no acute abnormalities.       Labs:  Recent Labs     03/12/20 0422   WBC 7.3   HGB 11.1*   HCT 33.4*   *       Recent Labs     03/12/20  0422 03/12/20  1304     --    K 2.9* 2.7*     --    CO2 23  --    BUN 16  --    CREATININE 0.9  --    GLUCOSE 100*  -- CALCIUM 7.9*  --    MG  --  1.70*       Recent Labs     03/12/20  0422   PROTIME 13.7*   INR 1.18*       Patient Active Problem List    Diagnosis Date Noted    Intracranial hemorrhage ([facility] 03/11/2020    [facility] and [facility] 03/11/2020    Essential hypertension 03/11/2020    Acute encephalopathy     Hypokalemia     New onset seizure ([facility] 02/12/2020    GERD (gastroesophageal reflux disease) 12/05/2019    HTN (hypertension), benign 12/05/2019    Depression 12/05/2019    Elevated d-dimer 12/05/2019    NSTEMI (non-ST elevated myocardial infarction) ([facility] 12/04/2019       Assessment:  Patient is a 71 y.o. male w/seizure activity and acute intraparenchymal hematoma in the high right parietal region. Plan:  1. No neurosurgical intervention indicated. It is unlikely that the intraparenchymal hemorrhage is contributing to the patient's poor neurologic exam.  2. Neurologic exams frequency:   - ICU: Q1H until otherwise directed  3. ICH:  - CT Head w/o contrast 6 hours from previous scan  - MRI Brain w wo when stable  - Maintain SBP <160; If PRN med insufficient, then may start Nicardipine infusion  - Hold all full dose anticoagulation & antiplatelet for 2 weeks  - Keep Plt >100k & INR <1.4  4. Cerebral edema:  - Keep HOB >30 degrees  - Keep Na WNL  5. Seizure:  - Keppra 1000 mg BID  - cvEEG  - Neurology consult  6. Will follow inpatient. Please call with any questions or decline in neurological status      Thank you for the consultation.     Sidney Veliz MD, PhD  [address], [address], [address], [address] [phone] (c), [phone] (o)

## 2020-03-12 NOTE — PROGRESS NOTES
right parietal intraparenchymal hematoma with mild vasogenic edema:   Continue neuro checks. Maintain systolic blood pressure less than 160. Hold AC and antiplatelets  Repeat CT head stable. Follow neurosurgery recommendation. Seizures:   Patient on ventilator for airway support for airway protection. Vent management per pulmonology. Continue Keppra and cvEEG  MRI brain  Follow neurology recs    Hypertensive emergency: Resolved  Off nicardipine drip. BP stable now. Maintain systolic blood pressure <743    Acute metabolic encephalopathy:   Secondary to above. Continue management as above. Follow up on tox screen    Possible NSTEMI:   Might be secondary to decreased renal clearance. EKG negative for acute ST segment changes. Monitor troponin, stable    Acute kidney injury: Resolved  Likely prerenal. CK negative. Continue IV fluids    Possible UTI:   UA positive for LE and nitrite. Follow cx. IV ceftriaxone in NS. No dextrose    Bilaterlal acute and chronic LE DVT:   Dopplers positive for acute and chronic DVT  XR KUB to check for previous IVC filter  IR consulted for IVC filter placement    History of A.  Fib:   Anticoagulation contraindicated due to fall risk, seizure disorder and recent brain bleed    CAD status post and STEMI:   Continue statin and Toprol    Alcohol abuse:   Cont CIWA protocol    Hypomagnesemia/Hypokalemia:  Monitor and replace as needed    DVT Prophylaxis: SCD's  Diet: Diet NPO Effective Now  Code Status: Full Code    PT/OT Eval Status: Once stable    Dispo -see you    Radha Mason MD

## 2020-03-12 NOTE — PLAN OF CARE
continue to monitor and reassess. Outcome: Ongoing     Problem: Physical Regulation:  Goal: Signs of adequate cerebral perfusion will increase  Description: Signs of adequate cerebral perfusion will increase. Patient remains on continuous EEG. No seizure activity noted thus far this shift. Patient remains sedated per MAR. Routine neuro checks. Will continue to monitor and reassess.     Outcome: Ongoing

## 2020-03-12 NOTE — CONSULTS
week.  · DRUGS : none  · Patient currently lives alone (assisted living)  ·   Family History:   · No family history on file. Review of Systems    ROS: A 10 point review of systems was conducted, significant findings as noted in HPI. Physical Exam  Constitutional:       General: He is not in acute distress. Appearance: He is not ill-appearing, toxic-appearing or diaphoretic. Comments: Vented, sedated   HENT:      Head: Normocephalic and atraumatic. Eyes:      Extraocular Movements: Extraocular movements intact. Pupils: Pupils are equal, round, and reactive to light. Neck:      Musculoskeletal: Neck supple. No neck rigidity. Cardiovascular:      Rate and Rhythm: Normal rate and regular rhythm. Pulses: Normal pulses. Heart sounds: Normal heart sounds. Pulmonary:      Effort: No respiratory distress. Breath sounds: Normal breath sounds. No stridor. No wheezing, rhonchi or rales. Comments: vented  Abdominal:      General: Abdomen is flat. Bowel sounds are normal. There is no distension. Palpations: Abdomen is soft. Tenderness: There is no abdominal tenderness. There is no guarding. Musculoskeletal: Normal range of motion. General: Swelling present. No deformity or signs of injury. Right lower leg: Edema present. Left lower leg: Edema present. Comments: Passive range of motion normal, L > R LE edema   Skin:     General: Skin is warm and dry. Capillary Refill: Capillary refill takes less than 2 seconds. Coloration: Skin is not jaundiced or pale. Findings: No bruising, erythema, lesion or rash.    Neurological:      Comments: PERRLA, withdraws to pain in all extremities, unable to assess further due to sedated and vented      Vitals:    03/12/20 0700   BP: 105/71   Pulse: 63   Resp: 16   Temp:    SpO2:        DATA:    Labs:  CBC:   Recent Labs     03/11/20  1245 03/12/20  0422   WBC 8.1 7.3   HGB 12.6* 11.1*   HCT 38.1* 33.4* PLT 75* 113*       BMP:   Recent Labs     03/11/20  1245 03/12/20  0422    141   K 3.8 2.9*   CL 92* 100   CO2 18* 23   BUN 17 16   CREATININE 1.4* 0.9   GLUCOSE 120* 100*     LFT's:   Recent Labs     03/11/20  1245   AST 63*   ALT 27   BILITOT 1.6*   ALKPHOS 64     Troponin:   Recent Labs     03/11/20  1245 03/11/20  1706 03/11/20  2213   TROPONINI 0.06* 0.06* 0.06*     BNP:No results for input(s): BNP in the last 72 hours. ABGs:   Recent Labs     03/11/20  1750   PHART 7.472*   CWD7VMY 41.3   PO2ART 103.2     INR:   Recent Labs     03/11/20  1245 03/12/20  0422   INR 1.23* 1.18*       U/A:  Recent Labs     03/11/20  1250 03/11/20  1820   COLORU ORANGE*  --    PHUR 5.5 5.0   WBCUA 11*  --    RBCUA 19*  --    CLARITYU Clear  --    SPECGRAV >1.030  --    LEUKOCYTESUR SMALL*  --    UROBILINOGEN 1.0  --    BILIRUBINUR MODERATE*  --    BLOODU LARGE*  --    GLUCOSEU Negative  --        CT head without contrast   Final Result      1. Stable right parietal intraparenchymal hematoma. Mild adjacent edema. No new or progressive intracranial hemorrhage. XR PELVIS (1-2 VIEWS)   Final Result   1. No acute abnormality. XR FEMUR LEFT (MIN 2 VIEWS)   Final Result   1. No acute osseous abnormality. XR TIBIA FIBULA LEFT (2 VIEWS)   Final Result   1. Soft tissue swelling without acute osseous abnormality. XR FOOT LEFT (MIN 3 VIEWS)   Final Result   1. No acute osseous normality. 2. Extensive soft tissue swelling.       VL Extremity Venous Bilateral    (Results Pending)           ASSESSMENT AND PLAN:    71 yom with ICH     Acute nontraumatic right parietal intracranial hemorrhage  - neurosurgery consulted, appreciate recs  - repeat CT head stable  - goal SBP <160, hydralazine and labetalol PRN   - elevate head of bed 30 degrees  - no antiplatelets or anticoagulants  - neurochecks q1 hr  - continuous EEG    Bilateral acute and chronic lower extremity DVT  - f/u KUB to ensure patient doesn't already have IVC filter  - IR consult for IVC filter placement     Hypertensive emergency - resolved  - nicardipine gtt weaned  - SBP < 140     Acute encephalopathy  - f/u urine tox    Urinary tract infection  - Rocephin x 3 days     Acute kidney injury - resolved  - baseline creatinine normal  - monitor with fluids     Type 2 NSTEMI - resolved  - trop 0.06 x 3, will stop trending     Possible traumatic rhabdomyolysis - ruled out  - CK normal     Left leg swelling   - XR entire left leg negative for fracture  - f/u LE duplex     Chronic:  Seizure disorder - takes Keppra 500mg BID at home  Afib - AC contraindicated due to fall risk, seizure disorder, brain bleed  CAD s/p NSTEMI - continue statin, Toprol  Depression - holding trazodone and Zoloft  GERD - continue Protonix  EtOH abuse - CIWA assessment, rally pack     Code Status: Full code  FEN: NPO, NS @ 100 cc/hr  PPX: Protonix 40 IV daily, SCDs  DISPO: ICU      Will discuss with attending physician Dr. Tommy Lyon    Code Status: Full code  FEN: NPO, NS @ 75 cc/hr  PPX:   DISPO: IP    Sofía Starks MD  3/12/2020,  8:35 AM    Patient examined, findings as discussed with Dr. Karthik Wilkinson. Agree with assessment and plan as above. IVC filter to prevent complicating pulmonary emboli. After that, may proceed to evaluate ventilatory drive and ability to maintain spontaneous ventilation.   Will need to limit sedation to properly assess neurologic status  Time spent in critical care 40 minutes

## 2020-03-12 NOTE — PROGRESS NOTES
3/11/2020  1. No large vessel occlusion. No intracranial proximal large artery hemodynamically significant stenosis, occlusion or aneurysm. 2.  No hemodynamically significant stenosis of the bilateral cervical internal carotid arteries per NASCET criteria. Patent bilateral vertebral arteries. 3.  No acute cervical spine fracture. Multilevel degenerative changes in the cervical spine. 4.  Right parietal lobe acute intraparenchymal hematoma is better evaluated on noncontrast CT head done earlier same day. 5.  Ill-defined asymmetric soft tissue prominence in the left supraglottic airway is nonspecific and could be further evaluated with direct visualization as warranted. Labs:  Recent Labs     03/12/20  0422   WBC 7.3   HGB 11.1*   HCT 33.4*   *       Recent Labs     03/11/20  1706 03/12/20  0422   NA  --  141   K  --  2.9*   CL  --  100   CO2  --  23   BUN  --  16   CREATININE  --  0.9   GLUCOSE  --  100*   CALCIUM  --  7.9*   MG 1.30*  --        Recent Labs     03/12/20 0422   PROTIME 13.7*   INR 1.18*       Patient Active Problem List    Diagnosis Date Noted    [facility]ac[facility] hemorrhage ([facility] 03/11/2020    Acute respiratory failure with hypoxia and hypercapnia ([facility] 03/11/2020    Essential hypertension 03/11/2020    Acute encephalopathy     Hypokalemia     New onset seizure ([facility] 02/12/2020    GERD (gastroesophageal reflux disease) 12/05/2019    HTN (hypertension), benign 12/05/2019    Depression 12/05/2019    Elevated d-dimer 12/05/2019    NSTEMI (non-ST elevated myocardial infarction) ([facility] 12/04/2019     Assessment:  Patient is a 71 y.o. male w/seizure activity and acute intraparenchymal hematoma in the high right parietal region.     Plan:  1. Neurologic exams frequency: Q1H until otherwise directed  2. For change in exam MUST contact neurosurgery team along with critical care or primary team  3. IPH:  - Repeat CT Head stable  - MRI Brain w wo when stable  - Maintain SBP <160;  If PRN

## 2020-03-12 NOTE — PROGRESS NOTES
[facility] contacted  [phone]  via team viewer to place patient in transit for IR test. Approximate time of 2hrs given for patient to return.

## 2020-03-12 NOTE — PROGRESS NOTES
Attempted to reach patient spouse multiple times this afternoon to receive consent for IVC and MRI checklist. Voicemail has been left with no return call.

## 2020-03-12 NOTE — CONSULTS
Neurology consult Note    Dr. Alin Ramirez requesting this consult. CC: Seizures    HPI:   Mr. Elizabeth Mckeon is a 71year old man with PMH afib, Cardiac arrest, remote alcoholism who presented with altered mental status. He was found down on the floor of his assisted living center and found by the  of his assisted living facility. On arrival to ED, he had NIHSS of 11 for left sided weakness and sensory change. He had two episodes concerning for seizure but the semiology is not described. They walked into the room and found him cyanotic and so he was intubated with RSI. He was recently discharged from [facility] after he was admitted with altered mental status. He presented with a fall and was given narcan in the field. There was some subjective evidence that he had bitten his tongue. He had an elevated lactic acid and so the concern shifted to seizure. Past Medical History:   Diagnosis Date    Alcoholism [facility])     Cardiac arrest ([facility] 08/2016    Depression     PAF ([facility]oxysmal [facility] fibrillation) ([facility])      No past surgical history on file.     CURRENT MEDICATIONS:    Current Facility-Administered Medications:     potassium chloride 10 mEq/100 mL IVPB (Peripheral Line), 10 mEq, Intravenous, Q1H, Rosalia Keep, DO, Last Rate: 100 mL/hr at 03/12/20 0949, 10 mEq at 03/12/20 0949    0.9 % sodium chloride infusion, , Intravenous, Continuous, Sera Timmons MD, Last Rate: 75 mL/hr at 03/12/20 0919    labetalol (NORMODYNE;TRANDATE) injection syringe 10 mg, 10 mg, Intravenous, Q4H PRN, Sera Timmons MD    hydrALAZINE (APRESOLINE) injection 10 mg, 10 mg, Intravenous, Q4H PRN, Sera Timmons MD    niCARdipine (CARDENE) 25 mg in sodium chloride 0.9 % 250 mL infusion, 5 mg/hr, Intravenous, Continuous, HEIDI Kuo - CNP, Stopped at 03/11/20 1722    sodium chloride flush 0.9 % injection 10 mL, 10 mL, Intravenous, 2 times per day, Jian Graf MD, 10 mL at 03/12/20 0919    sodium chloride flush 0.9 % injection 10 mL, 10 mL, Intravenous, PRN, Ysabel Dowling MD    promethazine (PHENERGAN) tablet 12.5 mg, 12.5 mg, Oral, Q6H PRN **OR** ondansetron (ZOFRAN) injection 4 mg, 4 mg, Intravenous, Q6H PRN, Ysabel Dowling MD    propofol injection, 10 mcg/kg/min, Intravenous, Titrated, Ysabel Dowling MD, Last Rate: 18.3 mL/hr at 03/12/20 0908, 40 mcg/kg/min at 03/12/20 0908    levETIRAcetam (KEPPRA) 1,000 mg in sodium chloride 0.9 % 100 mL IVPB, 1,000 mg, Intravenous, Q12H, HEIDI Brennan CNP, Last Rate: 0 mL/hr at 03/11/20 2119, 1,000 mg at 03/12/20 0919    sodium chloride flush 0.9 % injection 10 mL, 10 mL, Intravenous, 2 times per day, Ysabel Dowling MD, 10 mL at 03/12/20 0919    sodium chloride flush 0.9 % injection 10 mL, 10 mL, Intravenous, PRN, Ysabel Dowling MD    cefTRIAXone (ROCEPHIN) 1 g IVPB in 50 mL D5W minibag, 1 g, Intravenous, Q24H, Shaista Funk MD, Stopped at 03/11/20 2119      ROS:   RADHA    Constitutional  /83   Pulse 62   Temp 98.8 °F (37.1 °C) (Axillary)   Resp 16   Ht 5' 9\" (1.753 m)   Wt 168 lb 6.9 oz (76.4 kg)   SpO2 99%   BMI 24.87 kg/m²       Constitutional    Vital signs: BP, HR, and RR reviewed   General depressed mental status, no distress, well-nourished  Eyes: fundoscopic exam difficult given inability to cooperate  Cardiovascular: pulses symmetric in all 4 extremities. No peripheral edema. Psychiatric: limited exam given encephalopathy but not agitated and no signs of hallucinations  Neurologic  Mental status: Does not open eyes to verbal, tactile, or painful stim.    CN2: Visual Fields: no blink to either side with threat  CN 3,4,6:  extraocular muscles intact with dolls maneuver  CN5: facial sensation limited exam given encephalopathy  CN7:face symmetric but exam limited by ET tube  CN8: hearing limited exam given encephalopathy  CN9: limited exam given encephalopathy  CN11: limited exam given encephalopathy  CN12: limited exam given encephalopathy  Strength:   Purposeful movement to LUE  Flexion to painful stim in RUE  Withdrawal to painful stim in BLE  Deep tendon reflexes:   Increased in RUE and RLE  Decreased in LLE  Downgoing toes   Sensory: grimace to pain in all 4 extremities. Cerebellar/coordination:limited exam given encephalopathy  Tone: normal in all 4 extremities  Gait: limited exam given encephalopathy and intubated with ventilator. Images:  CT head wo contrast:   1. Acute intraparenchymal hematoma in the high right parietal region has 2 main nodular components the larger measuring up to 2.1 cm. Mild surrounding vasogenic edema without significant mass effect or midline shift. 2.  Mild chronic small vessel ischemic disease. CTA head and neck:   1. No large vessel occlusion. No intracranial proximal large artery hemodynamically significant stenosis, occlusion or aneurysm. 2.  No hemodynamically significant stenosis of the bilateral cervical internal carotid arteries per NASCET criteria. Patent bilateral vertebral arteries. 3.  No acute cervical spine fracture. Multilevel degenerative changes in the cervical spine. 4.  Right parietal lobe acute intraparenchymal hematoma is better evaluated on noncontrast CT head done earlier same day. 5.  Ill-defined asymmetric soft tissue prominence in the left supraglottic airway is nonspecific and could be further evaluated with direct visualization as warranted. MRI wo contrast, 2/14/2020:  1. No acute intracranial abnormality. Specifically, no acute infarction or mesial temporal sclerosis. 2. Parenchymal volume loss and mild-to-moderate chronic microvascular ischemic changes. BLE dopplers:   There appears to be evidence of an acute deep venous thrombosis involving the right gastrocnemius and soleal veins, a subacute on chronic (aging acute) deep vein thrombosis in the posterior tibial veins, and a chronic thrombotic process noted in the right popliteal vein. There does not appear to be any other evidence of deep or superficial venous thrombosis involving the right lower extremity. Calf veins were not well visualized on the right. Cannot rule out isolated calf thrombi. Incidental finding of pulsatile venous flow most commonly associated with fluid volume overload. Left There is evidence of an acute partially occluding deep venous thrombosis involving the left popliteal vein and an acute totally occluding deep venous thrombosis involving the left posterior tibial and soleal veins. There does not appear to be any other evidence of deep or superficial venous thrombosis involving the left lower extremity. Calf veins were not well visualized on the left. Cannot rule out isolated calf thrombi. Incidental finding of pulsatile venous flow most commonly associated with fluid volume overload. Assessment: 71year old man with PMH afib, cardiac arrest, alcoholism who presented with altered mental status and was found to have a high right parietal ICH of unknown etiology.        Plan:  -Continue cvEEG for another 24 hours, at least  -Keppra 1000mg IV BID  -MRI brain wo contrast  -BP control with goal SBP <160  -Due to extensive clotting, may consider CT of chest, abdomen, and pelvis to look for underlying malignancy      Shelby Kanner, APRN-CNP  Neurology  [phone]

## 2020-03-13 ENCOUNTER — APPOINTMENT (OUTPATIENT)
Dept: INTERVENTIONAL RADIOLOGY/VASCULAR | Age: 70
DRG: 957 | End: 2020-03-13
Attending: INTERNAL MEDICINE
Payer: MEDICARE

## 2020-03-13 ENCOUNTER — APPOINTMENT (OUTPATIENT)
Dept: CT IMAGING | Age: 70
DRG: 957 | End: 2020-03-13
Attending: INTERNAL MEDICINE
Payer: MEDICARE

## 2020-03-13 LAB
ANION GAP SERPL CALCULATED.3IONS-SCNC: 14 MMOL/L (ref 3–16)
BASOPHILS ABSOLUTE: 0 K/UL (ref 0–0.2)
BASOPHILS RELATIVE PERCENT: 0.3 %
BUN BLDV-MCNC: 13 MG/DL (ref 7–20)
CALCIUM SERPL-MCNC: 7.8 MG/DL (ref 8.3–10.6)
CHLORIDE BLD-SCNC: 103 MMOL/L (ref 99–110)
CO2: 21 MMOL/L (ref 21–32)
CREAT SERPL-MCNC: 0.7 MG/DL (ref 0.8–1.3)
EOSINOPHILS ABSOLUTE: 0 K/UL (ref 0–0.6)
EOSINOPHILS RELATIVE PERCENT: 0.5 %
ESTIMATED AVERAGE GLUCOSE: 111.2 MG/DL
GFR AFRICAN AMERICAN: >60
GFR NON-AFRICAN AMERICAN: >60
GLUCOSE BLD-MCNC: 96 MG/DL (ref 70–99)
HBA1C MFR BLD: 5.5 %
HCT VFR BLD CALC: 34.2 % (ref 40.5–52.5)
HEMOGLOBIN: 11.3 G/DL (ref 13.5–17.5)
LYMPHOCYTES ABSOLUTE: 0.5 K/UL (ref 1–5.1)
LYMPHOCYTES RELATIVE PERCENT: 6.9 %
MCH RBC QN AUTO: 32.6 PG (ref 26–34)
MCHC RBC AUTO-ENTMCNC: 33.1 G/DL (ref 31–36)
MCV RBC AUTO: 98.3 FL (ref 80–100)
MONOCYTES ABSOLUTE: 0.6 K/UL (ref 0–1.3)
MONOCYTES RELATIVE PERCENT: 8.6 %
NEUTROPHILS ABSOLUTE: 5.8 K/UL (ref 1.7–7.7)
NEUTROPHILS RELATIVE PERCENT: 83.7 %
PDW BLD-RTO: 18.1 % (ref 12.4–15.4)
PLATELET # BLD: 97 K/UL (ref 135–450)
PMV BLD AUTO: 8.7 FL (ref 5–10.5)
POTASSIUM SERPL-SCNC: 3.5 MMOL/L (ref 3.5–5.1)
RBC # BLD: 3.48 M/UL (ref 4.2–5.9)
SODIUM BLD-SCNC: 138 MMOL/L (ref 136–145)
WBC # BLD: 6.9 K/UL (ref 4–11)

## 2020-03-13 PROCEDURE — 2000000000 HC ICU R&B

## 2020-03-13 PROCEDURE — C9113 INJ PANTOPRAZOLE SODIUM, VIA: HCPCS | Performed by: STUDENT IN AN ORGANIZED HEALTH CARE EDUCATION/TRAINING PROGRAM

## 2020-03-13 PROCEDURE — 94003 VENT MGMT INPAT SUBQ DAY: CPT

## 2020-03-13 PROCEDURE — 2700000000 HC OXYGEN THERAPY PER DAY

## 2020-03-13 PROCEDURE — 36415 COLL VENOUS BLD VENIPUNCTURE: CPT

## 2020-03-13 PROCEDURE — 94770 HC ETCO2 MONITOR DAILY: CPT

## 2020-03-13 PROCEDURE — 2500000003 HC RX 250 WO HCPCS

## 2020-03-13 PROCEDURE — 6370000000 HC RX 637 (ALT 250 FOR IP): Performed by: INTERNAL MEDICINE

## 2020-03-13 PROCEDURE — 2580000003 HC RX 258: Performed by: STUDENT IN AN ORGANIZED HEALTH CARE EDUCATION/TRAINING PROGRAM

## 2020-03-13 PROCEDURE — C1894 INTRO/SHEATH, NON-LASER: HCPCS

## 2020-03-13 PROCEDURE — 37191 INS ENDOVAS VENA CAVA FILTR: CPT

## 2020-03-13 PROCEDURE — C1887 CATHETER, GUIDING: HCPCS

## 2020-03-13 PROCEDURE — 99291 CRITICAL CARE FIRST HOUR: CPT | Performed by: INTERNAL MEDICINE

## 2020-03-13 PROCEDURE — 6360000004 HC RX CONTRAST MEDICATION: Performed by: NURSE PRACTITIONER

## 2020-03-13 PROCEDURE — 94750 HC PULMONARY COMPLIANCE STUDY: CPT

## 2020-03-13 PROCEDURE — 94761 N-INVAS EAR/PLS OXIMETRY MLT: CPT

## 2020-03-13 PROCEDURE — C1880 VENA CAVA FILTER: HCPCS

## 2020-03-13 PROCEDURE — 6360000002 HC RX W HCPCS: Performed by: STUDENT IN AN ORGANIZED HEALTH CARE EDUCATION/TRAINING PROGRAM

## 2020-03-13 PROCEDURE — C1769 GUIDE WIRE: HCPCS

## 2020-03-13 PROCEDURE — 06H03DZ INSERTION OF INTRALUMINAL DEVICE INTO INFERIOR VENA CAVA, PERCUTANEOUS APPROACH: ICD-10-PCS | Performed by: RADIOLOGY

## 2020-03-13 PROCEDURE — 70496 CT ANGIOGRAPHY HEAD: CPT

## 2020-03-13 PROCEDURE — 85025 COMPLETE CBC W/AUTO DIFF WBC: CPT

## 2020-03-13 PROCEDURE — 2580000003 HC RX 258: Performed by: NURSE PRACTITIONER

## 2020-03-13 PROCEDURE — 6360000002 HC RX W HCPCS: Performed by: NURSE PRACTITIONER

## 2020-03-13 PROCEDURE — 6360000002 HC RX W HCPCS

## 2020-03-13 PROCEDURE — 2580000003 HC RX 258: Performed by: SURGERY

## 2020-03-13 PROCEDURE — 80048 BASIC METABOLIC PNL TOTAL CA: CPT

## 2020-03-13 PROCEDURE — 6360000004 HC RX CONTRAST MEDICATION: Performed by: RADIOLOGY

## 2020-03-13 PROCEDURE — 6360000002 HC RX W HCPCS: Performed by: SURGERY

## 2020-03-13 PROCEDURE — 95813 EEG EXTND MNTR 61-119 MIN: CPT

## 2020-03-13 RX ORDER — POTASSIUM CHLORIDE 7.45 MG/ML
10 INJECTION INTRAVENOUS
Status: COMPLETED | OUTPATIENT
Start: 2020-03-13 | End: 2020-03-13

## 2020-03-13 RX ORDER — CHLORHEXIDINE GLUCONATE 0.12 MG/ML
15 RINSE ORAL 2 TIMES DAILY
Status: DISCONTINUED | OUTPATIENT
Start: 2020-03-13 | End: 2020-03-18 | Stop reason: HOSPADM

## 2020-03-13 RX ORDER — POTASSIUM CHLORIDE 7.45 MG/ML
10 INJECTION INTRAVENOUS
Status: DISPENSED | OUTPATIENT
Start: 2020-03-13 | End: 2020-03-13

## 2020-03-13 RX ADMIN — PANTOPRAZOLE SODIUM 40 MG: 40 INJECTION, POWDER, FOR SOLUTION INTRAVENOUS at 08:30

## 2020-03-13 RX ADMIN — IOHEXOL 50 ML: 350 INJECTION, SOLUTION INTRAVENOUS at 15:44

## 2020-03-13 RX ADMIN — Medication 10 ML: at 22:24

## 2020-03-13 RX ADMIN — HYDRALAZINE HYDROCHLORIDE 10 MG: 20 INJECTION INTRAMUSCULAR; INTRAVENOUS at 04:49

## 2020-03-13 RX ADMIN — Medication 15 ML: at 21:17

## 2020-03-13 RX ADMIN — POTASSIUM CHLORIDE 10 MEQ: 7.46 INJECTION, SOLUTION INTRAVENOUS at 15:47

## 2020-03-13 RX ADMIN — Medication 10 ML: at 08:31

## 2020-03-13 RX ADMIN — PROPOFOL 50 MCG/KG/MIN: 10 INJECTION, EMULSION INTRAVENOUS at 05:40

## 2020-03-13 RX ADMIN — SODIUM CHLORIDE: 9 INJECTION, SOLUTION INTRAVENOUS at 09:29

## 2020-03-13 RX ADMIN — PROPOFOL 50 MCG/KG/MIN: 10 INJECTION, EMULSION INTRAVENOUS at 01:31

## 2020-03-13 RX ADMIN — PROPOFOL 50 MCG/KG/MIN: 10 INJECTION, EMULSION INTRAVENOUS at 09:27

## 2020-03-13 RX ADMIN — SODIUM CHLORIDE: 9 INJECTION, SOLUTION INTRAVENOUS at 12:31

## 2020-03-13 RX ADMIN — LEVETIRACETAM 1000 MG: 100 INJECTION, SOLUTION INTRAVENOUS at 21:42

## 2020-03-13 RX ADMIN — Medication 15 ML: at 07:53

## 2020-03-13 RX ADMIN — POTASSIUM CHLORIDE 10 MEQ: 7.46 INJECTION, SOLUTION INTRAVENOUS at 14:47

## 2020-03-13 RX ADMIN — IOPAMIDOL 80 ML: 755 INJECTION, SOLUTION INTRAVENOUS at 18:57

## 2020-03-13 RX ADMIN — PROPOFOL 50 MCG/KG/MIN: 10 INJECTION, EMULSION INTRAVENOUS at 14:00

## 2020-03-13 RX ADMIN — PROPOFOL 40 MCG/KG/MIN: 10 INJECTION, EMULSION INTRAVENOUS at 20:46

## 2020-03-13 RX ADMIN — PROPOFOL 50 MCG/KG/MIN: 10 INJECTION, EMULSION INTRAVENOUS at 16:44

## 2020-03-13 RX ADMIN — LEVETIRACETAM 1000 MG: 100 INJECTION, SOLUTION INTRAVENOUS at 08:30

## 2020-03-13 RX ADMIN — IOPAMIDOL 20 ML: 755 INJECTION, SOLUTION INTRAVENOUS at 18:57

## 2020-03-13 ASSESSMENT — PAIN SCALES - GENERAL
PAINLEVEL_OUTOF10: 0

## 2020-03-13 ASSESSMENT — PULMONARY FUNCTION TESTS
PIF_VALUE: 11
PIF_VALUE: 19
PIF_VALUE: 17
PIF_VALUE: 15
PIF_VALUE: 16
PIF_VALUE: 19
PIF_VALUE: 18
PIF_VALUE: 19
PIF_VALUE: 16
PIF_VALUE: 17
PIF_VALUE: 21
PIF_VALUE: 16

## 2020-03-13 NOTE — PROGRESS NOTES
CONTINUOUS EEG    Name:  Todd Oneill Record Number:  [id number]  Age: 71 y.o. Gender: male  : 1950  Today's Date:  3/13/2020  Room:  9826/7308-66  Vital Signs   BP (!) 142/82   Pulse 74   Temp 98.2 °F (36.8 °C) (Axillary)   Resp 18   Ht 5' 9\" (1.753 m)   Wt 168 lb 6.9 oz (76.4 kg)   SpO2 100%   BMI 24.87 kg/m²       Patient currently on continuous EEG monitoring. All EEG leads are currently in place with no current issues. Verified [employer] connection via team viewer. Checked in with patient RN for current plan of care. Comments: Continue monitoring. All leads within 10K limit.     Electronically signed by Sanchez Euceda on 3/13/2020 at 10:03 AM

## 2020-03-13 NOTE — PLAN OF CARE
Problem: Nutrition  Goal: Optimal nutrition therapy  Outcome: Ongoing   Nutrition Problem: Inadequate Oral Intake   Intervention: Start Tube Feeding  if pt remains intubated >24 hrs  Nutrition Goals: Pt will tolerate EN to provide 100% of needs until extubated and diet is adv

## 2020-03-13 NOTE — PROGRESS NOTES
swelling without acute osseous abnormality. XR FOOT LEFT (MIN 3 VIEWS)   Final Result   1. No acute osseous normality. 2. Extensive soft tissue swelling. MRI BRAIN WO CONTRAST    (Results Pending)           Assessment/Plan:    Active Hospital Problems    Diagnosis Date Noted    Intracranial hemorrhage ([address] [I62.9] 03/11/2020    Acute respiratory failure with hypoxia and hypercapnia (HCC) [J96.01, J96.02] 03/11/2020    Essential hypertension [I10] 03/11/2020    New onset seizure ([address] [R56.9] 02/12/2020     Acute traumatic right parietal intraparenchymal hematoma with mild vasogenic edema:   Continue neuro checks. Maintain systolic blood pressure less than 160. Hold AC and antiplatelets  Repeat CT head stable. Follow neurosurgery recommendation. Seizures:   Patient on ventilator for airway support for airway protection. Vent management per pulmonology. Continue Keppra and cvEEG  MRI brain and CTV pending  Follow neurology recs    Hypertensive emergency: Resolved  Off nicardipine drip. BP stable now. Maintain systolic blood pressure <540    Acute metabolic encephalopathy:   Secondary to above. Continue management as above. Follow up on tox screen    Possible NSTEMI:   Might be secondary to decreased renal clearance. EKG negative for acute ST segment changes. Monitor troponin, stable    Acute kidney injury: Resolved  Likely prerenal. CK negative. Continue IV fluids    Possible UTI: Ruled out  UA positive for LE and nitrite. Cx with NG  IV ceftriaxone discontinued    Bilaterlal acute and chronic LE DVT:   Dopplers positive for acute and chronic DVT  XR KUB with no evidence of previous IVC filter  IR consulted for IVC filter placement    History of A.  Fib:   Anticoagulation contraindicated due to fall risk, seizure disorder and recent brain bleed    CAD status post and STEMI:   Continue statin and Toprol    Alcohol abuse:   Cont CIWA protocol    Hypomagnesemia/Hypokalemia:  Monitor and replace as needed    DVT Prophylaxis: SCD's  Diet: Diet NPO Effective Now  Code Status: Full Code    PT/OT Eval Status: Once stable    Jet Roberson MD

## 2020-03-13 NOTE — PROCEDURES
Assuming that the omeprazole 20 mg BID resolved his pain, then yes, we can keep him on it if his pain has returned.  We were treating erosive gastritis changes noted at his 12/18 EGD -- at the time, he had epigastric pain.  It may be that he has chronic acidic issues now that will demand long-term use of the PPI.    Omeprazole 20 mg BID  -- Disp: 180  -- Refills: 1    If the pain did not resolve with the PPI, then he will need a CT scan to assess his pancreas:  -- CT abdomen/pelvis with IV contrast (Dx: Epigastric pain).  Again, the CT would only be needed if the PPI did not take care of the pain while he was taking it.    Please also review the \"Result Note\" from his 12/18 EGD/colonoscopy with him.  It seems we tried to get in touch with him but never actually touched base with him.  He needs an EUS of a duodenal nodule.       Continuous EEG monitoring record    Patient name: Kashif Brush    START: 03/12/2020 @ 08:00am  END: 03/13/2020 @ 08:00am      Electroencephalographer: Isa Matt MD PhD      CLINICAL DETAILS:  This EEG was performed on this 71 y. o.yo male admitted for Altered mental Status and concer for subclinical seizures      TECHNICAL DETAILS:  Continuous video-EEG monitoring was performed with 27 surface scalp electrodes placed according to the International 10-20 electrode placement system, using a 32-channel BlueYield headbox. All EEG and video information was acquired digitally, including the use of automated spike and seizure detection software to detect epileptiform activity. An event button was also available to be depressed during clinical events. 03/13/2020 00:00am to 08:00am  SEIZURES:  None  INTERICTAL:  None  PUSHBUTTONS:  None  BACKGROUND:  Abundant generalized alpha/beta frequencies   Frequent brief runs of generalized rhythmic 1.5-2 Hz delta activity   EKG:  regular      03/12/2020 08:00am to 23:59pm  SEIZURES:  None  INTERICTAL:  None  PUSHBUTTONS:  None  BACKGROUND:  Abundant generalized alpha/beta frequencies   EKG:  regular    CLINICAL INTERPRETATION:  This was an abnormal tracing for age and state due to a mild to moderate generalized slow wave abnormality indicating diffuse cerebral dysfunction which can be of multiple causes, including structural, or vascular abnormalities, toxic/metabolic conditions, hydrocephalus, or postictal conditions. No epileptiform discharge, focal slowing, or seizures were seen during this recording. Clinical correlation is advised.         Isa Matt MD PhD

## 2020-03-13 NOTE — PROGRESS NOTES
Neurology Follow Up  Note    Updates:  Seen for seizure. He remains intubated on high dose propofol and CVEEG. ROS:  Unable to obtain as he is intubated and sedated. Exam:  Blood pressure 124/87, pulse 74, temperature 98.2 °F (36.8 °C), temperature source Axillary, resp. rate 15, height 5' 9\" (1.753 m), weight 168 lb 6.9 oz (76.4 kg), SpO2 100 %. Constitutional    Vital signs: BP, HR, and RR reviewed   General Sedated on mechanical ventilation , well-nourished  Eyes: fundoscopic exam not visualized. .   Cardiovascular: pulses symmetric in all 4 extremities. No peripheral edema. Psychiatric: does not cooperate with examination, no  psychotic behavior noted. Neurologic  Mental status: Limited examination as the patient remains intubated on mechanical ventilation. orientation limited examination due to encephalopathy. General fund of knowledge  limited examination due to encephalopathy. Memory  limited examination due to encephalopathy. Attention  limited examination due to encephalopathy. Language  limited examination due to encephalopathy. Comprehension  limited examination due to encephalopathy. Cranial nerves:   CN2:  limited examination due to encephalopathy. CN 3,4,6:  limited examination due to encephalopathy. PERRL no gaze deviation. CN5:  limited examination due to encephalopathy. CN7:  ETT and bennett in place unable to evaluate. CN8: hearing grossly intact  CN9:  limited examination due to encephalopathy. CN11:  limited examination due to encephalopathy. CN12: limited examination due to encephalopathy. Strength: Moving left  arm and legs spontaneously. Unable to test strength. Deep tendon reflexes: normal in all extremities. Sensory: withdraws to tactile stimulation minimally. Cerebellar/coordination:  limited examination due to encephalopathy.   Tone: normal in all 4 extremities  Gait: Deferred at this time due to safety concerns. Labs:    CBC:   Lab Results   Component Value Date    WBC 6.9 2020    RBC 3.48 2020    HGB 11.3 2020    HCT 34.2 2020    MCV 98.3 2020    MCH 32.6 2020    MCHC 33.1 2020    RDW 18.1 2020    PLT 97 2020    MPV 8.7 2020     BMP:    Lab Results   Component Value Date     2020    K 3.5 2020    K 3.8 2020     2020    CO2 21 2020    BUN 13 2020    LABALBU 4.1 2020    CREATININE 0.7 2020    CALCIUM 7.8 2020    GFRAA >60 2020    LABGLOM >60 2020    GLUCOSE 96 2020       Hepatic:   Recent Labs     20  1245   AST 63*   ALT 27   BILITOT 1.6*   ALKPHOS 64     Lipids:   Recent Labs     20  0422   CHOL 107   TRIG 76   HDL 34*   LDLCALC 58   LABVLDL 15     INR:   Recent Labs     20  1245 20  0422   INR 1.23* 1.18*     HGBA1c:  Recent Labs     20  0422   LABA1C 5.5       Studies:  XR ABDOMEN (KUB) (SINGLE AP VIEW)   Final Result      Frontal view demonstrates an unremarkable bowel gas pattern. Catheter projects over the pelvis. No IVC filter is visualized. VL Extremity Venous Bilateral   Final Result      CT head without contrast   Final Result      1. Stable right parietal intraparenchymal hematoma. Mild adjacent edema. No new or progressive intracranial hemorrhage. XR PELVIS (1-2 VIEWS)   Final Result   1. No acute abnormality. XR FEMUR LEFT (MIN 2 VIEWS)   Final Result   1. No acute osseous abnormality. XR TIBIA FIBULA LEFT (2 VIEWS)   Final Result   1. Soft tissue swelling without acute osseous abnormality. XR FOOT LEFT (MIN 3 VIEWS)   Final Result   1. No acute osseous normality. 2. Extensive soft tissue swelling.       MRI BRAIN WO CONTRAST    (Results Pending)         CVEE2020 00:00am to 08:00am  SEIZURES:  None  INTERICTAL:  None  PUSHBUTTONS:  None  BACKGROUND:  Abundant generalized alpha/beta frequencies   Frequent brief runs of generalized rhythmic 1.5-2 Hz delta activity   EKG:  regular        03/12/2020 08:00am to 23:59pm  SEIZURES:  None  INTERICTAL:  None  PUSHBUTTONS:  None  BACKGROUND:  Abundant generalized alpha/beta frequencies   EKG:  regular     CLINICAL INTERPRETATION:  This was an abnormal tracing for age and state due to a mild to moderate generalized slow wave abnormality indicating diffuse cerebral dysfunction which can be of multiple causes, including structural, or vascular abnormalities, toxic/metabolic conditions, hydrocephalus, or postictal conditions. No epileptiform discharge, focal slowing, or seizures were seen during this recording. Clinical correlation is advised.            Scheduled Meds:   chlorhexidine  15 mL Mouth/Throat BID    potassium chloride  10 mEq Intravenous Q1H    pantoprazole  40 mg Intravenous Daily    cefTRIAXone (ROCEPHIN) IV  1 g Intravenous Q24H    sodium chloride flush  10 mL Intravenous 2 times per day    levetiracetam  1,000 mg Intravenous Q12H    sodium chloride flush  10 mL Intravenous 2 times per day     ASSESSMENT:   72 yo male  with afib admitted when found down with suspected seizure in ED (forced gaze deviation). Head CT showed right parietal ICH. Significant acute and chronic DVTs noted. CvEEG has been okay. Possible cortical vein thrombosis?     RECOMMENDATIONS:   -MRI brain pending   -CTV today.   -CvEEG for another day. -Continue Keppra 1000 mg BID   -Given clotting malignancy work up may be indicated. Will defer to Primary Medical Team.   -Seizure precautions  -Neurosurgery Consultant following .  -Hold anticoagulation & antiplatelet as per Neurosurgery recommendations.    -BP control goal <160/90      A copy of this note was provided for MD Imelda Packer, APRN-[address] Neuroscience  [phone]  Evenings, weekends, and off weeks please discuss with the covering neurologist.

## 2020-03-13 NOTE — PROGRESS NOTES
03/13/20 0104 -- -- -- -- 20 99 %   03/13/20 0100 129/86 -- -- 69 16 --   03/13/20 0045 -- -- -- 68 16 99 %   03/13/20 0000 132/79 -- -- 70 16 --       Intake/Output Summary (Last 24 hours) at 3/13/2020 0751  Last data filed at 3/13/2020 0400  Gross per 24 hour   Intake 1865 ml   Output 665 ml   Net 1200 ml       Review of Systems  Unable to assess  Physical Exam  Constitutional:       General: He is not in acute distress.     Appearance: He is not ill-appearing, toxic-appearing or diaphoretic.      Comments: Vented, sedated   HENT:      Head: Normocephalic and atraumatic. Eyes:      Extraocular Movements: Extraocular movements intact.      Pupils: Pupils are equal, round, and reactive to light. Neck:      Musculoskeletal: Neck supple. No neck rigidity. Cardiovascular:      Rate and Rhythm: Normal rate and regular rhythm.      Pulses: Normal pulses.      Heart sounds: Normal heart sounds. Pulmonary:      Effort: No respiratory distress.      Breath sounds: Normal breath sounds. No stridor. No wheezing, rhonchi or rales.      Comments: vented  Abdominal:      General: Abdomen is flat. Bowel sounds are normal. There is no distension.      Palpations: Abdomen is soft.      Tenderness: There is no abdominal tenderness. There is no guarding. Musculoskeletal: Normal range of motion.         General: Swelling present. No deformity or signs of injury.      Right lower leg: Edema present.      Left lower leg: Edema present.      Comments: Passive range of motion normal, L > R LE edema   Skin:     General: Skin is warm and dry.      Capillary Refill: Capillary refill takes less than 2 seconds.      Coloration: Skin is not jaundiced or pale.      Findings: No bruising, erythema, lesion or rash.    Neurological:      Comments: PERRLA, withdraws to pain in all extremities, unable to assess further due to sedated and vented   LABS:    CBC:   Recent Labs     03/11/20  1245 03/12/20  0422 03/13/20  0415   WBC 8.1 7.3 6.9 TIBIA FIBULA LEFT (2 VIEWS)   Final Result   1. Soft tissue swelling without acute osseous abnormality. XR FOOT LEFT (MIN 3 VIEWS)   Final Result   1. No acute osseous normality. 2. Extensive soft tissue swelling. MRI BRAIN WO CONTRAST    (Results Pending)       Assessment/Plan:   71 yom with ICH     Acute nontraumatic right parietal intracranial hemorrhage with mild vasogenic edema  - neurosurgery consulted, appreciate recs  - repeat CT head stable  - goal SBP <160, hydralazine and labetalol PRN   - elevate head of bed 30 degrees  - no antiplatelets or anticoagulants  - neurochecks q1 hr  - continuous EEG     Bilateral acute and chronic lower extremity DVT  - f/u KUB to ensure patient doesn't already have IVC filter  - IR consult for IVC filter placement     Hypertensive emergency - resolved  - nicardipine gtt weaned  - SBP < 140     Acute encephalopathy  - f/u urine tox     Urinary tract infection  - Rocephin x 3 days     Acute kidney injury - resolved  - baseline creatinine normal  - monitor with fluids     Type 2 NSTEMI - resolved  - trop 0.06 x 3, will stop trending     Chronic:  Seizure disorder - takes Keppra 500mg BID at home  Afib - AC contraindicated due to fall risk, seizure disorder, brain bleed  CAD s/p NSTEMI - continue statin, Toprol  Depression - holding trazodone and Zoloft  GERD - continue Protonix  EtOH abuse - WA assessment, rally pack     Code Status: Full code  FEN: NPO, NS @ 100 cc/hr  PPX: Protonix 40 IV daily, SCDs  Jb Cerna MD, PGY-1  03/13/20  7:51 AM    This patient will be staffed and discussed with Dr. Ainsley Morel  Patient examined, findings as discussed with Dr. Chris Stern. Agree with assessment and plan as above. IVC filter placed to prevent recurrence of pulmonary embolism. Respiratory failure compensated with modest level of ventilator support.   Anticipate withdrawal of sedative medication and liberation from ventilator support in next 24 hours  Time spent in critical care 45 minutes

## 2020-03-13 NOTE — PROGRESS NOTES
Pt to CT and back with RN and ICU RN at bedside, no adverse events.     Electronically signed by Anel Del Cid RN on 3/13/2020 at 7:44 PM

## 2020-03-13 NOTE — PROGRESS NOTES
When sedation was paused, pt was able to follow RN's commands to move arms or legs. Pt then started to become agitated/reaching toward ETT and sedation was restarted. Clear/diminished lung sounds noted bilaterally with active bowel sounds. Attempted contact to pt's ex-wife Teja Mckinney for assistance getting into contact with POA concerning MRI screening and IVC filter placement. Teja Mckinney did not answer and has not called back at this time, will try again. Pt appears to be resting comfortably in bed, propofol continuously infusing and continuous EEG in place. Will continue to closely monitor.     Electronically signed by Sesar Crowley RN on 3/13/2020 at 2:19 PM

## 2020-03-13 NOTE — PLAN OF CARE
INFERIOR VENA CAVOGRAM.  INFERIOR VENA CAVA FILTER INSERTION - Option Recoverable. Ultrasound Guided Right Internal Jugular  Venous Access:    INDICATIONS: Intracranial hemorrhage . Procedure was performed by Dr. Tony Camp    Risks and benefits were discussed informed consent obtained. CONSCIOUS SEDATION: None    STERILE PREP: Full maximum sterile field/barrier technique was followed (with cap and mask, gloves and sterile gown, as well as broad field sterile drapes). Local disinfection was performed with broad field application of orange dyed chloraprep solution, which was allowed to dry fully prior to the initiation of the procedure. Maintain sterile field at all times. Ultrasound probe cleansing with sterile gel and probe covers were used. Medications labeled    Preliminary ultrasound demonstrates patency of the right internal jugular vein. Coaptation of the vessel wall with no color-flow abnormalities. PROCEDURE: The right neck was prepped and draped in usual fashion. The sterile technique and  following 2% lidocaine local anesthetic 21 gauge micropuncture needle was advanced into the internal jugular vein utilizing US guidance. Hard copy confirmation confirmed intraluminal placement. Venous blood return was observed. 0.18 guidewire was advanced to the superior vena cava. Catheter tract was dilated with placement of a 6 [place] introducer sheath. Subsequently, Option inferior vena cava filter was introduced in satisfactory position and alignment. FINDINGS:  Inferior vena cava is widely patent. Renal veins are patent. Post procedure venography was not performed  Postprocedure imaging of the filter demonstrate satisfactory alignment in satisfactory position with its tip at the level of the renal veins. COMPLICATIONS: None. ESTIMATED BLOOD LOSS: 0.5 mL  Fluoroscopy time: 4.0 minute  Number of images: 5 fluoroscopic  Number ultrasound images: 1    IMPRESSION:  1.  Widely patent vena cava

## 2020-03-13 NOTE — PROGRESS NOTES
Attempted to get a hold of family 2x today, no answer or call back. Consent per physicians in chart for IVC filter placement today.     Attempted contact with CT, no answer at this time for CTA completion, will reattempt soon    Electronically signed by Bhumi Duarte RN on 3/13/2020 at 2:45 PM

## 2020-03-13 NOTE — PROGRESS NOTES
function-inability to consume food  Signs & Symptoms: NPO status due to medical condition    NUTRITION INTERVENTION  Food and/or Nutrient Delivery:Start Tube Feeding   Nutrition education/counseling/coordination of care: Continue Inpatient Monitoring     NUTRITION MONITORING & EVALUATION:  Evaluation:Goals set   Goals: Pt will tolerate EN to provide 100% of needs until extubated and diet is adv  Monitoring: Mental Status/Confusion  or Nutrition Progression      OBJECTIVE DATA:  · Nutrition-Focused Physical Findings: intubated, -BM  · Wounds None      Past Medical History:   Diagnosis Date    Alcoholism ([facility]     Cardiac arrest ([facility] 08/2016    Depression     PAF (paroxysmal atrial fibrillation) ([facility])         ANTHROPOMETRICS  Current Height: 5' 9\" (175.3 cm)  Current Weight: 168 lb 6.9 oz (76.4 kg)    Admission weight: 168 lb 6.9 oz (76.4 kg)  Ideal Bodyweight 160 lb (72.7kg)   Usual Bodyweight 168-178 lb per EMR review   Weight Changes wt stable x1 month per EMR review      BMI BMI (Calculated): 24.9    Wt Readings from Last 50 Encounters:   03/11/20 168 lb 6.9 oz (76.4 kg)   03/11/20 178 lb 8 oz (81 kg)   02/14/20 168 lb 6.4 oz (76.4 kg)   12/04/19 174 lb (78.9 kg)       COMPARATIVE STANDARDS  Estimated Total Kcals/Day : 25-30  Current Bodyweight (76 kg) 4917-4253 kcal    Estimated Total Protein (g/day) : 1.2-1.4 Current Bodyweight (76 kg) 91-106g/day  Estimated Daily Total Fluid (ml/day): 6435-4187 mL per day     Food / Nutrition-Related History  Pre-Admission / Home Diet:  Pre-Admission/Home Diet: Unable to Obtain   Home Supplements / Herbals:    none noted  Food Restrictions / Cultural Requests:    none noted    Diet Orders / Intake / Nutrition Support  Current diet/supplement order: Diet NPO Effective Now     NSG Recorded PO:   PO Fluids P.O.: 0 mL  PO Meals     PO Intake: NPO  PO Supplement: NPO   PO Supplement Intake: NPO    Tube Feeding Goal: Jevity 1.2 @ 50 mL/hr +1 protein modular daily provides 1200

## 2020-03-13 NOTE — PROGRESS NOTES
Pt back to floor from IR for IVC filter placement. R IJ dressing clean, dry, intact with no drainage and no swelling noted around site. Will complete checks as ordered.     Electronically signed by Ariel Johnson RN on 3/13/2020 at 5:04 PM

## 2020-03-13 NOTE — PROGRESS NOTES
CONTINUOUS EEG    Name:  Todd Oneill Record Number:  [id number]  Age: 71 y.o. Gender: male  : 1950  Today's Date:  3/13/2020  Room:  3669/8142-14  Vital Signs   BP 88/61   Pulse 62   Temp 98.4 °F (36.9 °C) (Axillary)   Resp 21   Ht 5' 9\" (1.753 m)   Wt 168 lb 6.9 oz (76.4 kg)   SpO2 100%   BMI 24.87 kg/m²       Patient currently on continuous EEG monitoring. All EEG leads are currently in place with no current issues. Verified [facility] connection via team viewer. Checked in with patient RN for current plan of care. Comments:Leads reconnected after CT. Dee Groves at [facility] contacted to restart monitoring. All leads within 10K limit.     Electronically signed by Antonio Lagos on 3/13/2020 at 7:38 PM

## 2020-03-13 NOTE — PROGRESS NOTES
CONTINUOUS EEG    Name:  Todd Oneill Record Number:  [id number]  Age: 71 y.o. Gender: male  : 1950  Today's Date:  3/12/2020  Room:  4948/2663-05  Vital Signs   /84   Pulse 78   Temp 97.7 °F (36.5 °C) (Axillary)   Resp 16   Ht 5' 9\" (1.753 m)   Wt 168 lb 6.9 oz (76.4 kg)   SpO2 99%   BMI 24.87 kg/m²       Patient currently on continuous EEG monitoring. All EEG leads are currently in place with no current issues. Verified [facility] connection via team viewer. Checked in with patient RN for current plan of care. Comments: Continue monitoring. All leads within 10K limit.     Electronically signed by Queta Jolley on 3/12/2020 at 10:30 PM

## 2020-03-14 LAB
ANION GAP SERPL CALCULATED.3IONS-SCNC: 16 MMOL/L (ref 3–16)
BASE EXCESS ARTERIAL: -4 (ref -3–3)
BASOPHILS ABSOLUTE: 0 K/UL (ref 0–0.2)
BASOPHILS RELATIVE PERCENT: 0.8 %
BUN BLDV-MCNC: 10 MG/DL (ref 7–20)
CALCIUM SERPL-MCNC: 7.9 MG/DL (ref 8.3–10.6)
CHLORIDE BLD-SCNC: 104 MMOL/L (ref 99–110)
CO2: 19 MMOL/L (ref 21–32)
CREAT SERPL-MCNC: 0.7 MG/DL (ref 0.8–1.3)
EOSINOPHILS ABSOLUTE: 0.2 K/UL (ref 0–0.6)
EOSINOPHILS RELATIVE PERCENT: 3.9 %
GFR AFRICAN AMERICAN: >60
GFR NON-AFRICAN AMERICAN: >60
GLUCOSE BLD-MCNC: 81 MG/DL (ref 70–99)
HCO3 ARTERIAL: 21.2 MMOL/L (ref 21–29)
HCT VFR BLD CALC: 35.9 % (ref 40.5–52.5)
HEMOGLOBIN: 12 G/DL (ref 13.5–17.5)
LYMPHOCYTES ABSOLUTE: 0.6 K/UL (ref 1–5.1)
LYMPHOCYTES RELATIVE PERCENT: 12.3 %
MCH RBC QN AUTO: 32.8 PG (ref 26–34)
MCHC RBC AUTO-ENTMCNC: 33.5 G/DL (ref 31–36)
MCV RBC AUTO: 98.1 FL (ref 80–100)
MONOCYTES ABSOLUTE: 0.8 K/UL (ref 0–1.3)
MONOCYTES RELATIVE PERCENT: 15.5 %
NEUTROPHILS ABSOLUTE: 3.3 K/UL (ref 1.7–7.7)
NEUTROPHILS RELATIVE PERCENT: 67.5 %
O2 SAT, ARTERIAL: 96 % (ref 93–100)
PCO2 ARTERIAL: 35.5 MM HG (ref 35–45)
PDW BLD-RTO: 18.7 % (ref 12.4–15.4)
PERFORMED ON: ABNORMAL
PH ARTERIAL: 7.38 (ref 7.35–7.45)
PLATELET # BLD: 126 K/UL (ref 135–450)
PMV BLD AUTO: 9.5 FL (ref 5–10.5)
PO2 ARTERIAL: 85.7 MM HG (ref 75–108)
POC SAMPLE TYPE: ABNORMAL
POTASSIUM SERPL-SCNC: 4.1 MMOL/L (ref 3.5–5.1)
RBC # BLD: 3.66 M/UL (ref 4.2–5.9)
SODIUM BLD-SCNC: 139 MMOL/L (ref 136–145)
TCO2 ARTERIAL: 22 MMOL/L
WBC # BLD: 4.9 K/UL (ref 4–11)

## 2020-03-14 PROCEDURE — 2700000000 HC OXYGEN THERAPY PER DAY

## 2020-03-14 PROCEDURE — 6360000002 HC RX W HCPCS: Performed by: STUDENT IN AN ORGANIZED HEALTH CARE EDUCATION/TRAINING PROGRAM

## 2020-03-14 PROCEDURE — C9113 INJ PANTOPRAZOLE SODIUM, VIA: HCPCS | Performed by: STUDENT IN AN ORGANIZED HEALTH CARE EDUCATION/TRAINING PROGRAM

## 2020-03-14 PROCEDURE — 94150 VITAL CAPACITY TEST: CPT

## 2020-03-14 PROCEDURE — 2000000000 HC ICU R&B

## 2020-03-14 PROCEDURE — 94750 HC PULMONARY COMPLIANCE STUDY: CPT

## 2020-03-14 PROCEDURE — 94770 HC ETCO2 MONITOR DAILY: CPT

## 2020-03-14 PROCEDURE — 6360000002 HC RX W HCPCS: Performed by: NURSE PRACTITIONER

## 2020-03-14 PROCEDURE — 2580000003 HC RX 258: Performed by: STUDENT IN AN ORGANIZED HEALTH CARE EDUCATION/TRAINING PROGRAM

## 2020-03-14 PROCEDURE — 82803 BLOOD GASES ANY COMBINATION: CPT

## 2020-03-14 PROCEDURE — 2580000003 HC RX 258: Performed by: NURSE PRACTITIONER

## 2020-03-14 PROCEDURE — 36415 COLL VENOUS BLD VENIPUNCTURE: CPT

## 2020-03-14 PROCEDURE — 85025 COMPLETE CBC W/AUTO DIFF WBC: CPT

## 2020-03-14 PROCEDURE — 6370000000 HC RX 637 (ALT 250 FOR IP): Performed by: INTERNAL MEDICINE

## 2020-03-14 PROCEDURE — 94003 VENT MGMT INPAT SUBQ DAY: CPT

## 2020-03-14 PROCEDURE — 80048 BASIC METABOLIC PNL TOTAL CA: CPT

## 2020-03-14 PROCEDURE — 94761 N-INVAS EAR/PLS OXIMETRY MLT: CPT

## 2020-03-14 PROCEDURE — 6360000002 HC RX W HCPCS: Performed by: SURGERY

## 2020-03-14 PROCEDURE — 95813 EEG EXTND MNTR 61-119 MIN: CPT

## 2020-03-14 PROCEDURE — 99291 CRITICAL CARE FIRST HOUR: CPT | Performed by: INTERNAL MEDICINE

## 2020-03-14 PROCEDURE — 94799 UNLISTED PULMONARY SVC/PX: CPT

## 2020-03-14 RX ADMIN — Medication 10 ML: at 21:00

## 2020-03-14 RX ADMIN — PROPOFOL 40 MCG/KG/MIN: 10 INJECTION, EMULSION INTRAVENOUS at 00:14

## 2020-03-14 RX ADMIN — Medication 15 ML: at 22:00

## 2020-03-14 RX ADMIN — PROPOFOL 50 MCG/KG/MIN: 10 INJECTION, EMULSION INTRAVENOUS at 07:51

## 2020-03-14 RX ADMIN — Medication 10 ML: at 09:53

## 2020-03-14 RX ADMIN — LEVETIRACETAM 1000 MG: 100 INJECTION, SOLUTION INTRAVENOUS at 21:01

## 2020-03-14 RX ADMIN — HYDRALAZINE HYDROCHLORIDE 10 MG: 20 INJECTION INTRAMUSCULAR; INTRAVENOUS at 17:33

## 2020-03-14 RX ADMIN — Medication 15 ML: at 08:10

## 2020-03-14 RX ADMIN — LEVETIRACETAM 1000 MG: 100 INJECTION, SOLUTION INTRAVENOUS at 09:53

## 2020-03-14 RX ADMIN — PANTOPRAZOLE SODIUM 40 MG: 40 INJECTION, POWDER, FOR SOLUTION INTRAVENOUS at 09:53

## 2020-03-14 RX ADMIN — PROPOFOL 30 MCG/KG/MIN: 10 INJECTION, EMULSION INTRAVENOUS at 12:51

## 2020-03-14 ASSESSMENT — PULMONARY FUNCTION TESTS
PIF_VALUE: 15
PIF_VALUE: 13
PIF_VALUE: 16
PIF_VALUE: 13
PIF_VALUE: 16
PIF_VALUE: 17
PIF_VALUE: 23
PIF_VALUE: 13
PIF_VALUE: 20
PIF_VALUE: 16
PIF_VALUE: 22
PIF_VALUE: 16
PIF_VALUE: 35
PIF_VALUE: 16

## 2020-03-14 ASSESSMENT — PAIN SCALES - GENERAL
PAINLEVEL_OUTOF10: 0

## 2020-03-14 NOTE — PROGRESS NOTES
Pt resting comfortably in bed, no signs of acute distress noted. No acute events noted. Puncture wound to right IJ from IVC placement clean, dry and intact. Pulses to BLE and BUE noted. Pt agitated upon movement. Restraints in place. Vital signs within defined limits. Continuous EEG in place.

## 2020-03-14 NOTE — PROGRESS NOTES
Hospitalist Progress Note      PCP: No primary care provider on file. Date of Admission: 3/11/2020    Chief Complaint: Found down. Transferred from [facility] for intracranial hemorrhage. Hospital Course: Admitted for traumatic intracranial hemorrhage with mild vasogenic edema and seizures. Was On vent, antiepileptics and CIWA protocol. Off nicardipine drip. Subjective: Patient intubated, open eyes to voice. Plan to extubate today    Medications:  Reviewed    Infusion Medications    sodium chloride 75 mL/hr at 03/13/20 1231    niCARdipene (CARDENE) infusion Stopped (03/11/20 1722)    propofol 50 mcg/kg/min (03/14/20 0751)     Scheduled Medications    chlorhexidine  15 mL Mouth/Throat BID    pantoprazole  40 mg Intravenous Daily    sodium chloride flush  10 mL Intravenous 2 times per day    levetiracetam  1,000 mg Intravenous Q12H    sodium chloride flush  10 mL Intravenous 2 times per day     PRN Meds: labetalol, hydrALAZINE, sodium chloride flush, promethazine **OR** ondansetron, sodium chloride flush      Intake/Output Summary (Last 24 hours) at 3/14/2020 0928  Last data filed at 3/14/2020 0600  Gross per 24 hour   Intake 1157.26 ml   Output 1125 ml   Net 32.26 ml       Physical Exam Performed:    /80   Pulse 71   Temp 97 °F (36.1 °C) (Axillary)   Resp 16   Ht 5' 9\" (1.753 m)   Wt 168 lb 6.9 oz (76.4 kg)   SpO2 99%   BMI 24.87 kg/m²     General appearance: No apparent distress, appears stated age and cooperative. HEENT: Pupils equal, round, and reactive to light. Conjunctivae/corneas clear. Neck: Supple, with full range of motion. No jugular venous distention. Trachea midline. Respiratory: on vent, Clear to auscultation, bilaterally without Rales/Wheezes/Rhonchi. Cardiovascular: Regular rate and rhythm with normal S1/S2 without murmurs, rubs or gallops. Abdomen: Soft, non-tender, non-distended with normal bowel sounds.   Musculoskeletal: Left lower extremity edema

## 2020-03-14 NOTE — PROGRESS NOTES
Progress Note    Updates  Extubated. Alert, following commands.     Past Medical History:   Diagnosis Date    Alcoholism [facility])     Cardiac arrest ([facility] [address] 08/2016    PAF (paroxysmal atrial fibrillation) ([facility])      Current Facility-Administered Medications:     chlorhexidine (PERIDEX) 0.12 % solution 15 mL, 15 mL, Mouth/Throat, BID, Portia Cosme MD, 15 mL at 03/14/20 0810    0.9 % sodium chloride infusion, , Intravenous, Continuous, Alfonzo Mcdaniel MD, Last Rate: 75 mL/hr at 03/13/20 1231    labetalol (NORMODYNE;TRANDATE) injection syringe 10 mg, 10 mg, Intravenous, Q4H PRN, Alfonzo Mcdaniel MD    hydrALAZINE (APRESOLINE) injection 10 mg, 10 mg, Intravenous, Q4H PRN, Alfonzo Mcdaniel MD, 10 mg at 03/13/20 0449    pantoprazole (PROTONIX) injection 40 mg, 40 mg, Intravenous, Daily, Leisa Rosario MD, 40 mg at 03/14/20 0953    niCARdipine (CARDENE) 25 mg in sodium chloride 0.9 % 250 mL infusion, 5 mg/hr, Intravenous, Continuous, HEIDI Watson CNP, Stopped at 03/11/20 1722    sodium chloride flush 0.9 % injection 10 mL, 10 mL, Intravenous, 2 times per day, Leisa Rosario MD, 10 mL at 03/14/20 0953    sodium chloride flush 0.9 % injection 10 mL, 10 mL, Intravenous, PRN, Leisa Rosario MD    promethazine (PHENERGAN) tablet 12.5 mg, 12.5 mg, Oral, Q6H PRN **OR** ondansetron (ZOFRAN) injection 4 mg, 4 mg, Intravenous, Q6H PRN, Leisa Rosario MD    propofol injection, 10 mcg/kg/min, Intravenous, Titrated, Leisa Rosario MD, Stopped at 03/14/20 1310    levETIRAcetam (KEPPRA) 1,000 mg in sodium chloride 0.9 % 100 mL IVPB, 1,000 mg, Intravenous, Q12H, HEIDI Watson CNP, Stopped at 03/14/20 1008    sodium chloride flush 0.9 % injection 10 mL, 10 mL, Intravenous, 2 times per day, Leisa Rosario MD, 10 mL at 03/14/20 0953    sodium chloride flush 0.9 % injection 10 mL, 10 mL, Intravenous, PRN, Leisa Rosario MD    Exam  Blood pressure 139/82, pulse 79, temperature 100 °F (37.8 °C), hematoma. Mild adjacent edema. CTV head w/wo 3/13/20  Limited. No cortical venous thrombosis noted on the R.      CTA head/neck 3/11/20  No significant stenosis, occlusions, or malformations. BLE venous US 3/12/20  Acute R/L DVTs. CVEEG  03/13/2020 00:00am to 08:00am  SEIZURES:  None  INTERICTAL:  None  BACKGROUND:  Abundant generalized alpha/beta frequencies   Frequent brief runs of generalized rhythmic 1.5-2 Hz delta activity      03/12/2020 08:00am to 23:59pm  SEIZURES:  None  INTERICTAL:  None  BACKGROUND:  Abundant generalized alpha/beta frequencies     Impression  1. Suspected seizure in the setting of acute R parietal ICH. Subsequent imaging is stable. Recent cvEEG has been w/out recorded seizures or epileptiform discharges. He is now extubated. Mental status improving. S/p IVC filter. Recommendations  1. MRI brain w/o.    2.  Keppra 1000 mg BID. 3.  Continue cvEEG through today. If unremarkable, can d/c tomorrow.      Scott Colón NP  [address] [place] [address] [address] Neurology    A copy of this note was provided for Dr Ketty Schmidt MD

## 2020-03-14 NOTE — PROGRESS NOTES
MECHANICAL VENTILATION WEANING PROTOCOL    PRE-TRIAL PATIENT ASSESSMENT - COMPLETED AT 1150    Ventilatory Assessment:    PARAMETER CRITERIA FOR WEANING   Spontaneous Cough:  Yes    Sputum Characteristics:  · Sputum Amount: Large  · Tenacity: Thick, Thin  · Sputum Color: Yellow SPONTANEOUS COUGH With small to moderate  Amount of secretions   FiO2 : 40 % FIO2 less than or equal to 50%     PEEP less than or equal to 8   Progressive Mobility Protocol  Yes     ABG:  Lab Results   Component Value Date    PHART 7.472 03/11/2020    JVF2NDW 41.3 03/11/2020    PO2ART 103.2 03/11/2020    U0UTXXVO 98 03/11/2020    IMU8KZK 30.2 03/11/2020    BEART 7 03/11/2020     HGB/WBC:  Lab Results   Component Value Date    HGB 12.0 03/14/2020    WBC 4.9 03/14/2020        Vital Signs:    PARAMETER CRITERIA FOR WEANING Meets Criteria   Pulse: 85 Within patient's normal limits / stable Yes   Resp: 15 Less than or equal to 30 Yes   BP: (!) 147/82 Within patient's normal limits / minimal pressors (Hemodynamically Stable) Yes   SpO2: 100 % Greater than or equal to 90% Yes   End Tidal CO2: 28 (%) Within patient's normal limits Yes   Temp: 99.3 °F (37.4 °C) Less than 38. 5oC / 101. 3oF Yes     [x]    Based on this assessment and the [facility] Ventilator Weaning Protocol, this patient  IS being placed on a Spontaneous Breathing Trial (SBT) at this time.  []    Based on this assessment and the [facility] Ventilator Weaning Protocol, this patient  IS NOT being placed on a Spontaneous Breathing Trial (SBT) at this time. []    Patient  IS NOT being placed on a Spontaneous Breathing Trial (SBT) at this time because of factors not previously addressed.   Those factors include   Vital Capacity (VC) = 1500 ml    Maximum Inspiratory Force (MIF) = -19    SBT - Initiated at  1152    Ventilator Settings:  CPAP - 5 cmH2O, PS - 8 cmH2O(if using settings other than CPAP 5/PS 8, please explain reasons for settings here):       1 Minute SBT Respiratory Parameters:   VE: 9.7

## 2020-03-14 NOTE — PROGRESS NOTES
Progress Note    Admit Date: 3/11/2020  Day: 3/14/2020   Diet: Diet NPO Effective Now    CC: ICH    Interval history: no acute events overnight. IVC filter placed 3/13. No epileptiform discharges on EEG as of 3/13 2200. Responsive and following commands bilaterally to give thumbs up. HPI: \"Patient transferred from [facility] because of intracerebral hemorrhage. CT-head revealed left parietal bleed.  He had been found with left-sided weakness at his assisted living facility, andin the ED had apparent seizure activity.  He became cyanotic, appeared to have respiratory arrest and was briefly pulseless.  Very brief CPR performed, and he was intubated promptly. [facility] is now seen on mechanical ventilation support and receiving IV sedation with propofol.     Patient arrived on ventilatory support and sedated on propofol due. Patient withdrawing to pain in all extremities and with equal and reactive pupils but otherwise obtunded.  LLE significantly edematous/swollen as well. \"     Medications:     Scheduled Meds:   chlorhexidine  15 mL Mouth/Throat BID    pantoprazole  40 mg Intravenous Daily    sodium chloride flush  10 mL Intravenous 2 times per day    levetiracetam  1,000 mg Intravenous Q12H    sodium chloride flush  10 mL Intravenous 2 times per day     Continuous Infusions:   sodium chloride 75 mL/hr at 03/13/20 1231    niCARdipene (CARDENE) infusion Stopped (03/11/20 1722)    propofol 40 mcg/kg/min (03/14/20 0400)     PRN Meds:labetalol, hydrALAZINE, sodium chloride flush, promethazine **OR** ondansetron, sodium chloride flush    Objective:   Vitals:   T-max:  Patient Vitals for the past 8 hrs:   BP Temp Temp src Pulse Resp SpO2   03/14/20 0600 (!) 149/82 -- -- 71 17 --   03/14/20 0551 -- -- -- 72 15 98 %   03/14/20 0500 (!) 160/105 -- -- 76 16 98 %   03/14/20 0400 (!) 141/85 97 °F (36.1 °C) Axillary 63 16 99 %   03/14/20 0345 -- -- -- 64 16 99 %   03/14/20 0344 -- -- -- -- 16 99 %   03/14/20 hemorrhage right occipital lobe      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 IR GUIDED IVC FILTER PLACEMENT   Final Result   1. Widely patent vena cava with no evidence of venous thrombosis. 2. Satisfactory placement positioning of option inferior vena cava filter as described. XR ABDOMEN (KUB) (SINGLE AP VIEW)   Final Result      Frontal view demonstrates an unremarkable bowel gas pattern. Catheter projects over the pelvis. No IVC filter is visualized. VL Extremity Venous Bilateral   Final Result      CT head without contrast   Final Result      1. Stable right parietal intraparenchymal hematoma. Mild adjacent edema. No new or progressive intracranial hemorrhage. XR PELVIS (1-2 VIEWS)   Final Result   1. No acute abnormality. XR FEMUR LEFT (MIN 2 VIEWS)   Final Result   1. No acute osseous abnormality. XR TIBIA FIBULA LEFT (2 VIEWS)   Final Result   1. Soft tissue swelling without acute osseous abnormality. XR FOOT LEFT (MIN 3 VIEWS)   Final Result   1. No acute osseous normality. 2. Extensive soft tissue swelling.       MRI BRAIN WO CONTRAST    (Results Pending)       Assessment/Plan:   71 yom with ICH     Acute nontraumatic right parietal intracranial hemorrhage with mild vasogenic edema  - neurosurgery consulted, appreciate recs  - repeat CT head stable  - goal SBP <160, hydralazine and labetalol PRN   - elevate head of bed 30 degrees  - no antiplatelets or anticoagulants  - neurochecks q1 hr  - continuous EEG     Bilateral acute and chronic lower extremity DVT  - IVC filter placed 3/13  - cannot AC d/t ICH    Acute encephalopathy  - f/u urine tox - neg    Mechanical Ventilation  - on propofol 50   - will attempt SBT today     Urinary tract infection  - Rocephin x 3 days     Hypertensive emergency - resolved  - nicardipine gtt weaned  - SBP < 140    Acute kidney injury - resolved  - baseline creatinine normal  - monitor with fluids     Type 2 NSTEMI - resolved  - trop 0.06 x 3, will stop trending     Chronic:  Seizure disorder - takes Keppra 500mg BID at home  Afib - AC contraindicated due to fall risk, seizure disorder, brain bleed  CAD s/p NSTEMI - continue statin, Toprol  Depression - holding trazodone and Zoloft  GERD - continue Protonix  EtOH abuse - CIWA assessment, rally pack     Code Status: Full code  FEN: NPO, NS @ 75 cc/hr  PPX: Protonix 40 IV daily, SCDs  Nilay Patel DO, PGY-1  03/14/20  6:24 AM    This patient will be staffed and discussed with Dr. Roseanne Bolanos  Patient examined, findings as discussed with Dr. Stevo white. Agree with assessment and plan. On spontaneous breathing trial, he demonstrates adequate ventilation and gas exchange and is extubated. Mentation post extubation is normal, off all sedation.   Time spent in critical care 40 minutes

## 2020-03-14 NOTE — PROGRESS NOTES
CONTINUOUS EEG    Name:  Todd Oneill Record Number:  [id number]  Age: 71 y.o. Gender: male  : 1950  Today's Date:  3/14/2020  Room:  0275/2761-68  Vital Signs   /82   Pulse 79   Temp 100 °F (37.8 °C) (Axillary)   Resp 20   Ht 5' 9\" (1.753 m)   Wt 168 lb 6.9 oz (76.4 kg)   SpO2 96%   BMI 24.87 kg/m²       Patient currently on continuous EEG monitoring. All EEG leads are currently in place with no current issues. Verified [facility] connection via team viewer. Checked in with patient RN for current plan of care. Comments: Continue monitoring. All leads within 10K limit.  Reattached several leads all within normal limits    Electronically signed by Lukas Bennett on 3/14/2020 at 4:57 PM

## 2020-03-14 NOTE — PLAN OF CARE
assessed. IV antibiotics per MAR. Will continue to monitor and reassess. Outcome: Ongoing     Problem: Venous Thromboembolism:  Goal: Absence of signs or symptoms of impaired coagulation  Description: Absence of signs or symptoms of impaired coagulation. Lower extremities remains swollen. Feet elevated. Active range of motion and bed exercises encouraged. Will continue to monitor and reassess. Outcome: Ongoing     Problem: Falls - Risk of:  Goal: Will remain free from falls  Description: Patient will remain free of falls throughout the duration of the shift. Bed locked in lowest position. Non skid socks on. Bed and chair alarm activated. Call light with reach and patient has been using it appropriately. Patient rounded on frequently. Room door open at all times. 3/4 side rails up. Will continue to monitor and reassess. Outcome: Ongoing     Problem: Pain:  Goal: Control of acute pain  Description: Control of acute pain. Patient denies being in any acute pain at this time. Patient encouraged to call out if experiencing breakthrough pain. Will continue to monitor and reassess. Outcome: Ongoing     Problem: Physical Regulation:  Goal: Signs of adequate cerebral perfusion will increase  Description: Signs of adequate cerebral perfusion will increase. Patient remains on continuous EEG. No seizure activity noted thus far this shift. Patient is awake and alert and oriented. Will continue to monitor and reassess. Outcome: Ongoing     Problem: Nutrition  Goal: Optimal nutrition therapy  Description: Patient educated on aspirations precautions post extubation. Will plan to progressively advance diet as ordered. Will make NPO if signs of aspiration are present. Will continue to monitor and reassess.     Outcome: Ongoing

## 2020-03-14 NOTE — PROGRESS NOTES
Patient has been successfully weaned from Mechanical Ventilation. RSBI before extubation was 26 with EtCO2 of 32 and SpO2 of 100 on 40% FiO2. Patient extubated and placed on 3 liters/min via nasal cannula. Post extubation SpO2 is 100% with HR  82 bpm and RR 21 breaths/min. Patient had strong cough that was productive of white sputum. Extubation Well tolerated by patient. Eve Richardson

## 2020-03-14 NOTE — PROGRESS NOTES
CONTINUOUS EEG    Name:  Todd Oneill Record Number:  [id number]  Age: 71 y.o. Gender: male  : 1950  Today's Date:  3/13/2020  Room:  0504/7753-20  Vital Signs   BP (!) 153/95   Pulse 60   Temp 98.4 °F (36.9 °C) (Axillary)   Resp 16   Ht 5' 9\" (1.753 m)   Wt 168 lb 6.9 oz (76.4 kg)   SpO2 100%   BMI 24.87 kg/m²       Patient currently on continuous EEG monitoring. All EEG leads are currently in place with no current issues. Verified [facility] connection via team viewer. Checked in with patient RN for current plan of care. Comments: Continue monitoring. All leads within 10K limit.     Electronically signed by Misael Gary on 3/13/2020 at 10:15 PM

## 2020-03-15 ENCOUNTER — APPOINTMENT (OUTPATIENT)
Dept: CT IMAGING | Age: 70
DRG: 957 | End: 2020-03-15
Attending: INTERNAL MEDICINE
Payer: MEDICARE

## 2020-03-15 LAB
ANION GAP SERPL CALCULATED.3IONS-SCNC: 20 MMOL/L (ref 3–16)
BASOPHILS ABSOLUTE: 0 K/UL (ref 0–0.2)
BASOPHILS RELATIVE PERCENT: 0.6 %
BUN BLDV-MCNC: 8 MG/DL (ref 7–20)
CALCIUM SERPL-MCNC: 8.6 MG/DL (ref 8.3–10.6)
CHLORIDE BLD-SCNC: 105 MMOL/L (ref 99–110)
CO2: 15 MMOL/L (ref 21–32)
CREAT SERPL-MCNC: 0.7 MG/DL (ref 0.8–1.3)
EOSINOPHILS ABSOLUTE: 0.1 K/UL (ref 0–0.6)
EOSINOPHILS RELATIVE PERCENT: 1 %
GFR AFRICAN AMERICAN: >60
GFR NON-AFRICAN AMERICAN: >60
GLUCOSE BLD-MCNC: 78 MG/DL (ref 70–99)
HCT VFR BLD CALC: 41.8 % (ref 40.5–52.5)
HEMOGLOBIN: 13.4 G/DL (ref 13.5–17.5)
LYMPHOCYTES ABSOLUTE: 0.7 K/UL (ref 1–5.1)
LYMPHOCYTES RELATIVE PERCENT: 10.7 %
MCH RBC QN AUTO: 32.3 PG (ref 26–34)
MCHC RBC AUTO-ENTMCNC: 32.1 G/DL (ref 31–36)
MCV RBC AUTO: 100.9 FL (ref 80–100)
MONOCYTES ABSOLUTE: 1.2 K/UL (ref 0–1.3)
MONOCYTES RELATIVE PERCENT: 19 %
NEUTROPHILS ABSOLUTE: 4.4 K/UL (ref 1.7–7.7)
NEUTROPHILS RELATIVE PERCENT: 68.7 %
PDW BLD-RTO: 18.5 % (ref 12.4–15.4)
PLATELET # BLD: 134 K/UL (ref 135–450)
PMV BLD AUTO: 8.4 FL (ref 5–10.5)
POTASSIUM SERPL-SCNC: 4.1 MMOL/L (ref 3.5–5.1)
RBC # BLD: 4.14 M/UL (ref 4.2–5.9)
SODIUM BLD-SCNC: 140 MMOL/L (ref 136–145)
WBC # BLD: 6.5 K/UL (ref 4–11)

## 2020-03-15 PROCEDURE — 2580000003 HC RX 258: Performed by: NURSE PRACTITIONER

## 2020-03-15 PROCEDURE — 6370000000 HC RX 637 (ALT 250 FOR IP): Performed by: INTERNAL MEDICINE

## 2020-03-15 PROCEDURE — 6360000002 HC RX W HCPCS: Performed by: SURGERY

## 2020-03-15 PROCEDURE — 6370000000 HC RX 637 (ALT 250 FOR IP): Performed by: STUDENT IN AN ORGANIZED HEALTH CARE EDUCATION/TRAINING PROGRAM

## 2020-03-15 PROCEDURE — 80048 BASIC METABOLIC PNL TOTAL CA: CPT

## 2020-03-15 PROCEDURE — 2700000000 HC OXYGEN THERAPY PER DAY

## 2020-03-15 PROCEDURE — 95813 EEG EXTND MNTR 61-119 MIN: CPT

## 2020-03-15 PROCEDURE — 85025 COMPLETE CBC W/AUTO DIFF WBC: CPT

## 2020-03-15 PROCEDURE — 6360000002 HC RX W HCPCS: Performed by: STUDENT IN AN ORGANIZED HEALTH CARE EDUCATION/TRAINING PROGRAM

## 2020-03-15 PROCEDURE — 2500000003 HC RX 250 WO HCPCS: Performed by: SURGERY

## 2020-03-15 PROCEDURE — 2580000003 HC RX 258: Performed by: INTERNAL MEDICINE

## 2020-03-15 PROCEDURE — 6360000002 HC RX W HCPCS: Performed by: NURSE PRACTITIONER

## 2020-03-15 PROCEDURE — 99233 SBSQ HOSP IP/OBS HIGH 50: CPT | Performed by: INTERNAL MEDICINE

## 2020-03-15 PROCEDURE — C9113 INJ PANTOPRAZOLE SODIUM, VIA: HCPCS | Performed by: STUDENT IN AN ORGANIZED HEALTH CARE EDUCATION/TRAINING PROGRAM

## 2020-03-15 PROCEDURE — 2580000003 HC RX 258: Performed by: STUDENT IN AN ORGANIZED HEALTH CARE EDUCATION/TRAINING PROGRAM

## 2020-03-15 PROCEDURE — 74176 CT ABD & PELVIS W/O CONTRAST: CPT

## 2020-03-15 PROCEDURE — 94761 N-INVAS EAR/PLS OXIMETRY MLT: CPT

## 2020-03-15 PROCEDURE — 36415 COLL VENOUS BLD VENIPUNCTURE: CPT

## 2020-03-15 PROCEDURE — 92610 EVALUATE SWALLOWING FUNCTION: CPT

## 2020-03-15 PROCEDURE — 2060000000 HC ICU INTERMEDIATE R&B

## 2020-03-15 RX ORDER — SODIUM CHLORIDE, SODIUM LACTATE, POTASSIUM CHLORIDE, CALCIUM CHLORIDE 600; 310; 30; 20 MG/100ML; MG/100ML; MG/100ML; MG/100ML
INJECTION, SOLUTION INTRAVENOUS CONTINUOUS
Status: DISCONTINUED | OUTPATIENT
Start: 2020-03-15 | End: 2020-03-17

## 2020-03-15 RX ADMIN — Medication 15 ML: at 22:07

## 2020-03-15 RX ADMIN — PANTOPRAZOLE SODIUM 40 MG: 40 INJECTION, POWDER, FOR SOLUTION INTRAVENOUS at 09:20

## 2020-03-15 RX ADMIN — Medication 10 ML: at 09:22

## 2020-03-15 RX ADMIN — LEVETIRACETAM 1000 MG: 100 INJECTION, SOLUTION INTRAVENOUS at 22:08

## 2020-03-15 RX ADMIN — SODIUM CHLORIDE, POTASSIUM CHLORIDE, SODIUM LACTATE AND CALCIUM CHLORIDE: 600; 310; 30; 20 INJECTION, SOLUTION INTRAVENOUS at 10:21

## 2020-03-15 RX ADMIN — Medication 15 ML: at 10:21

## 2020-03-15 RX ADMIN — HYDRALAZINE HYDROCHLORIDE 10 MG: 20 INJECTION INTRAMUSCULAR; INTRAVENOUS at 03:30

## 2020-03-15 RX ADMIN — LEVETIRACETAM 1000 MG: 100 INJECTION, SOLUTION INTRAVENOUS at 09:20

## 2020-03-15 RX ADMIN — HYDRALAZINE HYDROCHLORIDE 10 MG: 20 INJECTION INTRAMUSCULAR; INTRAVENOUS at 14:24

## 2020-03-15 RX ADMIN — LABETALOL 20 MG/4 ML (5 MG/ML) INTRAVENOUS SYRINGE 10 MG: at 17:37

## 2020-03-15 ASSESSMENT — PAIN SCALES - GENERAL
PAINLEVEL_OUTOF10: 0

## 2020-03-15 NOTE — PROGRESS NOTES
evidence of previous IVC filter  S/p IVC filter placement 03/13/20  Had ran marathons in the past. States that he walks 5 miles once or twice a day. Pt states that his father had blood clots  Denies smoking, denies prostate problems. Had colonoscopy 3 years ago which showed benign polyps per patient. Was recommended repeat colonoscopy in 3-5 years  Will get CT chest/abd/pelvis to r/o any malignancy  Discussed would need to schedule colonoscopy and possible hypercoagulable workup as an outpatient if imaging neg    History of A.  Fib:   Anticoagulation contraindicated due to fall risk, seizure disorder and recent brain bleed    CAD status post and STEMI:   Continue statin and Toprol    Alcohol abuse:   Cont CIWA protocol    Hypomagnesemia/Hypokalemia: Resolved  Monitor and replace as needed    AG acidosis:  Likely sec to IVF, NS changed to LR  Speech eval, if able to take PO then Discontinue IVF    DVT Prophylaxis: SCD's  Diet: Diet NPO Effective Now  Code Status: Full Code    PT/OT Eval Status: Once stable    Rashmi Godoy MD

## 2020-03-15 NOTE — PROCEDURES
Continuous EEG monitoring record    Patient name: Yesenia Archuleta    START: 03/14/2020 @ 10:00am  END: 03/15/2020 @ 09:00am      Electroencephalographer: Filiberto Ngo MD PhD      CLINICAL DETAILS:  This EEG was performed on this 71 y. o.yo male admitted for Altered mental Status and concer for subclinical seizures      TECHNICAL DETAILS:  Continuous video-EEG monitoring was performed with 27 surface scalp electrodes placed according to the International 10-20 electrode placement system, using a 32-channel We[employer]inhai headbox. All EEG and video information was acquired digitally, including the use of automated spike and seizure detection software to detect epileptiform activity. An event button was also available to be depressed during clinical events. 03/14/2020    22:00pm to 09:00am on 03/15/2020  SEIZURES:  No further seizures during this epoch   INTERICTAL:  Frequent bifrontal sharps  PUSHBUTTONS:  None  BACKGROUND:  Abundant generalized alpha/beta frequencies   EKG:  regular    03/14/2020   10:00am to 22:00pm  SEIZURES:  21:48pm bifrontal subclinical seizure lasting 21 seconds. INTERICTAL:  Frequent bifrontal sharps  PUSHBUTTONS:  None  BACKGROUND:  Abundant generalized alpha/beta frequencies   EKG:  regular    CLINICAL INTERPRETATION:  This was an abnormal tracing for age and state due to a mild to moderate generalized slow wave abnormality indicating diffuse cerebral dysfunction which can be of multiple causes, including structural, or vascular abnormalities, toxic/metabolic conditions, hydrocephalus, or postictal conditions. Frequent bifrontal sharps indicate a generalized cortical hyperexcitability that may be associated with seizures. Indeed there was a single brief nonconvulsive bifrontal seizure at 21:48pm as detailed above. Clinical correlation is advised.         Filiberto Ngo MD PhD

## 2020-03-15 NOTE — PLAN OF CARE
extremities improved today. Feet elevated. Active range of motion and bed exercises encouraged. Will continue to monitor and reassess. Outcome: Ongoing     Problem: Falls - Risk of:  Goal: Will remain free from falls  Description: Patient will remain free of falls throughout the duration of the shift. Bed locked in lowest position. Non skid socks on. Bed and chair alarm activated. Call light with reach and patient has been using it appropriately. Patient rounded on frequently. Room door open at all times. 3/4 side rails up. Will continue to monitor and reassess. 3/15/2020 1351 by Leonid Rivas RN  Outcome: Ongoing     Problem: Pain:  Goal: Control of acute pain  Description: Control of acute pain. Patient denies being in any acute pain at this time. Patient encouraged to call out if experiencing breakthrough pain. Will continue to monitor and reassess. Outcome: Ongoing     Problem: Physical Regulation:  Goal: Signs of adequate cerebral perfusion will increase  Description: Signs of adequate cerebral perfusion will increase. Patient remains on continuous EEG. No seizure activity noted thus far this shift, reported subclinical seizure overnight. Patient is awake and alert and oriented. Patient educated on seizures and seizure prevention. Will continue to monitor and reassess. Outcome: Ongoing     Problem: Nutrition  Goal: Optimal nutrition therapy  Description: Patient educated on aspirations precautions post extubation. Diet order placed per SLP evaluation. Patient does have good appetite. Patient is tolerating PO fluids. Will continue to monitor and reassess. 3/15/2020 1351 by Leonid Rivas RN  Outcome: Ongoing     Problem: Respiratory:  Goal: Ability to maintain a clear airway will improve  Description: Ability to maintain a clear airway will improve. Patient passed beside swallow evaluation this morning. Patient evaluated by SLP this afternoon. Diet order placed per SLP recommendation. Lungs assessed. Will continue to monitor and reassess.     Outcome: Ongoing

## 2020-03-15 NOTE — PROGRESS NOTES
Speech Language Pathology  Facility/Department: [facility] ICU   CLINICAL BEDSIDE SWALLOW EVALUATION    NAME: Mel Menjivar  : 1950  MRN: [id number]    ADMISSION DATE: 3/11/2020  ADMITTING DIAGNOSIS: has NSTEMI (non-ST elevated myocardial infarction) ([facility] [address] GERD (gastroesophageal reflux disease); HTN (hypertension), benign; Depression; Elevated d-dimer; New onset seizure ([facility] [address] Acute encephalopathy; Hypokalemia; Intracranial hemorrhage ([facility] [address] Acute respiratory failure with hypoxia and hypercapnia ([facility] [address] and Essential hypertension on their problem list.  ONSET DATE: 3/11/2020    Recent Chest Xray 3/11/2020  CHEST:       1.  Endotracheal tube terminates below the level of the clavicles, 6-7 cm   above the marian.       2.  Left greater than right basilar subsegmental atelectasis.  No lobar   consolidation. CT of HEAD 3/11/2020  1. Stable right parietal intraparenchymal hematoma. Mild adjacent edema. No new or progressive intracranial hemorrhage.         Date of Eval: 3/15/2020  Evaluating Therapist: Dorothy Restrepo    Current Diet level:  Current Diet : NPO  Current Liquid Diet : NPO    Primary Complaint  Patient Complaint: denies any problems, states he has mucous because \"I have a cold.'    Pain:  Pain Assessment  Pain Assessment: Denies    Reason for Referral  Mel Menjivar was referred for a bedside swallow evaluation to assess the efficiency of his swallow function, identify signs and symptoms of aspiration and make recommendations regarding safe dietary consistencies, effective compensatory strategies, and safe eating environment. HISTORY OF PRESENT ILLNESS:  Per MD notes:  71 yom hx of alcoholism, depression, pAF, HTN, CAD hx of NSTEMI, seizure disorder found down for unknown duration at assisted living. Found to have left sided deficits (leftward gaze deviation, flattened nasolabial fold, LUE weakness, LLE weakness).  Of note patient was recently admitted to hospital for possible seizures and placed on Keppra. In outside ED CT-head revealed left parietal bleed. Ativan 2mg x 2 administered for possible seizure. Loaded with Keppra. Patient became cyanotic and pulse was not palpated and appeared to stop breathing prompting CPR and RSI. After patient was stabilized he was hypertensive requiring nicardipine gtt. Troponin elevated to 0.06. ESDRAS. Small leuk est on UA. XR left forearm without fracture. On arrival to [facility] ICU patient intubated and on 30 of propofol. Decision made to keep propofol running d/t fighting the vent. Patient withdrawing to pain in all extremities and with equal and reactive pupils but otherwise obtunded. LLE significantly edematous/swollen as well.        Impression  Pt alert, oriented, follows commands and answered questions appropriately, though verbose. Oral structures grossly intact, no asymmetry noted. Pt able to cough and swallow on command. Presented pt with puree, thin liquids via cup / straw, including 3 ounces of uninterrupted swallows, as well as a buster cracker. Pt demonstrated single instance of throat clearing after first sip of water, that pt wished to take from straw. Pt then demonstration no other overt signs of aspiration: no coughing/throat clearing or change in vocal quality. Good labial seal with no anterior loss of liquids. Good swallow movement noted upon palpation of anterior neck. Pt exhibited no difficulty with mastication of cracker, no oral residue. No c/o globus sensation. Pt does have yankauer that he is using to suction occasional secretions. Dysphagia Diagnosis: Swallow function appears grossly intact  Dysphagia Outcome Severity Scale: Level 6: Within functional limits/Modified independence     Treatment Plan  Requires SLP Intervention: Yes  Duration/Frequency of Treatment: 2-3 x  D/C Recommendations:  To be determined     Recommended Diet and Intervention  Diet Solids Recommendation: Dysphagia Soft and Bite-Sized (Dysphagia III)  Liquid Consistency Recommendation: Thin - if any s/s of aspiration emerge, or there is respiratory decline,  make NPO until further evaluated by SLP    Recommended Form of Meds: Whole with water     Therapeutic Interventions: Diet tolerance monitoring;Patient/Family education;Oral care    Compensatory Swallowing Strategies  Compensatory Swallowing Strategies: Upright as possible for all oral intake;Small bites/sips    Treatment/Goals  Pt to be seen to address the following goals:  1-The patient will tolerate recommended diet without observed clinical signs of aspiration  2- The patient Sherri Usha will verbalize/demonstrate understanding of dysphagia recommendations  3/15; Educated pt to purpose of visit, impact intubation can have on swallowing, s/s of aspiration, concern if aspiration occurs, rationale for diet recommendation/strategies to reduce risk for aspiration and instruction to notify staff if any signs emerge. Pt stated understanding but will require reinforcement. General  Chart Reviewed: Yes  Behavior/Cognition: Alert; Cooperative  Respiratory Status: O2 via nasual cannula  Communication Observation: Functional  Follows Directions: Simple  Dentition: Adequate  Patient Positioning: Upright in bed  Baseline Vocal Quality: Normal  Volitional Cough: Strong  Prior Dysphagia History: none  Consistencies Administered: Reg solid; Dysphagia Pureed (Dysphagia I); Thin - cup; Thin - straw    Vision/Hearing  Vision  Vision: Impaired  Hearing  Hearing: Within functional limits    Oral Motor Deficits  Oral/Motor  Oral Motor: Within functional limits    Oral Phase Dysfunction  Pt exhibited no difficulty with mastication of cracker, no oral residue. No c/o globus sensation. Indicators of Pharyngeal Phase Dysfunction  Presented pt with puree, thin liquids via cup / straw, including 3 ounces of uninterrupted swallows, as well as a buster cracker.  Pt demonstrated single instance of throat clearing after first sip of water, that pt wished to take from straw. Pt then demonstration no other overt signs of aspiration: no coughing/throat clearing or change in vocal quality. Good labial seal with no anterior loss of liquids. Good swallow movement noted upon palpation of anterior neck. Pt does have yankauer that he is using to suction occasional secretions. Prognosis  Prognosis  Prognosis for safe diet advancement: fair  Individuals consulted  Consulted and agree with results and recommendations: Patient;RN    Education  Patient Education: pt educated to purpose of visit  Patient Education Response: Needs reinforcement  Safety Devices in place: Yes  Type of devices: Call light within reach       Therapy Time  SLP Individual Minutes  Time In: 1467  Time Out: 5512  Minutes: 25     Plan;  Recommended diet: dysphagia III soft and bite sized with thi liquids - if any s/s of aspiration emerge, or there is respiratory decline,  make NPO until further evaluated by SLP  Dc recommendation: f/u with cog/linguistic eval as appropriate  Pt therapy goal: I want to eat, I haven't eaten in days  Pt dc goal: to get home  Nicola Doran M.S./CCC-SLP #[id number]  Pg.  # [phone]  Needs met prior to leaving room, call light within reach, d/w RN Will  This document will serve as a dc summary if pt dc prior to next visit  3/15/2020 1:18 PM

## 2020-03-15 NOTE — PROGRESS NOTES
Transfer note - from ICU to Medical Floor    Briefly this is 72 yo male with a history of  alcoholism, depression, pAF, HTN, CAD hx of NSTEMI, seizure disorder who found down at his assisted living facility to [facility]. He presented with left sided weakness. CT head revealed left parietal bleed. He apparently had seizure like activity in the ED, was cyanotic, respiratory arrest and briefly pulseless required brief round of CPR and intubation. He was then transferred to [facility] for fruther management. Neurosurgery and neurology on board. Admitted to ICU with cEEG for acute nontraumatic right parietal intracranial hemorrhage. On Keppra, repeat CT head stable. Pending MRI brain. Extubated on 3/14. Was on Cardene drip before for hypertensive emergency, which has been resolved. No PCP, can see at resident clinic for colonoscopy and possible hypercoag workup as outpatient if imaging negative. Patient admitted missing his antiepileptic doses prior to admission. The patient was seen and examined. A&O x 4, no new acute complaints. Tolerates diet. Vitals:    03/15/20 1309   BP:    Pulse:    Resp:    Temp: 98.1 °F (36.7 °C)   SpO2:        Physical Exam  · General appearance: alert, appears stated age and cooperative  · Skin: Skin color, texture, turgor normal.   · HEENT: Head: Normocephalic, no lesions, without obvious abnormality. · Pharynx: Dental Hygiene adequate. Normal buccal mucosa. Normal pharynx. · Neck: no adenopathy, no carotid bruit, no JVD, supple, symmetrical, trachea midline and thyroid not enlarged, symmetric, no tenderness/mass/nodules  · Lungs: clear to auscultation bilaterally  · Heart: regular rate and rhythm, S1, S2 normal, no murmur, click, rub or gallop. Pacemaker noted on left chest.   · Abdomen: soft, non-tender; bowel sounds normal; no masses,  no organomegaly  · Extremities: extremities normal, atraumatic, no cyanosis or edema  · Neurologic: CN II-XII grossly intact.   Mental

## 2020-03-15 NOTE — PROGRESS NOTES
4 Eyes Admission Assessment     I agree as the admission nurse that 2 RN's have performed a thorough Head to Toe Skin Assessment on the patient. ALL assessment sites listed below have been assessed on admission. Areas assessed by both nurses:  [x]   Head, Face, and Ears   [x]   Shoulders, Back, and Chest  [x]   Arms, Elbows, and Hands   [x]   Coccyx, Sacrum, and Ischum  [x]   Legs, Feet, and Heels    Patient has bruise that extends the entire area of left forearm, golf ball sized bruise on right abdomen, large area of fungus (verified fungus per patient) on right foot, small area of fungus on left foot        Does the Patient have Skin Breakdown?   No         Elvin Prevention initiated:  No   Wound Care Orders initiated:  No      WOC nurse consulted for Pressure Injury (Stage 3,4, Unstageable, DTI, NWPT, and Complex wounds):  No      Nurse 1 eSignature: Electronically signed by Tegan Cooney RN on 3/15/20 at 6:35 PM EDT    **SHARE this note so that the co-signing nurse is able to place an eSignature**    Nurse 2 eSignature: Electronically signed by Radha Harper RN on 3/15/20 at 7:17 PM EDT

## 2020-03-15 NOTE — PROGRESS NOTES
Progress Note    Admit Date: 3/11/2020  Day: 3/15/2020   Diet: Diet NPO Effective Now    CC: ICH    Interval history: no acute events overnight. IVC filter placed 3/13. At 21:48 yesterday evening bifrontal subclinical seizure lasting 21 seconds. This am Patient alert and oriented x 3. Denies any pain, fevers, chills, chest pain, SOB, abdominal pain. HPI: \"Patient transferred from [facility] because of intracerebral hemorrhage. CT-head revealed left parietal bleed.  He had been found with left-sided weakness at his assisted living facility, andin the ED had apparent seizure activity.  He became cyanotic, appeared to have respiratory arrest and was briefly pulseless.  Very brief CPR performed, and he was intubated promptly. Deb Chin is now seen on mechanical ventilation support and receiving IV sedation with propofol.     Patient arrived on ventilatory support and sedated on propofol due. Patient withdrawing to pain in all extremities and with equal and reactive pupils but otherwise obtunded.  LLE significantly edematous/swollen as well. \"     Medications:     Scheduled Meds:   chlorhexidine  15 mL Mouth/Throat BID    pantoprazole  40 mg Intravenous Daily    sodium chloride flush  10 mL Intravenous 2 times per day    levetiracetam  1,000 mg Intravenous Q12H    sodium chloride flush  10 mL Intravenous 2 times per day     Continuous Infusions:   lactated ringers      niCARdipene (CARDENE) infusion Stopped (03/11/20 1722)    propofol Stopped (03/14/20 1310)     PRN Meds:labetalol, hydrALAZINE, sodium chloride flush, promethazine **OR** ondansetron, sodium chloride flush    Objective:   Vitals:   T-max:  Patient Vitals for the past 8 hrs:   BP Temp Temp src Pulse Resp SpO2   03/15/20 0938 -- -- -- -- -- 95 %   03/15/20 0914 (!) 142/78 -- -- 74 20 94 %   03/15/20 0730 (!) 150/67 -- -- 80 17 98 %   03/15/20 0700 (!) 157/75 -- -- 81 15 99 %   03/15/20 0613 -- -- -- 83 16 98 %   03/15/20 0600 (!) 156/73 -- -- 79 12 97 %   03/15/20 0552 -- -- -- 84 20 98 %   03/15/20 0500 (!) 140/69 -- -- 85 20 96 %   03/15/20 0400 (!) 153/92 98.1 °F (36.7 °C) Oral 69 14 100 %   03/15/20 0300 (!) 174/88 -- -- 86 14 100 %       Intake/Output Summary (Last 24 hours) at 3/15/2020 1009  Last data filed at 3/15/2020 0930  Gross per 24 hour   Intake 1133 ml   Output 1655 ml   Net -522 ml       Review of Systems    Physical Exam  Constitutional:       General: He is not in acute distress.     Appearance: He is not ill-appearing, toxic-appearing or diaphoretic. HENT:      Head: Normocephalic and atraumatic. Eyes:      Extraocular Movements: Extraocular movements intact.      Pupils: Pupils are equal, round, and reactive to light. Neck:      Musculoskeletal: Neck supple. No neck rigidity. Cardiovascular:      Rate and Rhythm: Normal rate and regular rhythm.      Pulses: Normal pulses.      Heart sounds: Normal heart sounds. Pulmonary:      Effort: No respiratory distress.      Breath sounds: Normal breath sounds. No stridor. No wheezing, rhonchi or rales.      Abdominal:      General: Abdomen is flat. Bowel sounds are normal. There is no distension.      Palpations: Abdomen is soft.      Tenderness: There is no abdominal tenderness. There is no guarding. Musculoskeletal: Normal range of motion.         General: Swelling present. No deformity or signs of injury.      Right lower leg: Edema present.      Left lower leg: Edema present.      Comments: Passive range of motion normal, L > R LE edema   Skin:     General: Skin is warm and dry.      Capillary Refill: Capillary refill takes less than 2 seconds.      Coloration: Skin is not jaundiced or pale.      Findings: No bruising, erythema, lesion or rash.    Neurological:   LABS:    CBC:   Recent Labs     03/13/20  0415 03/14/20  0423 03/15/20  0447   WBC 6.9 4.9 6.5   HGB 11.3* 12.0* 13.4*   HCT 34.2* 35.9* 41.8   PLT 97* 126* 134*   MCV 98.3 98.1 100.9*     Renal:    Recent Labs Final Result      1. Stable right parietal intraparenchymal hematoma. Mild adjacent edema. No new or progressive intracranial hemorrhage. XR PELVIS (1-2 VIEWS)   Final Result   1. No acute abnormality. XR FEMUR LEFT (MIN 2 VIEWS)   Final Result   1. No acute osseous abnormality. XR TIBIA FIBULA LEFT (2 VIEWS)   Final Result   1. Soft tissue swelling without acute osseous abnormality. XR FOOT LEFT (MIN 3 VIEWS)   Final Result   1. No acute osseous normality. 2. Extensive soft tissue swelling. MRI BRAIN WO CONTRAST    (Results Pending)       Assessment/Plan:   71 yom with ICH     Acute nontraumatic right parietal intracranial hemorrhage with mild vasogenic edema  - neurosurgery consulted, appreciate recs  - keppra 1000 BID   - repeat CT head stable  - goal SBP <160, hydralazine and labetalol PRN   - elevate head of bed 30 degrees  - no antiplatelets or anticoagulants  - neurochecks q1 hr  - continuous EEG      Bilateral acute and chronic lower extremity DVT  - IVC filter placed 3/13  - cannot AC d/t ICH    Urinary tract infection: r/o, resolved. - Rocephin x 3 days     Hypertensive emergency - resolved  - nicardipine gtt weaned  - SBP < 140    Acute kidney injury - resolved  - baseline creatinine normal  - monitor with fluids     Type 2 NSTEMI - resolved  - trop 0.06 x 3, will stop trending     Chronic:  Seizure disorder - takes Keppra 500mg BID at home  Afib - AC contraindicated due to fall risk, seizure disorder, brain bleed  CAD s/p NSTEMI - continue statin, Toprol  Depression - holding trazodone and Zoloft  GERD - continue Protonix  EtOH abuse - CIWA assessment, solomon pack     Code Status: Full code  FEN: NPO,   PPX: Protonix 40 IV daily, SCDs  Vikki Sever, MD, PGY-2  03/15/20  10:09 AM    This patient will be staffed and discussed with Dr. Jane Murray  Patient examined, findings as discussed with Dr. Fred Dewitt. Agree with assessment and plan as above.   Respiratory failure resolved. Mentation intact. Episode of subclinical seizure self-limiting, not requiring additional treatment. Continue EEG monitoring. Stable for transfer to neurosurgical floor.

## 2020-03-15 NOTE — PROGRESS NOTES
Dubon catheter removed.  Patient experienced a lot of discomfort  With removal. Patient is currently urinating in the urinal.

## 2020-03-15 NOTE — PROGRESS NOTES
Patient transferred to room 4453. Report given to jamaica TURNER. Patient transferred with belongings. EEG re set up in new room.

## 2020-03-15 NOTE — PROGRESS NOTES
Progress Note    Updates  Patient feels well. No complaints. Subclinical seizure last evening.       Past Medical History:   Diagnosis Date    Alcoholism [facility])     Cardiac arrest ([facility] [address] 08/2016    PAF (paroxysmal atrial fibrillation) ([facility])      Current Facility-Administered Medications:     lactated ringers infusion, , Intravenous, Continuous, Chico Verde MD, Last Rate: 75 mL/hr at 03/15/20 1021    chlorhexidine (PERIDEX) 0.12 % solution 15 mL, 15 mL, Mouth/Throat, BID, Kaitlyn June MD, 15 mL at 03/15/20 1021    labetalol (NORMODYNE;TRANDATE) injection syringe 10 mg, 10 mg, Intravenous, Q4H PRN, Chelle Garcia MD    hydrALAZINE (APRESOLINE) injection 10 mg, 10 mg, Intravenous, Q4H PRN, Chelle Garcia MD, 10 mg at 03/15/20 0330    pantoprazole (PROTONIX) injection 40 mg, 40 mg, Intravenous, Daily, Mey Amanda MD, 40 mg at 03/15/20 0920    niCARdipine (CARDENE) 25 mg in sodium chloride 0.9 % 250 mL infusion, 5 mg/hr, Intravenous, Continuous, HEIDI Nascimento CNP, Stopped at 03/11/20 1722    sodium chloride flush 0.9 % injection 10 mL, 10 mL, Intravenous, 2 times per day, Mey Amanda MD, 10 mL at 03/15/20 3829    sodium chloride flush 0.9 % injection 10 mL, 10 mL, Intravenous, PRN, Mey Amanda MD    promethazine (PHENERGAN) tablet 12.5 mg, 12.5 mg, Oral, Q6H PRN **OR** ondansetron (ZOFRAN) injection 4 mg, 4 mg, Intravenous, Q6H PRN, Mey Amanda MD    propofol injection, 10 mcg/kg/min, Intravenous, Titrated, Mey Amanda MD, Stopped at 03/14/20 1310    levETIRAcetam (KEPPRA) 1,000 mg in sodium chloride 0.9 % 100 mL IVPB, 1,000 mg, Intravenous, Q12H, HEIDI Nascimento CNP, Stopped at 03/15/20 0935    sodium chloride flush 0.9 % injection 10 mL, 10 mL, Intravenous, 2 times per day, Mey Amanda MD, 10 mL at 03/15/20 9335    sodium chloride flush 0.9 % injection 10 mL, 10 mL, Intravenous, PRN, Mey Amanda MD    Exam  Blood pressure (!) 142/78, pulse 74, temperature 97.5 °F (36.4 °C), temperature source Oral, resp. rate 20, height 5' 9\" (1.753 m), weight 168 lb 6.9 oz (76.4 kg), SpO2 95 %. Constitutional                          Vital signs: BP, HR, and RR reviewed            General alert, no distress  Eyes: unable to visualize the fundi  Cardiovascular: pulses symmetric in all 4 extremities. + peripheral edema. Psychiatric: cooperative with examination, no psychotic behavior noted. Neurologic  Mental status:   orientation to person, place, time. Attention intact as able to attend well to the exam                Language fluent in conversation. No aphasia. Comprehension intact; follows simple commands  Cranial nerves:   CN2: visual fields full   CN 3,4,6: extraocular muscles intact  CN7: no facial weakness, no dysarthria. CN8: hearing grossly intact  CN12: tongue midline with protrusion  Strength: a bit of LLE/RUE weakness, otherwise no gross focal paresis. Sensory: light touch intact in all 4 extremities. Cerebellar/coordination: seems to be a bit clumsy in his BUE, R>L. Tone: normal in all 4 extremities  Gait: deferred at this time for safety. ROS  Constitutional- No weight loss or fevers  Eyes- No diplopia. No photophobia. Ears/nose/throat- No dysphagia. No Dysarthria  Cardiovascular- No palpitations. No chest pain  Respiratory- No dyspnea. No Cough  Gastrointestinal- No Abdominal pain. No Vomiting. Genitourinary- No incontinence. No urinary retention  Musculoskeletal- No myalgia. No arthralgia  Skin- No rash. No easy bruising. Psychiatric- No depression. No anxiety  Endocrine- No diabetes. No thyroid issues. Hematologic- No bleeding difficulty. No fatigue  Neurologic- No weakness. No Headache. Labs  Glucose 78  Na 140  K 4.1  BUN 8  Creatinine 0.7    WBC 6.5K  Hg 13.4  Platelets 105    LDL 58  HgA1c 5.5    Studies  CT head w/o 3/11/20  Stable R parietal hematoma.   Mild adjacent edema.       CTV head w/wo 3/13/20  Limited. No cortical venous thrombosis noted on the R.       CTA head/neck 3/11/20  No significant stenosis, occlusions, or malformations.       BLE venous US 3/12/20  Acute R/L DVTs. cvEEG  03/14/2020    22:00pm to 09:00am on 03/15/2020  SEIZURES:  No further seizures during this epoch   INTERICTAL:  Frequent bifrontal sharps  BACKGROUND:  Abundant generalized alpha/beta frequencies      03/14/2020   10:00am to 22:00pm  SEIZURES:  21:48pm bifrontal subclinical seizure lasting 21 seconds. INTERICTAL:  Frequent bifrontal sharps  BACKGROUND:  Abundant generalized alpha/beta frequencies     Impression  1. Suspected seizure in the setting of acute R parietal ICH. Subsequent imaging is stable. Clinically the patient is stable. Subclinical seizure was reported last evening on cvEEG.       S/p IVC filter. Recommendations  1. Continue Keppra 1000 mg BID. 2.  Continue cvEEG for now. May be able to come off tomorrow if unremarkable. 3.  MRI brain w/o pending.       Praful Verma NP  [address] [place] [address] [address] Neurology    A copy of this note was provided for Dr Sheldon Morales MD

## 2020-03-15 NOTE — PLAN OF CARE
Problem: Restraint Use - Nonviolent/Non-Self-Destructive Behavior:  Goal: Absence of restraint indications  Outcome: Completed     Problem: Restraint Use - Nonviolent/Non-Self-Destructive Behavior:  Goal: Absence of restraint-related injury  Outcome: Completed     Problem: HEMODYNAMIC STATUS  Goal: Patient has stable vital signs and fluid balance  Outcome: Ongoing     Problem: ACTIVITY INTOLERANCE/IMPAIRED MOBILITY  Goal: Mobility/activity is maintained at optimum level for patient  Outcome: Ongoing     Problem: Falls - Risk of:  Goal: Will remain free from falls  Outcome: Ongoing  Note: Pt is free of falls at this time. Bed wheels are locked, bed alarm is on, bed is in lowest position. Call light is within reach. Will cont to monitor. Problem: Pain:  Goal: Pain level will decrease  Outcome: Ongoing  Note:   RN monitoring pt for pain q4h and more frequently as needed. Pain scale 0-10 educated to pt and CPOT used if critically ill pt is unable to answer. RN educated pt on reporting any new onset of pain or sudden increase. PRN pain medication reviewed with pt. Will continue to monitor for changes.        Problem: Nutrition  Goal: Optimal nutrition therapy  Outcome: Not Met This Shift

## 2020-03-15 NOTE — PROGRESS NOTES
CONTINUOUS EEG    Name:  Todd Oneill Record Number:  [id number]  Age: 71 y.o. Gender: male  : 1950  Today's Date:  3/15/2020  Room:  5157/5173-78  Vital Signs   /72   Pulse 93   Temp 98.1 °F (36.7 °C) (Oral)   Resp 19   Ht 5' 9\" (1.753 m)   Wt 168 lb 6.9 oz (76.4 kg)   SpO2 95%   BMI 24.87 kg/m²       Patient currently on continuous EEG monitoring. All EEG leads are currently in place with no current issues. Verified [employer] connection via team viewer. Checked in with patient RN for current plan of care. Comments: Continue monitoring. All leads within 10K limit.     Electronically signed by Wellington Garces on 3/15/2020 at 6:01 PM

## 2020-03-16 LAB
ANION GAP SERPL CALCULATED.3IONS-SCNC: 18 MMOL/L (ref 3–16)
BASOPHILS ABSOLUTE: 0 K/UL (ref 0–0.2)
BASOPHILS RELATIVE PERCENT: 0.7 %
BUN BLDV-MCNC: 8 MG/DL (ref 7–20)
CALCIUM SERPL-MCNC: 8.4 MG/DL (ref 8.3–10.6)
CHLORIDE BLD-SCNC: 102 MMOL/L (ref 99–110)
CO2: 18 MMOL/L (ref 21–32)
CREAT SERPL-MCNC: 0.8 MG/DL (ref 0.8–1.3)
EOSINOPHILS ABSOLUTE: 0.1 K/UL (ref 0–0.6)
EOSINOPHILS RELATIVE PERCENT: 1.5 %
GFR AFRICAN AMERICAN: >60
GFR NON-AFRICAN AMERICAN: >60
GLUCOSE BLD-MCNC: 98 MG/DL (ref 70–99)
HCT VFR BLD CALC: 36.3 % (ref 40.5–52.5)
HEMOGLOBIN: 11.8 G/DL (ref 13.5–17.5)
LYMPHOCYTES ABSOLUTE: 0.6 K/UL (ref 1–5.1)
LYMPHOCYTES RELATIVE PERCENT: 10.2 %
MAGNESIUM: 1.3 MG/DL (ref 1.8–2.4)
MCH RBC QN AUTO: 31.6 PG (ref 26–34)
MCHC RBC AUTO-ENTMCNC: 32.6 G/DL (ref 31–36)
MCV RBC AUTO: 97.1 FL (ref 80–100)
MONOCYTES ABSOLUTE: 1.4 K/UL (ref 0–1.3)
MONOCYTES RELATIVE PERCENT: 24.7 %
NEUTROPHILS ABSOLUTE: 3.5 K/UL (ref 1.7–7.7)
NEUTROPHILS RELATIVE PERCENT: 62.9 %
PDW BLD-RTO: 18.3 % (ref 12.4–15.4)
PLATELET # BLD: 157 K/UL (ref 135–450)
PMV BLD AUTO: 7.6 FL (ref 5–10.5)
POTASSIUM SERPL-SCNC: 3.3 MMOL/L (ref 3.5–5.1)
RBC # BLD: 3.74 M/UL (ref 4.2–5.9)
SODIUM BLD-SCNC: 138 MMOL/L (ref 136–145)
WBC # BLD: 5.6 K/UL (ref 4–11)

## 2020-03-16 PROCEDURE — 6370000000 HC RX 637 (ALT 250 FOR IP): Performed by: STUDENT IN AN ORGANIZED HEALTH CARE EDUCATION/TRAINING PROGRAM

## 2020-03-16 PROCEDURE — 2580000003 HC RX 258: Performed by: STUDENT IN AN ORGANIZED HEALTH CARE EDUCATION/TRAINING PROGRAM

## 2020-03-16 PROCEDURE — 92526 ORAL FUNCTION THERAPY: CPT

## 2020-03-16 PROCEDURE — 6360000002 HC RX W HCPCS: Performed by: STUDENT IN AN ORGANIZED HEALTH CARE EDUCATION/TRAINING PROGRAM

## 2020-03-16 PROCEDURE — C9113 INJ PANTOPRAZOLE SODIUM, VIA: HCPCS | Performed by: STUDENT IN AN ORGANIZED HEALTH CARE EDUCATION/TRAINING PROGRAM

## 2020-03-16 PROCEDURE — 95813 EEG EXTND MNTR 61-119 MIN: CPT

## 2020-03-16 PROCEDURE — 80048 BASIC METABOLIC PNL TOTAL CA: CPT

## 2020-03-16 PROCEDURE — 2500000003 HC RX 250 WO HCPCS: Performed by: STUDENT IN AN ORGANIZED HEALTH CARE EDUCATION/TRAINING PROGRAM

## 2020-03-16 PROCEDURE — 83735 ASSAY OF MAGNESIUM: CPT

## 2020-03-16 PROCEDURE — 2060000000 HC ICU INTERMEDIATE R&B

## 2020-03-16 PROCEDURE — 36415 COLL VENOUS BLD VENIPUNCTURE: CPT

## 2020-03-16 PROCEDURE — 6370000000 HC RX 637 (ALT 250 FOR IP): Performed by: INTERNAL MEDICINE

## 2020-03-16 PROCEDURE — 85025 COMPLETE CBC W/AUTO DIFF WBC: CPT

## 2020-03-16 RX ORDER — LANOLIN ALCOHOL/MO/W.PET/CERES
400 CREAM (GRAM) TOPICAL ONCE
Status: COMPLETED | OUTPATIENT
Start: 2020-03-16 | End: 2020-03-16

## 2020-03-16 RX ORDER — POTASSIUM CHLORIDE 20 MEQ/1
40 TABLET, EXTENDED RELEASE ORAL ONCE
Status: COMPLETED | OUTPATIENT
Start: 2020-03-16 | End: 2020-03-16

## 2020-03-16 RX ORDER — POTASSIUM CHLORIDE 20 MEQ/1
40 TABLET, EXTENDED RELEASE ORAL PRN
Status: DISCONTINUED | OUTPATIENT
Start: 2020-03-16 | End: 2020-03-18

## 2020-03-16 RX ORDER — POTASSIUM CHLORIDE 7.45 MG/ML
10 INJECTION INTRAVENOUS PRN
Status: DISCONTINUED | OUTPATIENT
Start: 2020-03-16 | End: 2020-03-18

## 2020-03-16 RX ORDER — AMLODIPINE BESYLATE 5 MG/1
5 TABLET ORAL DAILY
Status: DISCONTINUED | OUTPATIENT
Start: 2020-03-16 | End: 2020-03-17

## 2020-03-16 RX ADMIN — HYDRALAZINE HYDROCHLORIDE 10 MG: 20 INJECTION INTRAMUSCULAR; INTRAVENOUS at 00:27

## 2020-03-16 RX ADMIN — Medication 15 ML: at 11:05

## 2020-03-16 RX ADMIN — Medication 15 ML: at 20:21

## 2020-03-16 RX ADMIN — HYDRALAZINE HYDROCHLORIDE 10 MG: 20 INJECTION INTRAMUSCULAR; INTRAVENOUS at 21:30

## 2020-03-16 RX ADMIN — HYDRALAZINE HYDROCHLORIDE 10 MG: 20 INJECTION INTRAMUSCULAR; INTRAVENOUS at 09:22

## 2020-03-16 RX ADMIN — Medication 400 MG: at 09:23

## 2020-03-16 RX ADMIN — LEVETIRACETAM 1000 MG: 100 INJECTION, SOLUTION INTRAVENOUS at 21:26

## 2020-03-16 RX ADMIN — LEVETIRACETAM 1000 MG: 100 INJECTION, SOLUTION INTRAVENOUS at 09:23

## 2020-03-16 RX ADMIN — PANTOPRAZOLE SODIUM 40 MG: 40 INJECTION, POWDER, FOR SOLUTION INTRAVENOUS at 09:22

## 2020-03-16 RX ADMIN — AMLODIPINE BESYLATE 5 MG: 5 TABLET ORAL at 11:05

## 2020-03-16 RX ADMIN — SODIUM CHLORIDE, POTASSIUM CHLORIDE, SODIUM LACTATE AND CALCIUM CHLORIDE: 600; 310; 30; 20 INJECTION, SOLUTION INTRAVENOUS at 00:27

## 2020-03-16 RX ADMIN — SODIUM CHLORIDE, POTASSIUM CHLORIDE, SODIUM LACTATE AND CALCIUM CHLORIDE: 600; 310; 30; 20 INJECTION, SOLUTION INTRAVENOUS at 14:43

## 2020-03-16 RX ADMIN — Medication 10 ML: at 21:30

## 2020-03-16 RX ADMIN — LABETALOL 20 MG/4 ML (5 MG/ML) INTRAVENOUS SYRINGE 10 MG: at 04:33

## 2020-03-16 RX ADMIN — POTASSIUM CHLORIDE 40 MEQ: 20 TABLET, EXTENDED RELEASE ORAL at 09:22

## 2020-03-16 RX ADMIN — Medication 10 ML: at 09:32

## 2020-03-16 ASSESSMENT — PAIN SCALES - GENERAL
PAINLEVEL_OUTOF10: 0

## 2020-03-16 ASSESSMENT — ENCOUNTER SYMPTOMS
APNEA: 0
ABDOMINAL PAIN: 0
VOICE CHANGE: 0
VOMITING: 0
PHOTOPHOBIA: 0
CHEST TIGHTNESS: 0
CHOKING: 0
SHORTNESS OF BREATH: 0
NAUSEA: 0
TROUBLE SWALLOWING: 0
ABDOMINAL DISTENTION: 0
WHEEZING: 0
COUGH: 0
STRIDOR: 0
CONSTIPATION: 0
DIARRHEA: 0

## 2020-03-16 NOTE — PROGRESS NOTES
Speech Language Pathology  Facility/Department: [place] [address] [facility]  Dysphagia Daily Treatment Note    NAME: Mel Menjivar  : 1950  MRN: [id number]    Patient Diagnosis(es):   Patient Active Problem List    Diagnosis Date Noted    Intracranial hemorrhage ([facility] 2020    Acute respiratory failure with hypoxia and hypercapnia ([facility] 2020    Essential hypertension 2020    Acute encephalopathy     Hypokalemia     New onset seizure ([facility] 2020    GERD (gastroesophageal reflux disease) 2019    HTN (hypertension), benign 2019    Depression 2019    Elevated d-dimer 2019    NSTEMI (non-ST elevated myocardial infarction) ([facility] 2019     Allergies: No Known Allergies            CT Chest / Abdomen (3/15/20)      I lateral effusions with bibasilar airspace disease or dependent atelectasis.     1 cm nodule seen posteriorly in superior segment of left lower lobe. Following the Fleischner Society 2017 guidelines for single solid nodules >8 mm, if patient is low risk then consider CT at 3 months, PET/CT, or tissue sampling.  If patient is high    risk, then consider CT at 3 months, PET/CT, or tissue sampling.  qzxv       Fatty infiltration of liver.       Cholelithiasis.       Trace ascites.       Slight wedging of T12 and L1 vertebral bodies, likely chronic but should be correlated clinically.       Otherwise no acute abnormality or evidence of malignancy identified given limitations of noncontrast evaluation. Previous MBS none    Chart reviewed. Medical Diagnosis: ICH  Treatment Diagnosis: dysphagia    BSE Impression (3/15/20)  Pt alert, oriented, follows commands and answered questions appropriately, though verbose. Oral structures grossly intact, no asymmetry noted. Pt able to cough and swallow on command. Presented pt with puree, thin liquids via cup / straw, including 3 ounces of uninterrupted swallows, as well as a buster cracker.  Pt aspiration, concern if aspiration occurs, rationale for diet recommendation/strategies to reduce risk for aspiration and instruction to notify staff if any signs emerge. Pt stated understanding but will require reinforcement. 3/16: Educated pt to role of SLP, purpose of visit, impact CVA can have on swallow, s/s and risks associated with aspiration, diet consistency options, opportunity to self-select items on regular diet, current diet, and compensatory swallow strategies. Pt continued to endorse desire to remain on current diet consistency. Pt verbalized comprehension of all information but will require reinforcement d/t cognitive deficits. Goal not met - continue to target.       Patient/Family/Caregiver Education:  As above. Educated pt to cognitive linguistic changes that may emerge s/p ICH and seizures. Pt endorsed difficulty with recall since \"fell off a ladder\" 4-5 years ago - pt admitted to [facility] in August 2016 for frequent falls / Afib. At that time, pt endorsed falling off ladder to medical personnel but neighbors indicated this was untrue. Pt endorsed individuals at his assisted living facility \"see me and turn the other way\" due to his \"memory. \" Pt also reported having a book that he keeps important information in at home to assist with recall. Pt denied ever seeing ST for cognition and unclear what pt's actual cognitive-linguistic baseline is. Pt perseverative on buying own house / leaving assisted living and demonstrated no insight into potential impact current deficits may have on function and safety independently. Educated pt to recommendation for SLP evaluation to determine current status and attempt to address current cognitive-linguistic deficits. Pt in agreement. Compensatory Strategies:  Upright as possible for all oral intake  Small bites/sips          Plan:  Continued daily Dysphagia treatment with goals per  plan of care.   Diet recommendations: continue dysphagia soft and bite sized (per

## 2020-03-16 NOTE — PLAN OF CARE
Problem: HEMODYNAMIC STATUS  Goal: Patient has stable vital signs and fluid balance  Description: Vital signs monitored routinely. NSR to v paced on tele. Normotensive. Will administer prn antihypertensive medication if criteria is met. Will continue to monitor and reassess. Outcome: Met This Shift   VSS this shift. Neuro checks WNL, NIHSS WNL. See doc flow sheets. Vitals:    03/16/20 0900 03/16/20 1020 03/16/20 1100 03/16/20 1500   BP: (!) 175/94 (!) 164/83 (!) 151/73 (!) 155/96   Pulse: 90 86 96 93   Resp: 22 18 16   Temp: 98.6 °F (37 °C)  98.7 °F (37.1 °C) 97.8 °F (36.6 °C)   TempSrc: Oral  Oral Oral   SpO2: 94%  96% 95%   Weight:       Height:          Problem: Mental Status - Impaired:  Goal: Mental status will improve  Description: Mental status will improve. Patient is alert and oriented x4. Patient endorses some short term memory issues but per patient this is not new. Will continue to monitor and reassess. Outcome: Met This Shift   Patient is A/O x4, short term memory loss, not new onset. Problem: Falls - Risk of:  Goal: Will remain free from falls  Description: Patient will remain free of falls throughout the duration of the shift. Bed locked in lowest position. Non skid socks on. Bed and chair alarm activated. Call light with reach and patient has been using it appropriately. Patient rounded on frequently. Room door open at all times. 3/4 side rails up. Will continue to monitor and reassess. Outcome: Met This Shift   Bed alarm remains active, bed in low/locked position, non-skid footwear in place, and call light in reach. Will continue fall risk protocol.

## 2020-03-16 NOTE — PROGRESS NOTES
Genitourinary: Negative for difficulty urinating and dysuria. Skin: Negative for rash and wound. Neurological: Negative for dizziness, weakness and light-headedness. Psychiatric/Behavioral: Negative for agitation and behavioral problems. Physical Exam  Vitals signs and nursing note reviewed. Constitutional:       General: He is not in acute distress. Appearance: He is well-developed. He is obese. He is not ill-appearing, toxic-appearing or diaphoretic. HENT:      Head: Normocephalic and atraumatic. Comments: EEG lead and dressing intact  Eyes:      General: No scleral icterus. Extraocular Movements: Extraocular movements intact. Conjunctiva/sclera: Conjunctivae normal.      Pupils: Pupils are equal, round, and reactive to light. Neck:      Musculoskeletal: Normal range of motion and neck supple. Cardiovascular:      Rate and Rhythm: Normal rate and regular rhythm. Pulses: Normal pulses. Heart sounds: Normal heart sounds. No murmur. No friction rub. No gallop. Pulmonary:      Effort: Pulmonary effort is normal. No respiratory distress. Breath sounds: Normal breath sounds. No wheezing or rales. Chest:      Chest wall: No tenderness. Abdominal:      General: Bowel sounds are normal. There is no distension. Palpations: Abdomen is soft. Tenderness: There is no abdominal tenderness. There is no guarding. Musculoskeletal: Normal range of motion. General: No swelling, tenderness, deformity or signs of injury. Skin:     General: Skin is warm and dry. Coloration: Skin is not jaundiced. Findings: Bruising (left arm ) present. Neurological:      General: No focal deficit present. Mental Status: He is alert and oriented to person, place, and time. Mental status is at baseline. Cranial Nerves: No cranial nerve deficit. Sensory: No sensory deficit.    Psychiatric:         Behavior: Behavior normal.         LABS:    CBC: extremity DVT  - IVC filter placed 3/13  - cannot AC d/t ICH for 2 weeks   - Unknown etiology, CT C/A/P done ruled out malignancy, notice a 1 cm nodule on LLL lung. Will need a repeat CT chest in 3 months for monitoring. No history of smoking.   - Last cscope 3 years ago with some benign polyps as per patient, will need a repeat in 3-5 years   - Will benefit from hypercoag study outpatient     Essential hypertension   Came in with hypertension emergency, resolved. Was not on any medications at home   - Start Norvasc 3/16  - Goal <160/90     Lung nodule   1 cm nodule notice on CT C/A/P. Will need a repeat CT chest in 3 months  No history of smoking     Other resolved hospital problems   Urinary tract infection: r/o, resolved.    - Rocephin x 3 days      Hypertensive emergency - resolved  - nicardipine gtt weaned  - SBP < 140     Acute kidney injury - resolved  - baseline creatinine normal  - monitor with fluids     Type 2 NSTEMI - resolved  - trop 0.06 x 3, will stop trending    Acute metabolic encephalopathy secondary to the above, resolved      Chronic problems  Seizure disorder - takes Keppra 500mg BID at home  Afib - AC contraindicated due to fall risk, seizure disorder, brain bleed  CAD s/p NSTEMI - continue statin, Toprol  Depression - holding trazodone and Zoloft  GERD - continue Protonix  EtOH abuse - CARLEE nguyễn, solomon pack     Code Status: Full code   FEN: DIET DYSPHAGIA SOFT AND BITE-SIZED;  PPX: SCDs   DISPO: ICU     Ayaz Garcia MD, PGY-1  03/16/20  9:11 AM    This patient has been staffed and discussed with Jessy De La Cruz MD.

## 2020-03-16 NOTE — PROGRESS NOTES
Physical Therapy/Occupational therapy  HOLD    Orders received, chart reviewed. Pt on continuous EEG. Spoke with RN who is unsure when pt will be off. Will f/u 3/17.     Miguel Blum, PT, DPT  [id number]  Valeria Jack OTR/L #[id number]

## 2020-03-16 NOTE — PROCEDURES
Continuous EEG monitoring record    Patient name: Trey Chavarria    START: 03/15/2020 @ 09:00am  END: 03/16/2020 @ 08:00am      Electroencephalographer: Too Bui MD PhD      CLINICAL DETAILS:  This EEG was performed on this 71 y. o.yo male admitted for Altered mental Status and concer for subclinical seizures      TECHNICAL DETAILS:  Continuous video-EEG monitoring was performed with 27 surface scalp electrodes placed according to the International 10-20 electrode placement system, using a 32-channel [employer] headbox. All EEG and video information was acquired digitally, including the use of automated spike and seizure detection software to detect epileptiform activity. An event button was also available to be depressed during clinical events. 03/16/2020  00:00am to 08:00am  SEIZURES:  None   INTERICTAL:  Frequent bifrontal sharps  PUSHBUTTONS:  None  BACKGROUND:  Abundant generalized alpha/beta frequencies   EKG:  regular      03/15/2020   09:00am to 23:59pm  SEIZURES:  None   INTERICTAL:  Frequent bifrontal sharps  PUSHBUTTONS:  None  BACKGROUND:  Abundant generalized alpha/beta frequencies   EKG:  regular    CLINICAL INTERPRETATION:  This was an abnormal tracing for age and state due to a mild to moderate generalized slow wave abnormality indicating diffuse cerebral dysfunction which can be of multiple causes, including structural, or vascular abnormalities, toxic/metabolic conditions, hydrocephalus, or postictal conditions. Frequent bifrontal sharps indicate a generalized cortical hyperexcitability that may be associated with seizures. Clinical correlation is advised.         Too Bui MD PhD

## 2020-03-16 NOTE — PROGRESS NOTES
Neurology Follow Up  Note    Updates:  Seen for seizure. Now extubated and following commands had electrographic seizures on 3/14/2020 remains on CVEEG. ROS:  Constitutional: denies fatigue, malaise  Eyes: denies any vision changes,  Musculoskeletal: denies any pain or decreased ROM, no functional deficit. Neurological: denies headache, numbness or tingling, speech problems. Exam:  Blood pressure (!) 175/94, pulse 90, temperature 98.6 °F (37 °C), temperature source Oral, resp. rate 22, height 5' 9\" (1.753 m), weight 168 lb 6.9 oz (76.4 kg), SpO2 94 %. Constitutional    Vital signs: BP, HR, and RR reviewed   General Sedated on mechanical ventilation , well-nourished  Eyes: fundoscopic exam not visualized. .   Cardiovascular: pulses symmetric in all 4 extremities. No peripheral edema. Psychiatric: does not cooperate with examination, no  psychotic behavior noted. Neurologic   Mental status:   orientation to person, place and time. General fund of knowledge grossly intact   Memory grossly intact   Attention intact as able to attend well to the exam     Language fluent in conversation   Comprehension intact; follows simple commands  Cranial nerves:   CN2: Visual Fields full to threat. CN 3,4,6: extraocular muscles tracks from side to side   CN5: facial sensation symmetric   CN7:face symmetric without dysarthria,   CN8: hearing grossly intact  CN9: palate elevated symmetrically  CN11: trap full strength on shoulder shrug  CN12: tongue midline with protrusion  Strength:  Good strength in all 4 extremities   Deep tendon reflexes: normal in all 4 extremities  Sensory: light touch intact in all 4 extremities.  Cerebellar/coordination: finger nose finger normal without ataxia  Tone: normal in all 4 extremities  Gait: deferred due to safety concerns     Labs:    CBC:   Lab Results   Component Value Date    WBC 5.6 03/16/2020    RBC 3.74 03/16/2020    HGB 11.8 03/16/2020    HCT 36.3 03/16/2020    MCV bowel gas pattern. Catheter projects over the pelvis. No IVC filter is visualized. VL Extremity Venous Bilateral   Final Result      CT head without contrast   Final Result      1. Stable right parietal intraparenchymal hematoma. Mild adjacent edema. No new or progressive intracranial hemorrhage. XR PELVIS (1-2 VIEWS)   Final Result   1. No acute abnormality. XR FEMUR LEFT (MIN 2 VIEWS)   Final Result   1. No acute osseous abnormality. XR TIBIA FIBULA LEFT (2 VIEWS)   Final Result   1. Soft tissue swelling without acute osseous abnormality. XR FOOT LEFT (MIN 3 VIEWS)   Final Result   1. No acute osseous normality. 2. Extensive soft tissue swelling. MRI BRAIN WO CONTRAST    (Results Pending)         CVEE2020  00:00am to 08:00am  SEIZURES:  None   INTERICTAL:  Frequent bifrontal sharps  PUSHBUTTONS:  None  BACKGROUND:  Abundant generalized alpha/beta frequencies   EKG:  regular        03/15/2020   09:00am to 23:59pm  SEIZURES:  None   INTERICTAL:  Frequent bifrontal sharps  PUSHBUTTONS:  None  BACKGROUND:  Abundant generalized alpha/beta frequencies   EKG:  regular    2020    22:00pm to 09:00am on 03/15/2020  SEIZURES:  No further seizures during this epoch   INTERICTAL:  Frequent bifrontal sharps  PUSHBUTTONS:  None  BACKGROUND:  Abundant generalized alpha/beta frequencies   EKG:  regular      2020   10:00am to 22:00pm  SEIZURES:  21:48pm bifrontal subclinical seizure lasting 21 seconds.      INTERICTAL:  Frequent bifrontal sharps  PUSHBUTTONS:  None  BACKGROUND:  Abundant generalized alpha/beta frequencies   EKG:  regular       2020 00:00am to 08:00am  SEIZURES:  None  INTERICTAL:  None  PUSHBUTTONS:  None  BACKGROUND:  Abundant generalized alpha/beta frequencies   Frequent brief runs of generalized rhythmic 1.5-2 Hz delta activity   EKG:  regular        2020 08:00am to

## 2020-03-17 LAB
ANION GAP SERPL CALCULATED.3IONS-SCNC: 19 MMOL/L (ref 3–16)
ANISOCYTOSIS: ABNORMAL
BASOPHILS ABSOLUTE: 0.1 K/UL (ref 0–0.2)
BASOPHILS RELATIVE PERCENT: 1 %
BUN BLDV-MCNC: 6 MG/DL (ref 7–20)
CALCIUM SERPL-MCNC: 8.8 MG/DL (ref 8.3–10.6)
CHLORIDE BLD-SCNC: 100 MMOL/L (ref 99–110)
CO2: 21 MMOL/L (ref 21–32)
CREAT SERPL-MCNC: 0.8 MG/DL (ref 0.8–1.3)
EOSINOPHILS ABSOLUTE: 0.1 K/UL (ref 0–0.6)
EOSINOPHILS RELATIVE PERCENT: 2 %
GFR AFRICAN AMERICAN: >60
GFR NON-AFRICAN AMERICAN: >60
GLUCOSE BLD-MCNC: 99 MG/DL (ref 70–99)
HCT VFR BLD CALC: 37.7 % (ref 40.5–52.5)
HEMOGLOBIN: 12.8 G/DL (ref 13.5–17.5)
LYMPHOCYTES ABSOLUTE: 0.8 K/UL (ref 1–5.1)
LYMPHOCYTES RELATIVE PERCENT: 14 %
MAGNESIUM: 1.1 MG/DL (ref 1.8–2.4)
MCH RBC QN AUTO: 32.4 PG (ref 26–34)
MCHC RBC AUTO-ENTMCNC: 33.9 G/DL (ref 31–36)
MCV RBC AUTO: 95.7 FL (ref 80–100)
MONOCYTES ABSOLUTE: 1.5 K/UL (ref 0–1.3)
MONOCYTES RELATIVE PERCENT: 27 %
NEUTROPHILS ABSOLUTE: 3.1 K/UL (ref 1.7–7.7)
NEUTROPHILS RELATIVE PERCENT: 56 %
PDW BLD-RTO: 18 % (ref 12.4–15.4)
PLATELET # BLD: 164 K/UL (ref 135–450)
PMV BLD AUTO: 8.4 FL (ref 5–10.5)
POTASSIUM SERPL-SCNC: 3.6 MMOL/L (ref 3.5–5.1)
RBC # BLD: 3.94 M/UL (ref 4.2–5.9)
SCHISTOCYTES: ABNORMAL
SODIUM BLD-SCNC: 140 MMOL/L (ref 136–145)
STOMATOCYTES: ABNORMAL
TEAR DROP CELLS: ABNORMAL
WBC # BLD: 5.6 K/UL (ref 4–11)

## 2020-03-17 PROCEDURE — 97166 OT EVAL MOD COMPLEX 45 MIN: CPT

## 2020-03-17 PROCEDURE — 6360000002 HC RX W HCPCS: Performed by: STUDENT IN AN ORGANIZED HEALTH CARE EDUCATION/TRAINING PROGRAM

## 2020-03-17 PROCEDURE — 97535 SELF CARE MNGMENT TRAINING: CPT

## 2020-03-17 PROCEDURE — 2500000003 HC RX 250 WO HCPCS: Performed by: STUDENT IN AN ORGANIZED HEALTH CARE EDUCATION/TRAINING PROGRAM

## 2020-03-17 PROCEDURE — 97530 THERAPEUTIC ACTIVITIES: CPT

## 2020-03-17 PROCEDURE — 85025 COMPLETE CBC W/AUTO DIFF WBC: CPT

## 2020-03-17 PROCEDURE — 95813 EEG EXTND MNTR 61-119 MIN: CPT

## 2020-03-17 PROCEDURE — 6370000000 HC RX 637 (ALT 250 FOR IP): Performed by: STUDENT IN AN ORGANIZED HEALTH CARE EDUCATION/TRAINING PROGRAM

## 2020-03-17 PROCEDURE — 97163 PT EVAL HIGH COMPLEX 45 MIN: CPT

## 2020-03-17 PROCEDURE — 92526 ORAL FUNCTION THERAPY: CPT

## 2020-03-17 PROCEDURE — 80048 BASIC METABOLIC PNL TOTAL CA: CPT

## 2020-03-17 PROCEDURE — 97116 GAIT TRAINING THERAPY: CPT

## 2020-03-17 PROCEDURE — 2580000003 HC RX 258: Performed by: STUDENT IN AN ORGANIZED HEALTH CARE EDUCATION/TRAINING PROGRAM

## 2020-03-17 PROCEDURE — C9113 INJ PANTOPRAZOLE SODIUM, VIA: HCPCS | Performed by: STUDENT IN AN ORGANIZED HEALTH CARE EDUCATION/TRAINING PROGRAM

## 2020-03-17 PROCEDURE — 36415 COLL VENOUS BLD VENIPUNCTURE: CPT

## 2020-03-17 PROCEDURE — 83735 ASSAY OF MAGNESIUM: CPT

## 2020-03-17 PROCEDURE — 92523 SPEECH SOUND LANG COMPREHEN: CPT

## 2020-03-17 PROCEDURE — 2060000000 HC ICU INTERMEDIATE R&B

## 2020-03-17 RX ORDER — NIFEDIPINE 30 MG/1
30 TABLET, FILM COATED, EXTENDED RELEASE ORAL DAILY
Status: DISCONTINUED | OUTPATIENT
Start: 2020-03-18 | End: 2020-03-18 | Stop reason: HOSPADM

## 2020-03-17 RX ORDER — LEVETIRACETAM 500 MG/1
1000 TABLET ORAL 2 TIMES DAILY
Qty: 60 TABLET | Refills: 3 | Status: SHIPPED | OUTPATIENT
Start: 2020-03-17 | End: 2020-03-17 | Stop reason: SDUPTHER

## 2020-03-17 RX ORDER — METOPROLOL SUCCINATE 50 MG/1
50 TABLET, EXTENDED RELEASE ORAL DAILY
Status: DISCONTINUED | OUTPATIENT
Start: 2020-03-17 | End: 2020-03-18 | Stop reason: HOSPADM

## 2020-03-17 RX ORDER — LEVETIRACETAM 500 MG/1
1000 TABLET ORAL 2 TIMES DAILY
Status: DISCONTINUED | OUTPATIENT
Start: 2020-03-17 | End: 2020-03-18 | Stop reason: HOSPADM

## 2020-03-17 RX ORDER — LANOLIN ALCOHOL/MO/W.PET/CERES
400 CREAM (GRAM) TOPICAL ONCE
Status: COMPLETED | OUTPATIENT
Start: 2020-03-17 | End: 2020-03-17

## 2020-03-17 RX ORDER — POTASSIUM CHLORIDE 20 MEQ/1
40 TABLET, EXTENDED RELEASE ORAL ONCE
Status: COMPLETED | OUTPATIENT
Start: 2020-03-17 | End: 2020-03-17

## 2020-03-17 RX ORDER — LEVETIRACETAM 1000 MG/1
1000 TABLET ORAL 2 TIMES DAILY
Qty: 60 TABLET | Refills: 3 | Status: SHIPPED | OUTPATIENT
Start: 2020-03-17 | End: 2020-03-18 | Stop reason: SDUPTHER

## 2020-03-17 RX ORDER — MAGNESIUM SULFATE IN WATER 40 MG/ML
2 INJECTION, SOLUTION INTRAVENOUS ONCE
Status: COMPLETED | OUTPATIENT
Start: 2020-03-17 | End: 2020-03-17

## 2020-03-17 RX ORDER — AMLODIPINE BESYLATE 10 MG/1
10 TABLET ORAL DAILY
Status: DISCONTINUED | OUTPATIENT
Start: 2020-03-17 | End: 2020-03-17

## 2020-03-17 RX ADMIN — LEVETIRACETAM 1000 MG: 100 INJECTION, SOLUTION INTRAVENOUS at 10:46

## 2020-03-17 RX ADMIN — Medication 400 MG: at 10:56

## 2020-03-17 RX ADMIN — HYDRALAZINE HYDROCHLORIDE 10 MG: 20 INJECTION INTRAMUSCULAR; INTRAVENOUS at 12:06

## 2020-03-17 RX ADMIN — PANTOPRAZOLE SODIUM 40 MG: 40 INJECTION, POWDER, FOR SOLUTION INTRAVENOUS at 10:46

## 2020-03-17 RX ADMIN — METOPROLOL SUCCINATE 50 MG: 50 TABLET, EXTENDED RELEASE ORAL at 10:46

## 2020-03-17 RX ADMIN — LABETALOL 20 MG/4 ML (5 MG/ML) INTRAVENOUS SYRINGE 10 MG: at 00:22

## 2020-03-17 RX ADMIN — Medication 10 ML: at 10:46

## 2020-03-17 RX ADMIN — Medication 15 ML: at 20:41

## 2020-03-17 RX ADMIN — LEVETIRACETAM 1000 MG: 500 TABLET ORAL at 20:41

## 2020-03-17 RX ADMIN — HYDRALAZINE HYDROCHLORIDE 10 MG: 20 INJECTION INTRAMUSCULAR; INTRAVENOUS at 03:06

## 2020-03-17 RX ADMIN — Medication 10 ML: at 20:43

## 2020-03-17 RX ADMIN — POTASSIUM CHLORIDE 40 MEQ: 20 TABLET, EXTENDED RELEASE ORAL at 10:55

## 2020-03-17 RX ADMIN — AMLODIPINE BESYLATE 10 MG: 10 TABLET ORAL at 10:46

## 2020-03-17 RX ADMIN — MAGNESIUM SULFATE HEPTAHYDRATE 2 G: 40 INJECTION, SOLUTION INTRAVENOUS at 11:30

## 2020-03-17 ASSESSMENT — PAIN SCALES - GENERAL
PAINLEVEL_OUTOF10: 0

## 2020-03-17 ASSESSMENT — ENCOUNTER SYMPTOMS
PHOTOPHOBIA: 0
STRIDOR: 0
CONSTIPATION: 0
DIARRHEA: 0
ABDOMINAL PAIN: 0
TROUBLE SWALLOWING: 0
COUGH: 0
VOMITING: 0
CHEST TIGHTNESS: 0
APNEA: 0
SHORTNESS OF BREATH: 0
ABDOMINAL DISTENTION: 0
NAUSEA: 0
WHEEZING: 0
CHOKING: 0
VOICE CHANGE: 0

## 2020-03-17 NOTE — PROGRESS NOTES
Individual Concurrent Group Co-treatment   Time In 0830         Time Out 0850         Minutes 20            Plan:  Recommend cog/linguistic tx 3-5 x/week  Dc recommendation: pt would benefit from follow up tx at next level of care  Nicola Doran M.S./[facility]-SLP #[id number]  Pg.  # [phone]  Needs met prior to leaving room, call light within reach  This document will serve as a dc summary if pt dc prior to next visit    3/17/2020 9:27 AM

## 2020-03-17 NOTE — DISCHARGE INSTR - DIET

## 2020-03-17 NOTE — PROGRESS NOTES
deficit. Sensory: No sensory deficit. Psychiatric:         Behavior: Behavior normal.         LABS:    CBC:   Recent Labs     03/15/20  0447 03/16/20  0438 03/17/20  0454   WBC 6.5 5.6 5.6   HGB 13.4* 11.8* 12.8*   HCT 41.8 36.3* 37.7*   * 157 164   .9* 97.1 95.7     Renal:    Recent Labs     03/15/20  0447 03/16/20  0438 03/17/20  0454    138 140   K 4.1 3.3* 3.6    102 100   CO2 15* 18* 21   BUN 8 8 6*   CREATININE 0.7* 0.8 0.8   GLUCOSE 78 98 99   CALCIUM 8.6 8.4 8.8   MG  --  1.30* 1.10*   ANIONGAP 20* 18* 19*     Hepatic: No results for input(s): AST, ALT, BILITOT, BILIDIR, PROT, LABALBU, ALKPHOS in the last 72 hours. Troponin: No results for input(s): TROPONINI in the last 72 hours. BNP: No results for input(s): BNP in the last 72 hours. Lipids: No results for input(s): CHOL, HDL in the last 72 hours. Invalid input(s): LDLCALCU, TRIGLYCERIDE  ABGs:    Recent Labs     03/14/20  1307   PHART 7.383   WWM9RXR 35.5   PO2ART 85.7   TMM6CAG 21.2   BEART -4*   [id number] 96   ISF6BKW 22       INR: No results for input(s): INR in the last 72 hours. Lactate: No results for input(s): LACTATE in the last 72 hours. Cultures:  -----------------------------------------------------------------  RAD:   CT CHEST ABDOMEN PELVIS WO CONTRAST   Final Result      I lateral effusions with bibasilar airspace disease or dependent atelectasis. 1 cm nodule seen posteriorly in superior segment of left lower lobe. Following the Fleischner Society 2017 guidelines for single solid nodules >8 mm, if patient is low risk then consider CT at 3 months, PET/CT, or tissue sampling. If patient is high    risk, then consider CT at 3 months, PET/CT, or tissue sampling.  qzxv      Fatty infiltration of liver. Cholelithiasis. Trace ascites. Slight wedging of T12 and L1 vertebral bodies, likely chronic but should be correlated clinically.       Otherwise no acute abnormality or evidence of to obtain due to IVC filter   - Mild expressive aphasia, Speech eval today for cognitive and linguistic evaluation/treatment      Bilateral acute and chronic lower extremity DVT  - IVC filter placed 3/13  - cannot AC d/t ICH for 2 weeks   - Unknown etiology, CT C/A/P done ruled out malignancy, notice a 1 cm nodule on LLL lung. Will need a repeat CT chest in 3 months for monitoring. No history of smoking.   - Last cscope 3 years ago with some benign polyps as per patient, will need a repeat in 3-5 years   - Will benefit from hypercoag study outpatient     Essential hypertension   Came in with hypertension emergency, resolved. Was not on any medications at home   - Start Norvasc 3/16  - Restart home Torprol  - Goal <160/90     Lung nodule   Low risk. 1 cm nodule notice on CT C/A/P. Will need a repeat CT chest in 3 months  No history of smoking     Other resolved hospital problems   Urinary tract infection: r/o, resolved.    - Rocephin x 3 days      Hypertensive emergency - resolved  - nicardipine gtt weaned  - SBP < 140     Acute kidney injury - resolved  - baseline creatinine normal  - monitor with fluids     Type 2 NSTEMI - resolved  - trop 0.06 x 3, will stop trending    Acute metabolic encephalopathy secondary to the above, resolved      Chronic problems  Seizure disorder - takes Keppra 500mg BID at home  Afib - AC contraindicated due to fall risk, seizure disorder, brain bleed  CAD s/p NSTEMI - continue statin, Toprol  Depression - holding trazodone and Zoloft  GERD - continue Protonix  EtOH abuse - CIWA assessment, rally pack     Code Status: Full code   FEN: DIET DYSPHAGIA SOFT AND BITE-SIZED;  PPX: SCDs   DISPO: ICU     Valerie Oliva MD, PGY-1  03/17/20  7:14 AM    This patient has been staffed and discussed with Maxwell Orona MD.

## 2020-03-17 NOTE — PROGRESS NOTES
Treatment Diagnosis: Impaired ADL and functional mobililty      Restrictions  Position Activity Restriction  Other position/activity restrictions: Elevate HOB 30 deg. Seizure precautions. Subjective   General  Chart Reviewed: Yes  Additional Pertinent Hx: Hospital Course:  CT head: intraparenchymal hematoma R parietal; Intub; L foot: neg; L tib/fib: neg; LE dopplers: (+) acute R DVT, (+) partially occluding L DVT; 3/13 IVC Filter placed; cEEG for seizures. PMH:  Paroxysmal A-Fib, Depression, Alcoholism. Family / Caregiver Present: No  Referring Practitioner: Dr. Socorro Corcoran  Diagnosis: Intracranial Hemorrhage  Subjective  Subjective: Pt in bed upon entry. At least I have pants on.   Patient Currently in Pain: Denies  Social/Functional History  Social/Functional History  Lives With: Alone  Type of Home: Facility(Indep living)  Home Layout: One level  Home Access: Level entry  Bathroom Shower/Tub: Walk-in shower  Bathroom Toilet: Handicap height  Bathroom Equipment: Shower chair, Grab bars in shower, Grab bars around toilet  Home Equipment: Cane, Standard walker  ADL Assistance: [address] [place] [address] Avenue: Independent(carries meals from dining room back to his room, indep with laundry and cleaning)  Ambulation Assistance: Independent  Transfer Assistance: Independent  Active : No  Type of occupation: 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       Objective   Vision: Impaired  Vision Exceptions: Wears glasses for reading  Hearing: Within functional limits    Orientation  Overall Orientation Status: Within Functional Limits     Balance  Sitting Balance: Supervision(sitting edge of bed)  Standing Balance: Contact guard assistance  Standing Balance  Time: 15 sec  Activity: pulling up pants  ADL  Grooming: Setup(combing hair in bed)  LE Dressing: Contact guard assistance(to don pants)  Toileting: (denied need)  Tone RUE  RUE Tone: Normotonic  Tone LUE  LUE Tone: Normotonic  Coordination  Movements Are Fluid And Coordinated: Yes     Bed mobility  Supine to Sit: Modified independent(HOB elevated)  Sit to Supine: Modified independent(HOB flat for transfer)  Scooting: Stand by assistance(scooting laterally at EOB towards HOB)  Transfers  Sit to stand: Contact guard assistance  Stand to sit: Contact guard assistance     Cognition  Overall Cognitive Status: WFL                 LUE AROM (degrees)  LUE AROM : WFL  RUE AROM (degrees)  RUE AROM : WFL  LUE Strength  Gross LUE Strength: WFL  RUE Strength  Gross RUE Strength: WFL     Hand Dominance  Hand Dominance: Left         Treatment included ADL and transfer training. Second Session: Pt seen 23 min for functional mobility and ADL. Pt transferred to/from toilet with use of grab bar with CG-SBA (pt decline need for toileting). Pt stood at sink ~2 min with CG-SBA while brushing teeth, combing hair, and wiping face. Pt walked in martin with CG. Feel pt would benefit from further OT services.      Plan   Plan  Times per week: 5-7  Times per day: Daily  Current Treatment Recommendations: Functional Mobility Training, Endurance Training, Self-Care / ADL    AM-[facility] Score        AM-[facility] Inpatient Daily Activity Raw Score: 21 (03/17/20 1339)  AM-[facility] Inpatient ADL T-Scale Score : 44.27 (03/17/20 1339)  ADL Inpatient CMS 0-100% Score: 32.79 (03/17/20 1339)  ADL Inpatient CMS G-Code Modifier : Boochristian Trimble (03/17/20 1339)    Goals                              No goals met  Short term goals  Time Frame for Short term goals: Discharge  Short term goal 1: Independent with lower body dressing   Short term goal 2: Transfer to/from toilet with supervision  Short term goal 3: Stance with supervision x5 min while engaging in ADL/functional mobility  Patient Goals   Patient goals : Go home       Therapy Time   Individual Concurrent Group Co-treatment   Time In 99         Time Out 1203         Minutes 25           Therapy Time   Individual Concurrent Group Co-treatment   Time In 1311         Time Out 10539         Minutes 23           Timed Code Treatment Minutes: 15 Minutes    Total Treatment [address], OTR/L [id number]

## 2020-03-17 NOTE — PROGRESS NOTES
Speech Language Pathology  Facility/Department: [place] [address] PCU  Dysphagia Daily Treatment Note    NAME: Mel Menjivar  : 1950  MRN: [id number]    Patient Diagnosis(es):   Patient Active Problem List    Diagnosis Date Noted    Intracranial hemorrhage ([facility] 2020    Acute respiratory failure with hypoxia and hypercapnia ([facility] 2020    Essential hypertension 2020    Acute encephalopathy     Hypokalemia     [facility] ([facility] 2020    GERD (gastroesophageal reflux disease) 2019    HTN (hypertension), benign 2019    Depression 2019    Elevated d-dimer 2019    NSTEMI (non-ST elevated myocardial infarction) ([facility] 2019     Allergies: No Known Allergies    CT Chest / Abdomen (3/15/20)      I lateral effusions with bibasilar airspace disease or dependent atelectasis.     1 cm nodule seen posteriorly in superior segment of left lower lobe. Following the Fleischner Society 2017 guidelines for single solid nodules >8 mm, if patient is low risk then consider CT at 3 months, PET/CT, or tissue sampling.  If patient is high    risk, then consider CT at 3 months, PET/CT, or tissue sampling.  qzxv       Fatty infiltration of liver.       Cholelithiasis.       Trace ascites.       Slight wedging of T12 and L1 vertebral bodies, likely chronic but should be correlated clinically.       Otherwise no acute abnormality or evidence of malignancy identified given limitations of noncontrast evaluation. Previous MBS none  Chart reviewed. Medical Diagnosis: ICH  Treatment Diagnosis: dysphagia    BSE Impression (3/15/20)  Pt alert, oriented, follows commands and answered questions appropriately, though verbose. Oral structures grossly intact, no asymmetry noted. Pt able to cough and swallow on command. Presented pt with puree, thin liquids via cup / straw, including 3 ounces of uninterrupted swallows, as well as a buster cracker.  Pt demonstrated single instance of throat clearing after first sip of water, that pt wished to take from straw. Pt then demonstration no other overt signs of aspiration: no coughing/throat clearing or change in vocal quality. Good labial seal with no anterior loss of liquids. Good swallow movement noted upon palpation of anterior neck. Pt exhibited no difficulty with mastication of cracker, no oral residue. No c/o globus sensation. Pt does have yankauer that he is using to suction occasional secretions. MBS results  Not warranted at this time; pt also on cEEG and unable to go for procedure    Pain: None indicated    Current Diet :  Dysphagia III soft and bite sized / thins    Treatment:  Pt seen bedside to address the following goals:  1-The patient will tolerate recommended diet without observed clinical signs of aspiration  3/16: Pt in bed upon entry, currently on 4L O2 via NC. Pt denied any difficulty with PO intake despite \"don't eat it all at times. \" Pt tangential with poor turn taking during conversation; pt required written aid to answer orientation questions. Lability exhibited when SLP attempted to redirect pt to task from tangential episodes. Analyzed pt with mixed consistency noodles and thin liquid in addition to thins via straw. Pt adamantly denied desire for harder, crunchier solids and endorsed desire to remain on current diet; pt denied trials of regular solids. No overt signs associated with aspiration exhibited with any consistency trialed, including sequential sips of thins via straw. Cough / throat clear x 1 exhibited during session but this appeared to be in preparation to verbalize vs related to PO intake. Lungs remain clear / diminished per RN documentation. GOAL MET - continue to ensure consistency. 3/17: pt analyzed with breakfast consisting of omelette, coffee, juice and toast. Pt exhibited adequate mastication with soft solids, no oral residue noted, no anterior spillage.   No overt signs of aspiration with liquids via cup/straw, voice clear. No respiratory decline per MD notes. Goal met, cont one more session to ensure consistency    2- The patient Sherri Kerr will verbalize/demonstrate understanding of dysphagia recommendations  3/15; Educated pt to purpose of visit, impact intubation can have on swallowing, s/s of aspiration, concern if aspiration occurs, rationale for diet recommendation/strategies to reduce risk for aspiration and instruction to notify staff if any signs emerge. Pt stated understanding but will require reinforcement. 3/16: Educated pt to role of SLP, purpose of visit, impact CVA can have on swallow, s/s and risks associated with aspiration, diet consistency options, opportunity to self-select items on regular diet, current diet, and compensatory swallow strategies. Pt continued to endorse desire to remain on current diet consistency. Pt verbalized comprehension of all information but will require reinforcement d/t cognitive deficits. Goal not met - continue to target. 3/17: pt educated to purpose of visit, s/s of aspiration and concern if aspiration occurs. Pt also educated to strategies to improve safety of swallow. Pt stated understanding but will require cont reinforcement/education  Cont goal    Patient/Family/Caregiver Education:  As above    Compensatory Strategies:  Upright as possible for all oral intake  Small bites/sips     Plan:  Continued daily Dysphagia treatment with goals per  plan of care. Diet recommendations: continue dysphagia soft and bite sized (per pt request) / thin liquids  DC recommendation: will not require for dysphagia most likely; ongoing ST warranted for cognitive-linguistic deficits  Treatment: 15  D/W nursingJake  Needs met prior to leaving room, call button in reach. Galo Gordon M.S./[facility]-SLP #[id number]  Pg.  # [phone]  If patient is discharged prior to next treatment, this note will serve as the discharge summary

## 2020-03-17 NOTE — PLAN OF CARE
Problem: Neurological  Intervention: PT Evaluation/treatment  Note: Increase safety and independence with functional mobility.

## 2020-03-17 NOTE — PROGRESS NOTES
Physical Therapy/Occupational therapy  HOLD    Pt remains on continuous EEG. Spoke with RN who states pt should come off today, although time is uncertain. Will f/u later this date as schedule allows and pt available vs 3/18. Shorty Berry, PT, DPT  [id number]  OCTAVIA Kaur 02, [facility]

## 2020-03-17 NOTE — DISCHARGE INSTR - COC
care    Address: [address] [facility], [address] [place]  Phone: [phone]  Fax: [phone]      / signature: {Esignature:[id number]}    PHYSICIAN SECTION    Prognosis: Fair    Condition at Discharge: Stable    Rehab Potential (if transferring to Rehab): Fair    Recommended Labs or Other Treatments After Discharge:   1. Patient advised and instructed on seizure precautions he cannot engage in any activity where loss of consciousness would be dangerous to himself  or others (e.g. driving, climbing, operating heavy machinery, swimming alone, etc.) until approved by his physician (seizure-free for at least 3-6 months). He  expressed understanding of all instructions given    2. Repeat Renal panel in one week    3. Follow up with PCP in one week, follow up with primary neurologist in one week    4. Recommend to get a follow up CT chest for re-evaluation of the lung nodule     5. Patient is benefit from outpatient workup for hypercoagulation given findings of his bilateral DVT    Physician Certification: I certify the above information and transfer of Goldie Flynn  is necessary for the continuing treatment of the diagnosis listed and that he requires {Admit to Appropriate Level of Care:33825} for {GREATER/LESS:284588471} 30 days.      Update Admission H&P: No change in H&P    PHYSICIAN SIGNATURE:  Electronically signed by Deliliah Kehr, MD on 3/17/20 at 11:17 AM EDT

## 2020-03-17 NOTE — DISCHARGE SUMMARY
INTERNAL MEDICINE DEPARTMENT AT [address] [place] [address]  DISCHARGE SUMMARY    Patient ID: Geovanny Steven                                             Discharge Date: 3/17/2020   Patient's PCP: No primary care provider on file. Discharge Physician: Cat Singleton MD  Admit Date: 3/11/2020   Admitting Physician: Johanne Cope MD    PROBLEMS DURING HOSPITALIZATION:  Present on Admission:   Intracranial hemorrhage ([facility]   New onset seizure ([address]   Acute respiratory failure with hypoxia and hypercapnia ([address]   Essential hypertension      DISCHARGE DIAGNOSES:  Intracranial Hemorrhage  New onset seizure   Bilateral acute and chronic lower extremity DVT   Hypertensive emergency, resolved  Acute kidney injury, resolved  Type 2 NSTEMI, resolved  UTI, resolved  Acute metabolic encephalopathy, 2/2 multiple factors, resolved  Lung nodule    Brief Hospital Course: This is a 71year old male with history of seizures, HTN, Afib who presented initially to [facility] after being found down at his independent living facility. He was found to have left sided weakness and left sided facial droop. At outside ED, he became cyanotic, appeared to have respiratory arrest, and was briefly pulseless, required brief CPR. He was intubated and sedated. His CT scan revealed left parietal bleed and received Ativan for possible seizure. He was transferred to [facility] and admitted to the ICU for management of intracranial hemorrhage. He was found to have acute intracerebral hemorrhage in left parietal region. Seizures were likely triggered by acute event with possible prior history of seizure disorder as he was on Keppra prior to admission. Neurosurgery and neurology were consulted. He was placed on continuous EEG. Follow up CT head stable. He did have hypertensive emergency, which was controlled with nicardipine.  He was then able to be off from the drip and his home antihypertensives were resumed upon discharged. Had UTI treated with a course of Rocephin. He also had ESDRAS, type 2 NSTEMI, which improved with IV fluid and better blood pressure control. His hospital course was also remarkable for acute on chronic DVT on bilateral LE. Due to his ICH, he received IVC filer on 3/13/2020. Given his age, CT C/A/P was done to assess for any malignancy. CAT scans were negative except there was a finding of a 1-cm lung nodule on LLL lung. Since he is low risk, he will need a repeat CT chest to follow up on this lung nodule in 3 months. He was also advised to keep uptodate on his health screening such as colonoscopy. Last one was 3 years ago. Successfully extubated and transferred to PCU. Per neuro, he had electrographic seizure on 3/14/2020, now has been seizure free. cEEG discontinued and remains on Keppra 1g BID. MRI brain was not able to obtain due to IVC filter. Per neurosurgery, no anticoagulation or antiplatelets for two weeks. Speech evaluated for cognitive and linguistic post stroke. His other chronic problems were also addressed while patient was in the hospital. He was then discharged in stable conditions with appropriate follow up instructions. Physical Exam:  BP (!) 154/79   Pulse 101   Temp 98.7 °F (37.1 °C) (Oral)   Resp 20   Ht 5' 9\" (1.753 m)   Wt 168 lb 6.9 oz (76.4 kg)   SpO2 95%   BMI 24.87 kg/m²   Vitals signs and nursing note reviewed. Constitutional:       General: He is not in acute distress. Appearance: He is well-developed. He is obese. He is not ill-appearing, toxic-appearing or diaphoretic. HENT:      Head: Normocephalic and atraumatic. Eyes:      General: No scleral icterus. Extraocular Movements: Extraocular movements intact. Conjunctiva/sclera: Conjunctivae normal.      Pupils: Pupils are equal, round, and reactive to light. Neck:      Musculoskeletal: Normal range of motion and neck supple.    Cardiovascular:      Rate and Rhythm: Normal rate

## 2020-03-17 NOTE — PROGRESS NOTES
Neurology Follow Up  Note    Updates:  Seen for seizure. Now extubated and following commands had electrographic seizures on 3/14/2020 remains on CVEEG. ROS:  Constitutional: denies fatigue, malaise  Eyes: denies any vision changes,  Musculoskeletal: denies any pain or decreased ROM, no functional deficit. Neurological: denies headache, numbness or tingling, speech problems. Exam:  Blood pressure 130/86, pulse 101, temperature 98.7 °F (37.1 °C), temperature source Oral, resp. rate 18, height 5' 9\" (1.753 m), weight 168 lb 6.9 oz (76.4 kg), SpO2 92 %. Constitutional    Vital signs: BP, HR, and RR reviewed   General Sedated on mechanical ventilation , well-nourished  Eyes: fundoscopic exam not visualized. .   Cardiovascular: pulses symmetric in all 4 extremities. No peripheral edema. Psychiatric: does not cooperate with examination, no  psychotic behavior noted. Neurologic   Mental status:   orientation to person, place and time. General fund of knowledge grossly intact   Memory grossly intact   Attention intact as able to attend well to the exam     Language fluent in conversation   Comprehension intact; follows simple commands  Cranial nerves:   CN2: Visual Fields full to threat. CN 3,4,6: extraocular muscles tracks from side to side   CN5: facial sensation symmetric   CN7:face symmetric without dysarthria,   CN8: hearing grossly intact  CN9: palate elevated symmetrically  CN11: trap full strength on shoulder shrug  CN12: tongue midline with protrusion  Strength:  Good strength in all 4 extremities   Deep tendon reflexes: normal in all 4 extremities  Sensory: light touch intact in all 4 extremities.  Cerebellar/coordination: finger nose finger normal without ataxia  Tone: normal in all 4 extremities  Gait: deferred due to safety concerns     Labs:    CBC:   Lab Results   Component Value Date    WBC 5.6 03/17/2020    RBC 3.94 03/17/2020    HGB 12.8 03/17/2020    HCT 37.7 03/17/2020    MCV 95.7 03/17/2020    MCH 32.4 03/17/2020    MCHC 33.9 03/17/2020    RDW 18.0 03/17/2020     03/17/2020    MPV 8.4 03/17/2020     BMP:    Lab Results   Component Value Date     03/17/2020    K 3.6 03/17/2020    K 3.8 03/11/2020     03/17/2020    CO2 21 03/17/2020    BUN 6 03/17/2020    LABALBU 4.1 03/11/2020    CREATININE 0.8 03/17/2020    CALCIUM 8.8 03/17/2020    GFRAA >60 03/17/2020    LABGLOM >60 03/17/2020    GLUCOSE 99 03/17/2020         Studies:  CT CHEST ABDOMEN PELVIS WO CONTRAST   Final Result      I lateral effusions with bibasilar airspace disease or dependent atelectasis. 1 cm nodule seen posteriorly in superior segment of left lower lobe. Following the Fleischner Society 2017 guidelines for single solid nodules >8 mm, if patient is low risk then consider CT at 3 months, PET/CT, or tissue sampling. If patient is high    risk, then consider CT at 3 months, PET/CT, or tissue sampling.  qzxv      Fatty infiltration of liver. Cholelithiasis. Trace ascites. Slight wedging of T12 and L1 vertebral bodies, likely chronic but should be correlated clinically. Otherwise no acute abnormality or evidence of malignancy identified given limitations of noncontrast evaluation. CTA VENOUS HEAD W WO CONTRAST   Final Result      Limited CT venogram as examination was performed predominantly in the arterial phase. No cortical venous thrombosis noted on the right. 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 IR GUIDED IVC FILTER PLACEMENT   Final Result   1. Widely patent vena cava with no evidence of venous thrombosis. 2. Satisfactory placement positioning of option inferior vena cava filter as described.          XR ABDOMEN (KUB) (SINGLE AP VIEW)   Final Result      Frontal view demonstrates an unremarkable bowel gas pattern. Catheter projects over the pelvis. No IVC filter is visualized. VL Extremity Venous Bilateral   Final Result      CT head without contrast   Final Result      1. Stable right parietal intraparenchymal hematoma. Mild adjacent edema. No new or progressive intracranial hemorrhage. XR PELVIS (1-2 VIEWS)   Final Result   1. No acute abnormality. XR FEMUR LEFT (MIN 2 VIEWS)   Final Result   1. No acute osseous abnormality. XR TIBIA FIBULA LEFT (2 VIEWS)   Final Result   1. Soft tissue swelling without acute osseous abnormality. XR FOOT LEFT (MIN 3 VIEWS)   Final Result   1. No acute osseous normality. 2. Extensive soft tissue swelling. MRI BRAIN WO CONTRAST    (Results Pending)         CVEE2020  00:00am to 08:00am  SEIZURES:  None   INTERICTAL:  Frequent bifrontal sharps  PUSHBUTTONS:  None  BACKGROUND:  Abundant generalized alpha/beta frequencies   EKG:  regular        03/15/2020   09:00am to 23:59pm  SEIZURES:  None   INTERICTAL:  Frequent bifrontal sharps  PUSHBUTTONS:  None  BACKGROUND:  Abundant generalized alpha/beta frequencies   EKG:  regular    2020    22:00pm to 09:00am on 03/15/2020  SEIZURES:  No further seizures during this epoch   INTERICTAL:  Frequent bifrontal sharps  PUSHBUTTONS:  None  BACKGROUND:  Abundant generalized alpha/beta frequencies   EKG:  regular      2020   10:00am to 22:00pm  SEIZURES:  21:48pm bifrontal subclinical seizure lasting 21 seconds.      INTERICTAL:  Frequent bifrontal sharps  PUSHBUTTONS:  None  BACKGROUND:  Abundant generalized alpha/beta frequencies   EKG:  regular       2020 00:00am to 08:00am  SEIZURES:  None  INTERICTAL:  None  PUSHBUTTONS:  None  BACKGROUND:  Abundant generalized alpha/beta frequencies   Frequent brief runs of generalized rhythmic 1.5-2 Hz delta activity   EKG:  regular        2020 08:00am to recommendations. -BP control goal <160/90  -No further neurological workup or recommendations. Please call with any questions or concerns.         A copy of this note was provided for Dr Nelly Jenkins, MD Beryle Cower, [facility]-[address] Neuroscience  [phone]  Evenings, weekends, and off weeks please discuss with the covering neurologist.

## 2020-03-17 NOTE — PROGRESS NOTES
CONTINUOUS EEG    Name:  Todd Oneill Record Number:  [id number]  Age: 71 y.o. Gender: male  : 1950  Today's Date:  3/17/2020  Room:  [id number]  Vital Signs   /86   Pulse 101   Temp 98.7 °F (37.1 °C) (Oral)   Resp 18   Ht 5' 9\" (1.753 m)   Wt 168 lb 6.9 oz (76.4 kg)   SpO2 92%   BMI 24.87 kg/m²           Continuous EEG Testing Start Time:      Continuous EEG Testing End Time:   10:45AM    Comments: Debra Euceda pt's test is completed. Contacted [facility] via phone number listed in team viewer    Plan of Care: Removed all leads, washed and dried pt's hair.     Electronically signed by Calvin Renteria on 3/17/2020 at 11:32 AM

## 2020-03-17 NOTE — PROGRESS NOTES
CONTINUOUS VIDEO EEG MONITORING    DATE OF SERVICE: 3/16/2020 08:00 TO     NAME: Barby Ch   YOB: 1950  SEX: male  MEDICAL RECORD [id number]    EEG-CCTV study:   The patient is a 71 y.o.y old male monitored at the request of the team for altered mental status and concern for subclinical seizures. EEG-VIDEO MONITORING METHODOLOGY:   Time-locked EEG-video monitoring was performed using the 32-channel [employer] monitoring system. Analyses of the monitoring data were performed using the following techniques:   1. Review of the relevant EEG-video data. 2. Review of events detected by the computer system in detail. 3. Review of clinical seizures, with both detailed review of EEG and video and playback using multiple montages. A variety of referential and bipolar montages were used. CLINICAL AND EEG ANALYSIS:  Video EEG recording was reviewed in real time by a technologist, and then reviewed at least twice a day when available on the  with maximum possible sampling. Results of the monitoring were related to the treating team frequently throughout the study, at least once a day to help guide treatment via verbal or written communication as brief notes or direct text messages or emails. Updates and response to treatment was communicated as requested by the requesting physician or the team.    3/16/2020    Review 21:00    Seizures - none  Push buttons - none  Background - Continuous generalized low amplitude slow frequencies. Intermittent very low amplitude faster frequencies. Frequent artifact. CLINICAL INTERPRETATION: This is an abnormal video EEG study  1.   Generalized background abnormalities indicative of an underlying diffuse encephalopathy of non-specific etiology

## 2020-03-18 VITALS
OXYGEN SATURATION: 98 % | RESPIRATION RATE: 16 BRPM | WEIGHT: 168.43 LBS | SYSTOLIC BLOOD PRESSURE: 132 MMHG | HEART RATE: 98 BPM | BODY MASS INDEX: 24.95 KG/M2 | DIASTOLIC BLOOD PRESSURE: 78 MMHG | HEIGHT: 69 IN | TEMPERATURE: 98 F

## 2020-03-18 LAB
ANION GAP SERPL CALCULATED.3IONS-SCNC: 14 MMOL/L (ref 3–16)
BASOPHILS ABSOLUTE: 0 K/UL (ref 0–0.2)
BASOPHILS RELATIVE PERCENT: 0.9 %
BUN BLDV-MCNC: 7 MG/DL (ref 7–20)
CALCIUM SERPL-MCNC: 8.9 MG/DL (ref 8.3–10.6)
CHLORIDE BLD-SCNC: 102 MMOL/L (ref 99–110)
CO2: 25 MMOL/L (ref 21–32)
CREAT SERPL-MCNC: 0.7 MG/DL (ref 0.8–1.3)
EOSINOPHILS ABSOLUTE: 0.1 K/UL (ref 0–0.6)
EOSINOPHILS RELATIVE PERCENT: 3 %
GFR AFRICAN AMERICAN: >60
GFR NON-AFRICAN AMERICAN: >60
GLUCOSE BLD-MCNC: 112 MG/DL (ref 70–99)
HCT VFR BLD CALC: 36.9 % (ref 40.5–52.5)
HEMOGLOBIN: 12.6 G/DL (ref 13.5–17.5)
LYMPHOCYTES ABSOLUTE: 0.6 K/UL (ref 1–5.1)
LYMPHOCYTES RELATIVE PERCENT: 14.1 %
MAGNESIUM: 1.4 MG/DL (ref 1.8–2.4)
MCH RBC QN AUTO: 32.1 PG (ref 26–34)
MCHC RBC AUTO-ENTMCNC: 34 G/DL (ref 31–36)
MCV RBC AUTO: 94.4 FL (ref 80–100)
MONOCYTES ABSOLUTE: 1.3 K/UL (ref 0–1.3)
MONOCYTES RELATIVE PERCENT: 28.5 %
NEUTROPHILS ABSOLUTE: 2.4 K/UL (ref 1.7–7.7)
NEUTROPHILS RELATIVE PERCENT: 53.5 %
PDW BLD-RTO: 18 % (ref 12.4–15.4)
PLATELET # BLD: 186 K/UL (ref 135–450)
PMV BLD AUTO: 8.4 FL (ref 5–10.5)
POTASSIUM SERPL-SCNC: 3.4 MMOL/L (ref 3.5–5.1)
RBC # BLD: 3.91 M/UL (ref 4.2–5.9)
SODIUM BLD-SCNC: 141 MMOL/L (ref 136–145)
WBC # BLD: 4.4 K/UL (ref 4–11)

## 2020-03-18 PROCEDURE — 6360000002 HC RX W HCPCS: Performed by: STUDENT IN AN ORGANIZED HEALTH CARE EDUCATION/TRAINING PROGRAM

## 2020-03-18 PROCEDURE — 97129 THER IVNTJ 1ST 15 MIN: CPT

## 2020-03-18 PROCEDURE — 85025 COMPLETE CBC W/AUTO DIFF WBC: CPT

## 2020-03-18 PROCEDURE — 80048 BASIC METABOLIC PNL TOTAL CA: CPT

## 2020-03-18 PROCEDURE — 6370000000 HC RX 637 (ALT 250 FOR IP): Performed by: STUDENT IN AN ORGANIZED HEALTH CARE EDUCATION/TRAINING PROGRAM

## 2020-03-18 PROCEDURE — C9113 INJ PANTOPRAZOLE SODIUM, VIA: HCPCS | Performed by: STUDENT IN AN ORGANIZED HEALTH CARE EDUCATION/TRAINING PROGRAM

## 2020-03-18 PROCEDURE — 92526 ORAL FUNCTION THERAPY: CPT

## 2020-03-18 PROCEDURE — 2580000003 HC RX 258: Performed by: STUDENT IN AN ORGANIZED HEALTH CARE EDUCATION/TRAINING PROGRAM

## 2020-03-18 PROCEDURE — 97530 THERAPEUTIC ACTIVITIES: CPT

## 2020-03-18 PROCEDURE — 83735 ASSAY OF MAGNESIUM: CPT

## 2020-03-18 PROCEDURE — 97535 SELF CARE MNGMENT TRAINING: CPT

## 2020-03-18 PROCEDURE — 36415 COLL VENOUS BLD VENIPUNCTURE: CPT

## 2020-03-18 PROCEDURE — 97116 GAIT TRAINING THERAPY: CPT

## 2020-03-18 PROCEDURE — 97110 THERAPEUTIC EXERCISES: CPT

## 2020-03-18 RX ORDER — POTASSIUM CHLORIDE 20 MEQ/1
40 TABLET, EXTENDED RELEASE ORAL ONCE
Status: COMPLETED | OUTPATIENT
Start: 2020-03-18 | End: 2020-03-18

## 2020-03-18 RX ORDER — LEVETIRACETAM 1000 MG/1
1000 TABLET ORAL 2 TIMES DAILY
Qty: 60 TABLET | Refills: 3 | Status: SHIPPED | OUTPATIENT
Start: 2020-03-18 | End: 2020-10-16

## 2020-03-18 RX ORDER — LANOLIN ALCOHOL/MO/W.PET/CERES
400 CREAM (GRAM) TOPICAL ONCE
Status: COMPLETED | OUTPATIENT
Start: 2020-03-18 | End: 2020-03-18

## 2020-03-18 RX ADMIN — LEVETIRACETAM 1000 MG: 500 TABLET ORAL at 11:05

## 2020-03-18 RX ADMIN — PANTOPRAZOLE SODIUM 40 MG: 40 INJECTION, POWDER, FOR SOLUTION INTRAVENOUS at 11:05

## 2020-03-18 RX ADMIN — POTASSIUM CHLORIDE 40 MEQ: 20 TABLET, EXTENDED RELEASE ORAL at 11:05

## 2020-03-18 RX ADMIN — Medication 400 MG: at 11:05

## 2020-03-18 RX ADMIN — Medication 10 ML: at 11:06

## 2020-03-18 ASSESSMENT — PAIN SCALES - GENERAL
PAINLEVEL_OUTOF10: 0
PAINLEVEL_OUTOF10: 0

## 2020-03-18 NOTE — PROGRESS NOTES
Occupational Therapy  Facility/Department: [place] [address] PCU  Daily Treatment Note  NAME: Trey Chavarria  : 1950  MRN: [id number]    Date of Service: 3/18/2020    Discharge Recommendations:    Trey Chavarria scored a 21/24 on the AM-PAC ADL Inpatient form. Current research shows that an AM-PAC score of 18 or greater is typically associated with a discharge to the patient's home setting. Based on the patients AM-PAC score and their current ADL deficits, it is recommended that the patient have 2-3 sessions per week of Occupational Therapy at d/c to increase the patients independence. If patient discharges prior to next session this note will serve as a discharge summary. Please see below for the latest assessment towards goals. OT Equipment Recommendations  Equipment Needed: No    Assessment   Performance deficits / Impairments: Decreased functional mobility ; Decreased ADL status; Decreased endurance  Assessment: Pt is progressing well. Pt is to be discharged to home today. Pt expresses no concerns regarding discharge to home. Prognosis: Good  Patient Education: Role of OT:  Pt verbalized understanding  REQUIRES OT FOLLOW UP: Yes  Activity Tolerance  Activity Tolerance: Patient Tolerated treatment well  Safety Devices  Safety Devices in place: Yes  Type of devices: Left in chair;Call light within reach; Chair alarm in place;Nurse notified       Restrictions  Position Activity Restriction  Other position/activity restrictions: Elevate HOB 30 deg. Seizure precautions. Subjective   General  Chart Reviewed: Yes  Additional Pertinent Hx: Hospital Course:  CT head: intraparenchymal hematoma R parietal; Intub; L foot: neg; L tib/fib: neg; LE dopplers: (+) acute R DVT, (+) partially occluding L DVT; 3/13 IVC Filter placed; cEEG for seizures. PMH:  Paroxysmal A-Fib, Depression, Alcoholism.   Family / Caregiver Present: No  Referring Practitioner: Dr. Hughes Eardenise  Diagnosis: Intracranial Time In 0950         Time Out 1020         Minutes 30         Timed Code Treatment Minutes: 30 Minutes    Total Treatment Time:30    Aida Delgadoi, OTR/L [id number]

## 2020-03-18 NOTE — PROGRESS NOTES
left; continuous EEG d/c'd 3/17; Head CT showed right parietal ICH.; neuro following; CIWA scale; PMHX: a-fib, depression, cardiac arrest, alcoholism  Subjective  Subjective: Pt found sitting in chair. Pleasant and agreeable to PT. \"I want to tell you my plan. \"  Pt very talkative - discussing his long term plans to move out of indep living and eventually into a house.   Pain Screening  Patient Currently in Pain: Denies       Orientation  Orientation  Overall Orientation Status: Within Normal Limits  Cognition      Objective      Transfers  Sit to Stand: Stand by assistance  Stand to sit: Stand by assistance  Ambulation  Ambulation?: Yes  Ambulation 1  Device: Rolling Walker  Assistance: Stand by assistance  Quality of Gait: steady gait, appropriate hany, needs cues to stay on task 2* pt stops frequently to talk, min cues to avoid obstacles 2* pt distracted  Distance: 300 ft  Stairs/Curb  Stairs?: No     Balance  Sitting - Static: Good  Sitting - Dynamic: Good  Standing - Static: Good  Standing - Dynamic: Fair  Exercises  Comments: x 10 B heel raises and marches with walker and SBA for balance                        G-Code     OutComes Score                                                     AM-PAC Score  AM-PAC Inpatient Mobility Raw Score : 18 (03/18/20 1134)  AM-PAC Inpatient T-Scale Score : 43.63 (03/18/20 1134)  Mobility Inpatient CMS 0-100% Score: 46.58 (03/18/20 1134)  Mobility Inpatient CMS G-Code Modifier : CK (03/18/20 1134)          Goals  Short term goals  Time Frame for Short term goals: discharge  Short term goal 1: pt will transfer sit <--> stand with supervision  ongoing  Short term goal 2: Pt will participate in gait assessment  MET 3/18  New goal: Pt will amb 100 ft with LRAD and supervision  Patient Goals   Patient goals : return home, eventually move to his own home    Plan    Plan  Times per week: 5-7  Current Treatment Recommendations: Patient/Caregiver Education & Training, Balance Training, Gait Training, Functional Mobility Training, Equipment Evaluation, Education, & procurement, Transfer Training  Safety Devices  Type of devices: Call light within reach, Nurse notified, Gait belt, Chair alarm in place, Left in chair     Therapy Time   Individual Concurrent Group Co-treatment   Time In 1055         Time Out 1118         Minutes 23                 Timed Code Treatment Minutes:  23    Total Treatment Minutes:  23    If patient is discharged prior to next treatment, this note will serve as the discharge summary.   Jj Dawson, PT, DPT  [id number]

## 2020-03-18 NOTE — CARE COORDINATION
[facility]    DC order noted, all docs needed have been [facility]ed to [facility].         Dilshad Laureano  Work mobile: [phone]  [facility] office: [phone]
Pt discharged via Lyft, meds to beds sent with him for discharge. [facility] orders faxed for home care.      Clay Gomez RN, BSN, [address]  Case Management Department  [phone]
medications/ co-pay costs: Yes    ADLS:  Current PT AM-PAC Score: 18 /24  Current OT AM-PAC Score: 21 /24      DISCHARGE Disposition: Home with Home Health Care: [facility]     LOC at discharge: Not Applicable  ANIKET Completed: Not Indicated    Notification completed in HENS/PAS?:  Not Applicable    IMM Completed:   Yes, Case management has presented and reviewed IMM letter #2 to the patient and/or family/ POA. Patient and/or family/POA verbalized understanding of their medicare rights and appeal process if needed. Patient and/or family/POA has signed, initialed and placed today's date (3/17/2020) and time (14:00pm) on IMM letter #2 on the the appropriate lines. Patient and/or family/POA, copy of letter offered and they are aware that this original copy of IMM letter #2 is available prior to discharge from the paper chart on the unit. Electronic documentation has been entered into epic for IMM letter #2 and original paper copy has been added to the paper chart at the nurses station.      Transportation:  Transportation PLAN for discharge: family   Mode of Transport: Private Car  Reason for medical transport: Not Applicable  Name of [address] [place] [address],[address]: Not Applicable      Transport form completed: Not Indicated    Home Care:  [address] ordered at discharge: Yes  [address]  Phone: [phone]  Fax: [phone]  Orders faxed: Yes    Durable Medical Equipment:  DME Provider: n/a  Equipment obtained during hospitalization: none    Home Oxygen and Respiratory Equipment:  Oxygen needed at discharge?: Not [address]: Not Applicable  Portable tank available for discharge?: Not Indicated    Dialysis:  Dialysis patient: No    Dialysis Center:  Not Applicable    Hospice Services:  Location: Not Applicable  Agency: Not Applicable    Consents signed: Not Indicated    Referrals made at [facility] for outpatient continued care:  Not Applicable    Additional CM Notes: Pt to return home

## 2020-03-19 LAB — REPORT: NORMAL

## 2020-05-15 ENCOUNTER — HOSPITAL ENCOUNTER (INPATIENT)
Age: 70
LOS: 5 days | Discharge: NURSING, REHAB OR LONG-TERM CARE | DRG: 948 | End: 2020-05-20
Attending: INTERNAL MEDICINE | Admitting: INTERNAL MEDICINE
Payer: MEDICARE

## 2020-05-15 ENCOUNTER — APPOINTMENT (OUTPATIENT)
Dept: GENERAL RADIOLOGY | Age: 70
DRG: 948 | End: 2020-05-15
Payer: MEDICARE

## 2020-05-15 ENCOUNTER — APPOINTMENT (OUTPATIENT)
Dept: CT IMAGING | Age: 70
DRG: 948 | End: 2020-05-15
Payer: MEDICARE

## 2020-05-15 PROBLEM — R41.82 ACUTE ALTERATION IN MENTAL STATUS: Status: ACTIVE | Noted: 2020-05-15

## 2020-05-15 PROBLEM — R77.8 ELEVATED TROPONIN: Status: ACTIVE | Noted: 2020-05-15

## 2020-05-15 PROBLEM — R62.7 FAILURE TO THRIVE IN ADULT: Status: ACTIVE | Noted: 2020-05-15

## 2020-05-15 PROBLEM — D64.9 ANEMIA: Status: ACTIVE | Noted: 2020-05-15

## 2020-05-15 LAB
A/G RATIO: 0.9 (ref 1.1–2.2)
ACETAMINOPHEN LEVEL: <5 UG/ML (ref 10–30)
ALBUMIN SERPL-MCNC: 3.7 G/DL (ref 3.4–5)
ALP BLD-CCNC: 90 U/L (ref 40–129)
ALT SERPL-CCNC: 29 U/L (ref 10–40)
AMMONIA: 22 UMOL/L (ref 16–60)
ANION GAP SERPL CALCULATED.3IONS-SCNC: 21 MMOL/L (ref 3–16)
AST SERPL-CCNC: 93 U/L (ref 15–37)
BASOPHILS ABSOLUTE: 0 K/UL (ref 0–0.2)
BASOPHILS RELATIVE PERCENT: 0.3 %
BILIRUB SERPL-MCNC: 1.2 MG/DL (ref 0–1)
BILIRUBIN URINE: NEGATIVE
BLOOD, URINE: ABNORMAL
BUN BLDV-MCNC: 18 MG/DL (ref 7–20)
CALCIUM SERPL-MCNC: 9.3 MG/DL (ref 8.3–10.6)
CHLORIDE BLD-SCNC: 104 MMOL/L (ref 99–110)
CLARITY: CLEAR
CO2: 20 MMOL/L (ref 21–32)
COLOR: YELLOW
CREAT SERPL-MCNC: 0.9 MG/DL (ref 0.8–1.3)
EOSINOPHILS ABSOLUTE: 0 K/UL (ref 0–0.6)
EOSINOPHILS RELATIVE PERCENT: 0 %
EPITHELIAL CELLS, UA: 0 /HPF (ref 0–5)
ETHANOL: NORMAL MG/DL (ref 0–0.08)
GFR AFRICAN AMERICAN: >60
GFR NON-AFRICAN AMERICAN: >60
GLOBULIN: 4 G/DL
GLUCOSE BLD-MCNC: 98 MG/DL (ref 70–99)
GLUCOSE URINE: NEGATIVE MG/DL
HCT VFR BLD CALC: 37.1 % (ref 40.5–52.5)
HEMOGLOBIN: 12.3 G/DL (ref 13.5–17.5)
HYALINE CASTS: 0 /LPF (ref 0–8)
KETONES, URINE: >=80 MG/DL
LACTIC ACID: 2.1 MMOL/L (ref 0.4–2)
LEUKOCYTE ESTERASE, URINE: ABNORMAL
LYMPHOCYTES ABSOLUTE: 0.3 K/UL (ref 1–5.1)
LYMPHOCYTES RELATIVE PERCENT: 3.3 %
MCH RBC QN AUTO: 33.7 PG (ref 26–34)
MCHC RBC AUTO-ENTMCNC: 33.2 G/DL (ref 31–36)
MCV RBC AUTO: 101.5 FL (ref 80–100)
MICROSCOPIC EXAMINATION: YES
MONOCYTES ABSOLUTE: 1.1 K/UL (ref 0–1.3)
MONOCYTES RELATIVE PERCENT: 11.6 %
NEUTROPHILS ABSOLUTE: 8.1 K/UL (ref 1.7–7.7)
NEUTROPHILS RELATIVE PERCENT: 84.8 %
NITRITE, URINE: NEGATIVE
PDW BLD-RTO: 20.4 % (ref 12.4–15.4)
PH UA: 5.5 (ref 5–8)
PLATELET # BLD: 160 K/UL (ref 135–450)
PMV BLD AUTO: 8.5 FL (ref 5–10.5)
POTASSIUM SERPL-SCNC: 4.3 MMOL/L (ref 3.5–5.1)
PROTEIN UA: 30 MG/DL
RBC # BLD: 3.66 M/UL (ref 4.2–5.9)
RBC UA: 9 /HPF (ref 0–4)
SALICYLATE, SERUM: <0.3 MG/DL (ref 15–30)
SODIUM BLD-SCNC: 145 MMOL/L (ref 136–145)
SPECIFIC GRAVITY UA: 1.02 (ref 1–1.03)
TOTAL PROTEIN: 7.7 G/DL (ref 6.4–8.2)
TROPONIN: 0.06 NG/ML
URINE TYPE: ABNORMAL
UROBILINOGEN, URINE: 0.2 E.U./DL
WBC # BLD: 9.6 K/UL (ref 4–11)
WBC UA: 1 /HPF (ref 0–5)

## 2020-05-15 PROCEDURE — G0480 DRUG TEST DEF 1-7 CLASSES: HCPCS

## 2020-05-15 PROCEDURE — 71045 X-RAY EXAM CHEST 1 VIEW: CPT

## 2020-05-15 PROCEDURE — 99285 EMERGENCY DEPT VISIT HI MDM: CPT

## 2020-05-15 PROCEDURE — 2060000000 HC ICU INTERMEDIATE R&B

## 2020-05-15 PROCEDURE — 84484 ASSAY OF TROPONIN QUANT: CPT

## 2020-05-15 PROCEDURE — 2580000003 HC RX 258: Performed by: PHYSICIAN ASSISTANT

## 2020-05-15 PROCEDURE — 87040 BLOOD CULTURE FOR BACTERIA: CPT

## 2020-05-15 PROCEDURE — 70450 CT HEAD/BRAIN W/O DYE: CPT

## 2020-05-15 PROCEDURE — 83605 ASSAY OF LACTIC ACID: CPT

## 2020-05-15 PROCEDURE — 2580000003 HC RX 258: Performed by: INTERNAL MEDICINE

## 2020-05-15 PROCEDURE — 80053 COMPREHEN METABOLIC PANEL: CPT

## 2020-05-15 PROCEDURE — 81001 URINALYSIS AUTO W/SCOPE: CPT

## 2020-05-15 PROCEDURE — 6370000000 HC RX 637 (ALT 250 FOR IP): Performed by: INTERNAL MEDICINE

## 2020-05-15 PROCEDURE — 85025 COMPLETE CBC W/AUTO DIFF WBC: CPT

## 2020-05-15 PROCEDURE — 80307 DRUG TEST PRSMV CHEM ANLYZR: CPT

## 2020-05-15 PROCEDURE — 82140 ASSAY OF AMMONIA: CPT

## 2020-05-15 RX ORDER — NIFEDIPINE 30 MG/1
30 TABLET, EXTENDED RELEASE ORAL DAILY
Status: DISCONTINUED | OUTPATIENT
Start: 2020-05-16 | End: 2020-05-20 | Stop reason: HOSPADM

## 2020-05-15 RX ORDER — PANTOPRAZOLE SODIUM 40 MG/1
40 TABLET, DELAYED RELEASE ORAL DAILY
Status: DISCONTINUED | OUTPATIENT
Start: 2020-05-16 | End: 2020-05-20 | Stop reason: HOSPADM

## 2020-05-15 RX ORDER — 0.9 % SODIUM CHLORIDE 0.9 %
500 INTRAVENOUS SOLUTION INTRAVENOUS PRN
Status: DISCONTINUED | OUTPATIENT
Start: 2020-05-15 | End: 2020-05-20 | Stop reason: HOSPADM

## 2020-05-15 RX ORDER — LABETALOL HYDROCHLORIDE 5 MG/ML
10 INJECTION, SOLUTION INTRAVENOUS EVERY 10 MIN PRN
Status: DISCONTINUED | OUTPATIENT
Start: 2020-05-15 | End: 2020-05-20 | Stop reason: HOSPADM

## 2020-05-15 RX ORDER — TRAZODONE HYDROCHLORIDE 50 MG/1
50 TABLET ORAL NIGHTLY
Status: DISCONTINUED | OUTPATIENT
Start: 2020-05-15 | End: 2020-05-20 | Stop reason: HOSPADM

## 2020-05-15 RX ORDER — 0.9 % SODIUM CHLORIDE 0.9 %
1000 INTRAVENOUS SOLUTION INTRAVENOUS ONCE
Status: COMPLETED | OUTPATIENT
Start: 2020-05-15 | End: 2020-05-15

## 2020-05-15 RX ORDER — METOPROLOL SUCCINATE 50 MG/1
50 TABLET, EXTENDED RELEASE ORAL DAILY
Status: DISCONTINUED | OUTPATIENT
Start: 2020-05-16 | End: 2020-05-20 | Stop reason: HOSPADM

## 2020-05-15 RX ORDER — ASPIRIN 81 MG/1
81 TABLET ORAL DAILY
Status: DISCONTINUED | OUTPATIENT
Start: 2020-05-16 | End: 2020-05-20 | Stop reason: HOSPADM

## 2020-05-15 RX ORDER — PROMETHAZINE HYDROCHLORIDE 25 MG/1
12.5 TABLET ORAL EVERY 6 HOURS PRN
Status: DISCONTINUED | OUTPATIENT
Start: 2020-05-15 | End: 2020-05-20 | Stop reason: HOSPADM

## 2020-05-15 RX ORDER — SODIUM CHLORIDE 0.9 % (FLUSH) 0.9 %
10 SYRINGE (ML) INJECTION PRN
Status: DISCONTINUED | OUTPATIENT
Start: 2020-05-15 | End: 2020-05-20 | Stop reason: HOSPADM

## 2020-05-15 RX ORDER — SODIUM CHLORIDE 0.9 % (FLUSH) 0.9 %
10 SYRINGE (ML) INJECTION EVERY 12 HOURS SCHEDULED
Status: DISCONTINUED | OUTPATIENT
Start: 2020-05-15 | End: 2020-05-20 | Stop reason: HOSPADM

## 2020-05-15 RX ORDER — ASPIRIN 300 MG/1
300 SUPPOSITORY RECTAL DAILY
Status: DISCONTINUED | OUTPATIENT
Start: 2020-05-16 | End: 2020-05-20 | Stop reason: HOSPADM

## 2020-05-15 RX ORDER — POTASSIUM CHLORIDE 7.45 MG/ML
10 INJECTION INTRAVENOUS PRN
Status: DISCONTINUED | OUTPATIENT
Start: 2020-05-15 | End: 2020-05-20 | Stop reason: HOSPADM

## 2020-05-15 RX ORDER — ONDANSETRON 2 MG/ML
4 INJECTION INTRAMUSCULAR; INTRAVENOUS EVERY 6 HOURS PRN
Status: DISCONTINUED | OUTPATIENT
Start: 2020-05-15 | End: 2020-05-20 | Stop reason: HOSPADM

## 2020-05-15 RX ORDER — POTASSIUM CHLORIDE 20 MEQ/1
40 TABLET, EXTENDED RELEASE ORAL PRN
Status: DISCONTINUED | OUTPATIENT
Start: 2020-05-15 | End: 2020-05-20 | Stop reason: HOSPADM

## 2020-05-15 RX ORDER — HYDRALAZINE HYDROCHLORIDE 20 MG/ML
10 INJECTION INTRAMUSCULAR; INTRAVENOUS EVERY 6 HOURS PRN
Status: DISCONTINUED | OUTPATIENT
Start: 2020-05-15 | End: 2020-05-20 | Stop reason: HOSPADM

## 2020-05-15 RX ADMIN — TRAZODONE HYDROCHLORIDE 50 MG: 50 TABLET ORAL at 23:28

## 2020-05-15 RX ADMIN — Medication 10 ML: at 23:08

## 2020-05-15 RX ADMIN — SODIUM CHLORIDE 1000 ML: 9 INJECTION, SOLUTION INTRAVENOUS at 20:36

## 2020-05-15 ASSESSMENT — PAIN SCALES - GENERAL: PAINLEVEL_OUTOF10: 0

## 2020-05-15 NOTE — ED PROVIDER NOTES
[address] [facility]        Pt Name: Alen Rhoades  MRN: [id number]  [place] 1950  Date of evaluation: 5/15/2020  Provider: Luz Elena August PA-C  PCP: No primary care provider on file. Evaluation by VENU. My supervising physician was available for consultation. CHIEF COMPLAINT       Chief Complaint   Patient presents with    Fall     Pt to ER from [facility] for mulitple falls, states possibly 30 falls in last few days. EMS found pt on ground with AMS, pt endorses frequent hallucinations and confusion       HISTORY OF PRESENT ILLNESS   (Location, Timing/Onset, Context/Setting, Quality, Duration, Modifying Factors, Severity, Associated Signs and Symptoms)  Note limiting factors. Alen Rhoades is a 71 y.o. male who presents for evaluation of confusion and frequent falls. Patient states that ever since he got a pacemaker he has been confused. He states that he intermittently has hallucinations and states that he has fallen 727 times before in the past month but states that he thinks that it is June. He denies any injury from this. He has bruising and scabbed abrasions throughout with no new injury. No bony tenderness step-offs crepitus deformity or dislocation. He is ambulatory. He states that he does not know who called 911 or how he got here today. In his chart he does have history of alcoholism as well. He denies any recent drinking or illicit drug use. He denies any chest pain or shortness of breath. No recent illness cough congestion runny nose. No fevers or chills pain abdominal pain nausea vomiting or diarrhea. No additional information is able to obtained at this time. Nursing Notes were all reviewed and agreed with or any disagreements were addressed in the HPI.     REVIEW OF SYSTEMS    (2-9 systems for level 4, 10 or more for level 5)     Review of Systems   Unable to perform ROS: Mental status change [facility]  [address] [facility], [address]   Phone [phone]   CULTURE, BLOOD 1   CULTURE, BLOOD 2   ETHANOL    Narrative:     Performed at:  [facility]  [address] [facility], [address]   Phone [phone]   [place]    Narrative:     Performed at:  [facility]  [address] [facility] [address]   Phone [phone]   URINE RT REFLEX TO CULTURE   URINE DRUG SCREEN   COMPREHENSIVE METABOLIC PANEL W/ REFLEX TO MG FOR LOW K   HEMOGLOBIN A1C   LIPID PANEL   BASIC METABOLIC PANEL   CBC WITH AUTO DIFFERENTIAL       All other labs were within normal range or not returned as of this dictation. EKG: All EKG's are interpreted by the Emergency Department Physician in the absence of a cardiologist.  Please see their note for interpretation of EKG. RADIOLOGY:   Non-plain film images such as CT, Ultrasound and MRI are read by the radiologist. Plain radiographic images are visualized and preliminarily interpreted by the ED Provider with the below findings:        Interpretation per the Radiologist below, if available at the time of this note:    CT HEAD WO CONTRAST   Final Result   No acute intracranial abnormality. Mild-to-moderate cerebral atrophy appropriate for age. Mild-to-moderate   chronic ischemic changes also age-appropriate. Resolution of the small mid   right parietal hematoma. XR CHEST PORTABLE   Final Result   1. No focal consolidation, pneumothorax, or significant pleural effusion. 2. Persistently enlarged but unchanged cardiomediastinal silhouette. MRI brain without contrast    (Results Pending)     No results found.         PROCEDURES   Unless otherwise noted below, none     Procedures    CRITICAL CARE TIME   N/A    CONSULTS:  IP CONSULT TO INTERNAL MEDICINE      EMERGENCY DEPARTMENT COURSE and DIFFERENTIAL DIAGNOSIS/MDM:   Vitals:    Vitals:    05/15/20 1611 05/15/20 1815 05/15/20 1920 05/15/20 2155   BP: (!) 179/108  (!) 135/97 (!) 174/94   Pulse: 101 98 99 87   Resp: 15 21 26 24   Temp: 98.8 °F (37.1 °C)   99.1 °F (37.3 °C)   TempSrc:    Oral   SpO2: 94%  95% 97%   Weight:    171 lb 8.3 oz (77.8 kg)   Height:    5' 8\" (1.727 m)       Patient was given the following medications:  Medications   metoprolol succinate (TOPROL XL) extended release tablet 50 mg (has no administration in time range)   NIFEdipine (PROCARDIA XL) extended release tablet 30 mg (has no administration in time range)   pantoprazole (PROTONIX) tablet 40 mg (has no administration in time range)   sertraline (ZOLOFT) tablet 50 mg (has no administration in time range)   traZODone (DESYREL) tablet 50 mg (has no administration in time range)   sodium chloride flush 0.9 % injection 10 mL (has no administration in time range)   sodium chloride flush 0.9 % injection 10 mL (has no administration in time range)   magnesium hydroxide (MILK OF MAGNESIA) 400 MG/5ML suspension 30 mL (has no administration in time range)   promethazine (PHENERGAN) tablet 12.5 mg (has no administration in time range)     Or   ondansetron (ZOFRAN) injection 4 mg (has no administration in time range)   enoxaparin (LOVENOX) injection 40 mg (has no administration in time range)   aspirin EC tablet 81 mg (has no administration in time range)     Or   aspirin suppository 300 mg (has no administration in time range)   labetalol (NORMODYNE;TRANDATE) injection 10 mg (has no administration in time range)   potassium chloride (KLOR-CON M) extended release tablet 40 mEq (has no administration in time range)     Or   potassium bicarb-citric acid (EFFER-K) effervescent tablet 40 mEq (has no administration in time range)     Or   potassium chloride 10 mEq/100 mL IVPB (Peripheral Line) (has no administration in time range)   0.9 % sodium chloride bolus (has no administration in time range)   hydrALAZINE (APRESOLINE) injection 10 mg (has no administration in time care the patient at this time. Patient was also informed and is agreeable. He is stable for admission. FINAL IMPRESSION      1. Frequent falls    2. Altered mental status, unspecified altered mental status type    3. General weakness    4. Dehydration          DISPOSITION/PLAN   DISPOSITION        PATIENT REFERREDTO:  No follow-up provider specified.     DISCHARGE MEDICATIONS:  Current Discharge Medication List          DISCONTINUED MEDICATIONS:  Current Discharge Medication List                 (Please note that portions of this note were completed with a voice recognition program.  Efforts were made to edit the dictations but occasionally words are mis-transcribed.)    Kaykay Sevilla PA-C (electronically signed)           Rachel Euceda PA-C  05/15/20 4552

## 2020-05-16 LAB
A/G RATIO: 0.9 (ref 1.1–2.2)
ALBUMIN SERPL-MCNC: 3.4 G/DL (ref 3.4–5)
ALP BLD-CCNC: 84 U/L (ref 40–129)
ALT SERPL-CCNC: 29 U/L (ref 10–40)
AMPHETAMINE SCREEN, URINE: NORMAL
ANION GAP SERPL CALCULATED.3IONS-SCNC: 21 MMOL/L (ref 3–16)
AST SERPL-CCNC: 77 U/L (ref 15–37)
BARBITURATE SCREEN URINE: NORMAL
BASOPHILS ABSOLUTE: 0 K/UL (ref 0–0.2)
BASOPHILS RELATIVE PERCENT: 0.4 %
BENZODIAZEPINE SCREEN, URINE: NORMAL
BILIRUB SERPL-MCNC: 1.1 MG/DL (ref 0–1)
BUN BLDV-MCNC: 16 MG/DL (ref 7–20)
CALCIUM SERPL-MCNC: 8.9 MG/DL (ref 8.3–10.6)
CANNABINOID SCREEN URINE: NORMAL
CHLORIDE BLD-SCNC: 104 MMOL/L (ref 99–110)
CHOLESTEROL, TOTAL: 115 MG/DL (ref 0–199)
CO2: 20 MMOL/L (ref 21–32)
COCAINE METABOLITE SCREEN URINE: NORMAL
CREAT SERPL-MCNC: 0.8 MG/DL (ref 0.8–1.3)
EOSINOPHILS ABSOLUTE: 0 K/UL (ref 0–0.6)
EOSINOPHILS RELATIVE PERCENT: 0.1 %
GFR AFRICAN AMERICAN: >60
GFR NON-AFRICAN AMERICAN: >60
GLOBULIN: 3.7 G/DL
GLUCOSE BLD-MCNC: 84 MG/DL (ref 70–99)
HCT VFR BLD CALC: 35.8 % (ref 40.5–52.5)
HDLC SERPL-MCNC: 33 MG/DL (ref 40–60)
HEMOGLOBIN: 11.9 G/DL (ref 13.5–17.5)
LDL CHOLESTEROL CALCULATED: 64 MG/DL
LYMPHOCYTES ABSOLUTE: 0.5 K/UL (ref 1–5.1)
LYMPHOCYTES RELATIVE PERCENT: 5 %
Lab: NORMAL
MAGNESIUM: 1.6 MG/DL (ref 1.8–2.4)
MCH RBC QN AUTO: 33.3 PG (ref 26–34)
MCHC RBC AUTO-ENTMCNC: 33.1 G/DL (ref 31–36)
MCV RBC AUTO: 100.6 FL (ref 80–100)
METHADONE SCREEN, URINE: NORMAL
MONOCYTES ABSOLUTE: 1.3 K/UL (ref 0–1.3)
MONOCYTES RELATIVE PERCENT: 13.8 %
NEUTROPHILS ABSOLUTE: 7.6 K/UL (ref 1.7–7.7)
NEUTROPHILS RELATIVE PERCENT: 80.7 %
OPIATE SCREEN URINE: NORMAL
OXYCODONE URINE: NORMAL
PDW BLD-RTO: 19.9 % (ref 12.4–15.4)
PH UA: 5
PHENCYCLIDINE SCREEN URINE: NORMAL
PLATELET # BLD: 168 K/UL (ref 135–450)
PMV BLD AUTO: 7.9 FL (ref 5–10.5)
POTASSIUM REFLEX MAGNESIUM: 3.5 MMOL/L (ref 3.5–5.1)
PROPOXYPHENE SCREEN: NORMAL
RBC # BLD: 3.56 M/UL (ref 4.2–5.9)
SODIUM BLD-SCNC: 145 MMOL/L (ref 136–145)
TOTAL PROTEIN: 7.1 G/DL (ref 6.4–8.2)
TRIGL SERPL-MCNC: 89 MG/DL (ref 0–150)
VLDLC SERPL CALC-MCNC: 18 MG/DL
WBC # BLD: 9.4 K/UL (ref 4–11)

## 2020-05-16 PROCEDURE — 6370000000 HC RX 637 (ALT 250 FOR IP): Performed by: INTERNAL MEDICINE

## 2020-05-16 PROCEDURE — 83036 HEMOGLOBIN GLYCOSYLATED A1C: CPT

## 2020-05-16 PROCEDURE — 97535 SELF CARE MNGMENT TRAINING: CPT

## 2020-05-16 PROCEDURE — 80061 LIPID PANEL: CPT

## 2020-05-16 PROCEDURE — 97530 THERAPEUTIC ACTIVITIES: CPT

## 2020-05-16 PROCEDURE — 80053 COMPREHEN METABOLIC PANEL: CPT

## 2020-05-16 PROCEDURE — 97166 OT EVAL MOD COMPLEX 45 MIN: CPT

## 2020-05-16 PROCEDURE — 92526 ORAL FUNCTION THERAPY: CPT

## 2020-05-16 PROCEDURE — 2580000003 HC RX 258: Performed by: INTERNAL MEDICINE

## 2020-05-16 PROCEDURE — 36415 COLL VENOUS BLD VENIPUNCTURE: CPT

## 2020-05-16 PROCEDURE — 97162 PT EVAL MOD COMPLEX 30 MIN: CPT

## 2020-05-16 PROCEDURE — 92610 EVALUATE SWALLOWING FUNCTION: CPT

## 2020-05-16 PROCEDURE — 2060000000 HC ICU INTERMEDIATE R&B

## 2020-05-16 PROCEDURE — 6360000002 HC RX W HCPCS: Performed by: INTERNAL MEDICINE

## 2020-05-16 PROCEDURE — 85025 COMPLETE CBC W/AUTO DIFF WBC: CPT

## 2020-05-16 PROCEDURE — 83735 ASSAY OF MAGNESIUM: CPT

## 2020-05-16 RX ORDER — LORAZEPAM 2 MG/ML
1 INJECTION INTRAMUSCULAR EVERY 4 HOURS PRN
Status: DISCONTINUED | OUTPATIENT
Start: 2020-05-16 | End: 2020-05-20 | Stop reason: HOSPADM

## 2020-05-16 RX ORDER — LORAZEPAM 2 MG/ML
1 INJECTION INTRAMUSCULAR EVERY 6 HOURS PRN
Status: DISCONTINUED | OUTPATIENT
Start: 2020-05-16 | End: 2020-05-16

## 2020-05-16 RX ADMIN — ENOXAPARIN SODIUM 40 MG: 40 INJECTION SUBCUTANEOUS at 08:42

## 2020-05-16 RX ADMIN — Medication 10 ML: at 00:38

## 2020-05-16 RX ADMIN — LORAZEPAM 1 MG: 2 INJECTION INTRAMUSCULAR; INTRAVENOUS at 11:13

## 2020-05-16 RX ADMIN — HYDRALAZINE HYDROCHLORIDE 10 MG: 20 INJECTION INTRAMUSCULAR; INTRAVENOUS at 21:35

## 2020-05-16 RX ADMIN — Medication 10 ML: at 21:35

## 2020-05-16 RX ADMIN — LORAZEPAM 1 MG: 2 INJECTION INTRAMUSCULAR; INTRAVENOUS at 15:20

## 2020-05-16 RX ADMIN — HYDRALAZINE HYDROCHLORIDE 10 MG: 20 INJECTION INTRAMUSCULAR; INTRAVENOUS at 08:40

## 2020-05-16 RX ADMIN — HYDRALAZINE HYDROCHLORIDE 10 MG: 20 INJECTION INTRAMUSCULAR; INTRAVENOUS at 00:38

## 2020-05-16 RX ADMIN — ASPIRIN 300 MG: 300 SUPPOSITORY RECTAL at 08:44

## 2020-05-16 RX ADMIN — Medication 10 ML: at 08:41

## 2020-05-16 ASSESSMENT — PAIN SCALES - GENERAL
PAINLEVEL_OUTOF10: 0
PAINLEVEL_OUTOF10: 3
PAINLEVEL_OUTOF10: 0
PAINLEVEL_OUTOF10: 0
PAINLEVEL_OUTOF10: 3

## 2020-05-16 NOTE — PROGRESS NOTES
Patient is actively hallucinating, yelling out and attempting to grab things that are not present. Patient is incontinent of urine so far this shift. Can be combative with care requiring two person assist and much reassurance and redirection.

## 2020-05-16 NOTE — PROGRESS NOTES
Progress Note - Dr. Martin July - Internal Medicine  PCP: No primary care provider on file. No primary physician on file. None    Hospital Day: 1  Code Status: Full Code  Current Diet: DIET GENERAL;        CC: follow up on medical issues    Subjective:   Peter Lopez is a 71 y.o. male. He denies problems    Doing ok  No new issues  U/a discussed with Morena overnight  No sign of infection  Pt still confused    He denies chest pain, denies shortness of breath, denies nausea,  denies emesis. 10 system Review of Systems is reviewed with patient, and pertinent positives are listed here: None . Otherwise, Review of systems is negative. I have reviewed the patient's medical and social history in detail and updated the computerized patient record. To recap: He  has a past medical history of Alcoholism ([facility] [address] Arthritis, Cardiac arrest ([facility] [address] Depression, Hyperlipidemia, Hypertension, PAF (paroxysmal atrial fibrillation) ([facility] [address] and tia. Anjali Guardado He  has a past surgical history that includes Cataract removal with implant (Bilateral); hernia repair; Colonoscopy; Endoscopy, colon, diagnostic; eye surgery; fracture surgery; and pacemaker placement. Anjali Guardado He  reports that he has never smoked. He has never used smokeless tobacco. He reports current alcohol use of about 2.0 standard drinks of alcohol per week. He reports that he does not use drugs. .        Active Hospital Problems    Diagnosis Date Noted    Anemia [D64.9] 05/15/2020    Elevated troponin [R79.89] 05/15/2020    Failure to thrive in adult [R62.7] 05/15/2020    Acute alteration in mental status [R41.82] 05/15/2020    Essential hypertension [I10] 03/11/2020    HTN (hypertension), benign [I10] 12/05/2019       Current Facility-Administered Medications: metoprolol succinate (TOPROL XL) extended release tablet 50 mg, 50 mg, Oral, Daily  NIFEdipine (PROCARDIA XL) extended release tablet 30 mg, 30 mg, Oral, Daily  pantoprazole (PROTONIX) tablet 40 mg, 40 mg, Oral, Daily  sertraline (ZOLOFT) tablet 50 mg, 50 mg, Oral, Daily  traZODone (DESYREL) tablet 50 mg, 50 mg, Oral, Nightly  sodium chloride flush 0.9 % injection 10 mL, 10 mL, Intravenous, 2 times per day  sodium chloride flush 0.9 % injection 10 mL, 10 mL, Intravenous, PRN  magnesium hydroxide (MILK OF MAGNESIA) 400 MG/5ML suspension 30 mL, 30 mL, Oral, Daily PRN  promethazine (PHENERGAN) tablet 12.5 mg, 12.5 mg, Oral, Q6H PRN **OR** ondansetron (ZOFRAN) injection 4 mg, 4 mg, Intravenous, Q6H PRN  enoxaparin (LOVENOX) injection 40 mg, 40 mg, Subcutaneous, Daily  aspirin EC tablet 81 mg, 81 mg, Oral, Daily **OR** aspirin suppository 300 mg, 300 mg, Rectal, Daily  labetalol (NORMODYNE;TRANDATE) injection 10 mg, 10 mg, Intravenous, Q10 Min PRN  potassium chloride (KLOR-CON M) extended release tablet 40 mEq, 40 mEq, Oral, PRN **OR** potassium bicarb-citric acid (EFFER-K) effervescent tablet 40 mEq, 40 mEq, Oral, PRN **OR** potassium chloride 10 mEq/100 mL IVPB (Peripheral Line), 10 mEq, Intravenous, PRN  0.9 % sodium chloride bolus, 500 mL, Intravenous, PRN  hydrALAZINE (APRESOLINE) injection 10 mg, 10 mg, Intravenous, Q6H PRN         Objective:  BP (!) 193/98   Pulse 98   Temp 98.6 °F (37 °C) (Axillary)   Resp 22   Ht 5' 8\" (1.727 m) Comment: per pt report  Wt 171 lb 8.3 oz (77.8 kg)   SpO2 98%   BMI 26.08 kg/m²      Patient Vitals for the past 24 hrs:   BP Temp Temp src Pulse Resp SpO2 Height Weight   05/16/20 0800 (!) 193/98 98.6 °F (37 °C) Axillary 98 22 98 % -- --   05/16/20 0449 (!) 170/86 98.4 °F (36.9 °C) Axillary 91 24 96 % -- --   05/16/20 0413 -- -- -- 98 -- -- -- --   05/16/20 0105 (!) 172/83 98.7 °F (37.1 °C) Oral 99 22 98 % -- --   05/16/20 0036 (!) 203/102 -- -- 89 22 -- -- --   05/15/20 2300 -- -- -- 82 -- -- -- --   05/15/20 2155 (!) 174/94 99.1 °F (37.3 °C) Oral 87 24 97 % 5' 8\" (1.727 m) 171 lb 8.3 oz (77.8 kg)   05/15/20 1920 (!) 135/97 -- -- 99 26 95 % -- --   05/15/20 1815 -- -- -- 98 21 -- -- -- 05/15/20 1611 (!) 179/108 98.8 °F (37.1 °C) -- 101 15 94 % -- --     Patient Vitals for the past 96 hrs (Last 3 readings):   Weight   05/15/20 2155 171 lb 8.3 oz (77.8 kg)           Intake/Output Summary (Last 24 hours) at 5/16/2020 0920  Last data filed at 5/15/2020 2235  Gross per 24 hour   Intake --   Output 280 ml   Net -280 ml         Physical Exam:   S1, S2 normal, no murmur, rub or gallop, regular rate and rhythm  clear to auscultation bilaterally  abdomen is soft without significant tenderness, masses, organomegaly or guarding  extremities normal, atraumatic, no cyanosis or edema    Labs:  Lab Results   Component Value Date    WBC 9.4 05/16/2020    HGB 11.9 (L) 05/16/2020    HCT 35.8 (L) 05/16/2020     05/16/2020    CHOL 107 03/12/2020    TRIG 76 03/12/2020    HDL 34 (L) 03/12/2020    ALT 29 05/16/2020    AST 77 (H) 05/16/2020     05/16/2020    K 3.5 05/16/2020     05/16/2020    CREATININE 0.8 05/16/2020    BUN 16 05/16/2020    CO2 20 (L) 05/16/2020    INR 1.18 (H) 03/12/2020    LABA1C 5.5 03/12/2020    LABMICR YES 05/15/2020     Lab Results   Component Value Date    CKTOTAL 296 03/11/2020    TROPONINI 0.06 (H) 05/15/2020       Recent Imaging Results are Reviewed:  Ct Head Wo Contrast    Result Date: 5/15/2020  EXAMINATION: CT OF THE HEAD WITHOUT CONTRAST  5/15/2020 5:17 pm TECHNIQUE: CT of the head was performed without the administration of intravenous contrast. Dose modulation, iterative reconstruction, and/or weight based adjustment of the mA/kV was utilized to reduce the radiation dose to as low as reasonably achievable. COMPARISON: 03/13/2020 HISTORY: ORDERING SYSTEM PROVIDED HISTORY: ams TECHNOLOGIST PROVIDED HISTORY: Reason for exam:->ams Has a \"code stroke\" or \"stroke alert\" been called? ->No Reason for Exam: Fall (Pt to ER from [facility] for mulitple falls, states possibly 30 falls in last few days.  EMS found pt on ground with AMS, pt endorses frequent

## 2020-05-16 NOTE — PLAN OF CARE
Problem: Falls - Risk of:  Goal: Will remain free from falls  Description: Will remain free from falls  Outcome: Ongoing  Goal: Absence of physical injury  Description: Absence of physical injury  Outcome: Ongoing     Problem: Cardiovascular  Goal: Hemodynamic stability  Outcome: Ongoing     Problem: Pain Control  Goal: Maintain pain level at or below patient's acceptable level (or 5 if patient is unable to determine acceptable level)  Outcome: Ongoing     Problem: Neurological  Goal: Maximum potential motor/sensory/cognitive function  Outcome: Ongoing  At 2125 Xiao RN in ED called to give report to Christiano RN; report received. Patient admitted from emergency department via stretcher on telemetry. Transferred to bed with staff assist x4. Pt remains disoriented, thrashing in bed, and difficult to turn/reposition. Pt rigid with movements and does not follow directions clearly. Pt startles with minimal stimulation and c/o continued hallucinations. Linens soaked in urine; pants removed and pt cleaned. Linens changed and brief placed on patient. Urinal x2 at bedside for attempt to collect urine for specimen ordered, not done in ED. At 2155 Vital signs obtained. Pt denied pain but is easily agitated. Orders reviewed and acknowledged. Oriented to room and environment. Questions answered. Bed placed in low position. Call light explained and within reach. Urine specimen collected per RN with urinal after RN continually prompted pt for a sample; urine sent to lab at 2230. Results called to Dr. Marcela Pop at [phone]; no new orders. NPO order reiterated, but MD reported RN ok to give HS dose of Trazadone. Gave trazadone at 2328; see MAR & all flowsheets. Pt is a high fall risk. Pt remains free from falls. Bed alarm remains in place, camera at bedside for safety monitoring, and door open. Pt encouraged to use call light for needs throughout night; call light is within reach. Bed lock is in lowest position.  Will continue to monitor throughout night.

## 2020-05-16 NOTE — PROGRESS NOTES
Physical Therapy    Facility/Department: [address]  Initial Assessment    NAME: Waldemar Causey  : 1950  MRN: [id number]    Date of Service: 2020    Discharge Recommendations: Waldemar Causey scored a 7/24 on the AM-PAC short mobility form. Current research shows that an AM-PAC score of 17 or less is typically not associated with a discharge to the patient's home setting. Based on the patient's AM-PAC score and their current functional mobility deficits, it is recommended that the patient have 3-5 sessions per week of Physical Therapy at d/c to increase the patient's independence. At this time, this patient lacks the endurance, and/or tolerance for 3 hours of therapy/day, 5-7x/wk and would benefit most from a follow up treatment frequency of 3-5x/wk. Please see assessment section for further patient specific details. If patient discharges prior to next session this note will serve as a discharge summary. Please see below for the latest assessment towards goals. PT Equipment Recommendations  Equipment Needed: No    Assessment   Body structures, Functions, Activity limitations: Decreased functional mobility ; Decreased strength;Decreased safe awareness;Decreased cognition;Decreased endurance;Decreased balance  Assessment: Pt presents with impaired functional strength and endurance and decreased sitting balance impairing his ability to perform functional mobility safely and independently. Pt would benefit from acute PT to address deficits. Treatment Diagnosis: impaired functional mobility  Prognosis: Good  Decision Making: Medium Complexity  Clinical Presentation: evolving  PT Education: Goals;PT Role;Plan of Care;Precautions;General Safety;Orientation; Functional Mobility Training  Patient Education: d/c recommendations--pt verbalizing understanding-will require reinforcement  Barriers to Learning: cognition  REQUIRES PT FOLLOW UP: Yes  Activity Tolerance  Activity Tolerance: Patient limited by cognitive status       Patient Diagnosis(es): The primary encounter diagnosis was Frequent falls. Diagnoses of Altered mental status, unspecified altered mental status type, General weakness, and Dehydration were also pertinent to this visit. has a past medical history of Alcoholism ([facility] [address] Arthritis, Cardiac arrest ([facility] [address] Depression, Hyperlipidemia, Hypertension, PAF (paroxysmal atrial fibrillation) ([facility] [address] and tia. has a past surgical history that includes Cataract removal with implant (Bilateral); hernia repair; Colonoscopy; Endoscopy, colon, diagnostic; eye surgery; fracture surgery; and pacemaker placement. Restrictions  Restrictions/Precautions  Restrictions/Precautions: Fall Risk(HIGH FALL RISK)  Position Activity Restriction  Other position/activity restrictions: Denton Díaz is a 71 y.o. male who presents for evaluation of confusion and frequent falls. Patient states that ever since he got a pacemaker he has been confused. He states that he intermittently has hallucinations and states that he has fallen 727 times before in the past month but states that he thinks that it is June. He denies any injury from this. He has bruising and scabbed abrasions throughout with no new injury. No bony tenderness step-offs crepitus deformity or dislocation. He is ambulatory. He states that he does not know who called 911 or how he got here today. In his chart he does have history of alcoholism as well. He denies any recent drinking or illicit drug use.      PROPER PPE DONNED PRIOR TO ENTERING PATIENT'S ROOM (FACEMASK, GLOVES, AND FACESHIELD)     Vision/Hearing  Vision: Within Functional Limits  Hearing: Within functional limits       Subjective  General  Chart Reviewed: Yes  Response To Previous Treatment: Not applicable  Family / Caregiver Present: No  Diagnosis: AMS  General Comment  Comments: Pt supine in bed upon arrival.   Subjective  Subjective: Pt agreeable to Time In 1257         Time Out 1339         Minutes 42              Timed Code Treatment Minutes:   27    Total Treatment Minutes:  [address] [place], [place] [address]

## 2020-05-16 NOTE — ED NOTES
Report given to [facility]. All questions answered.       Joni Rolle RN  05/15/20 [id number]

## 2020-05-16 NOTE — PROGRESS NOTES
CLINICAL BEDSIDE SWALLOWING EVALUATION  Speech Therapy Department    Patient Name:  Sukhdeep Dsouza  :  1950  Pain level:denies   Medical Diagnosis:   Acute alteration in mental status [R41.82]  Acute alteration in mental status [R41.82]    HPI: \"69 y.o. male who presents for evaluation of confusion and frequent falls.  Patient states that ever since he got a pacemaker he has been confused. Janet Edwards states that he intermittently has hallucinations and states that he has fallen 727 times before in the past month but states that he thinks that it is . Janet Edwards denies any injury from this. Janet Edwards has bruising and scabbed abrasions throughout with no new injury.  No bony tenderness step-offs crepitus deformity or dislocation.  He is ambulatory. Janet Edwards states that he does not know who called 911 or how he got here today.  In his chart he does have history of alcoholism as well.  He denies any recent drinking or illicit drug use.  He denies any chest pain or shortness of breath.  No recent illness cough congestion runny nose.  No fevers or chills pain abdominal pain nausea vomiting or diarrhea.  No additional information is able to obtained at this time due to his confusion\"  CXR revealed:  Impression   1. No focal consolidation, pneumothorax, or significant pleural effusion.       2. Persistently enlarged but unchanged cardiomediastinal silhouette. SLP dysphagia evaluation orders received. Pt has been confused and intermittently agitated. RN reported pt appeared to tolerate sip of thin liquids with no overt clinical s/s of aspiration/penetration however, due to cognitive state evaluation was requested prior to diet initiation. Swallow evaluation was completed at outside hospital 3/2020 with recommendations for Dysphagia III Soft and Bite-Sized with thin liquids. Pt unable to provide hx. Treatment Diagnosis:  Dysphagia    Impressions: Pt was positioned upright in bed on RA.  He demonstrates confusion and intermittent difficulty following directions and sustaining attention to task. Oral mechanism examination revealed mildly decreased coordination. Black spot was noted on anterior lingual surface. Various consistency trials were provided to assess swallow function. Pt demonstrated prolonged/reduced mastication, reduced bolus control was suspected with delayed swallow initiation, laryngeal elevation was reduced. Pt with intermittent nasal air escape, ?palatal insufficiency. No overt clinical s/s of aspiration/penetration assessed this date. Pt demonstrates increased risk for aspiration due to cognitive status and suspected dysphagia recommend close monitoring. Dietary Recommendations:  Dysphagia III Soft and Bite-Sized with thin liquids, meds with puree     Strategies: 90 degree positioning with all p.o. intake; small bites/sips; alternate textures through meal; reduce rate of intake    Treatment/Goals: Speech therapy for dysphagia tx 3-5 times per week. ST.) Pt will tolerate recommended diet without s/s of aspiration     Oral motor Exam:  Dentition: adequate   Labial/Facial: decreased coordination   Lingual: decreased coordination, black spot on anterior lingual surface   Voice: WFL     Oral Phase:   Reduced mastication  Reduced A-P propulsion  Apparent premature bolus loss to pharynx    Pharyngeal Phase:  Apparent pharyngeal pooling  Delayed swallow initiation  Decreased laryngeal elevation via palpation   Apparent decreased pharyngeal clearing    Patient/Family Education:Education, results and recommendations given to the Pt and nurse, who verbalized understanding    Timed Code Treatment: 0 minutes     Total Treatment Time: 30 minutes     Discharge Recommendations: Speech Therapy for Speech/Dysphagia treatment at discharge. If patient discharges prior to next session this note will serve as a discharge summary.       Amanda Resides, [address] [facility]  Speech Language Pathologist

## 2020-05-16 NOTE — H&P
Allergies          EXAM: (2-7 system for EPF/Detailed, ?8 for Comprehensive)  BP (!) 135/97   Pulse 99   Temp 98.8 °F (37.1 °C)   Resp 26   SpO2 95%   Constitutional: vitals as above: alert and appears stated age  Head: Normocephalic, without obvious abnormality, atraumatic  Eyes:lids and lashes normal, conjunctivae and sclerae normal and pupils equal, round, reactive to light and accomodation  EMNT: external ears normal, lips normal  Neck: no adenopathy, supple, symmetrical, trachea midline and thyroid not enlarged, symmetric, no tenderness/mass/nodules   Respiratory: clear to auscultation without wheezes or rales  Cardiovascular: normal rate, regular rhythm, normal S1 and S2 and no gallops  Gastrointestinal: soft, non-tender, non-distended, normal bowel sounds, no masses or organomegaly  Lymphatic:   Extremities: no edema, no clubbing  Skin:No rashes or nodules noted.   Neurologic:    LABS:  Labs Reviewed   CBC WITH AUTO DIFFERENTIAL - Abnormal; Notable for the following components:       Result Value    RBC 3.66 (*)     Hemoglobin 12.3 (*)     Hematocrit 37.1 (*)     .5 (*)     RDW 20.4 (*)     Neutrophils Absolute 8.1 (*)     Lymphocytes Absolute 0.3 (*)     All other components within normal limits    Narrative:     Performed at:  [facility] [address], [place], [facility] [employer]   Phone [phone]   COMPREHENSIVE METABOLIC PANEL - Abnormal; Notable for the following components:    CO2 20 (*)     Anion Gap 21 (*)     Albumin/Globulin Ratio 0.9 (*)     Total Bilirubin 1.2 (*)     AST 93 (*)     All other components within normal limits    Narrative:     Performed at:  [facility]  [address] [facility], [facility] [employer]   Phone [phone]   TROPONIN - Abnormal; Notable for the following components:    Troponin 0.06 (*)     All other components within normal limits    Narrative:     Performed at:  [facility]  [address] [address] [facility]   Phone [phone]   ACETAMINOPHEN LEVEL - Abnormal; Notable for the following components:    Acetaminophen Level <5 (*)     All other components within normal limits    Narrative:     Performed at:  [facility] [address],  [place] [address] [facility]   Phone [phone]   SALICYLATE LEVEL - Abnormal; Notable for the following components:    Salicylate, Serum <5.0 (*)     All other components within normal limits    Narrative:     Performed at:  [facility] [address],  [place] [address] [facility]   Phone [phone]   LACTIC ACID, PLASMA - Abnormal; Notable for the following components:    Lactic Acid 2.1 (*)     All other components within normal limits    Narrative:     Performed at:  [facility] [address],  [place], [address] [facility]   Phone [phone]   CULTURE, BLOOD 1   CULTURE, BLOOD 2   ETHANOL    Narrative:     Performed at:  [facility] [address],  [place] [address] [facility]   Phone [phone]   AMMONIA    Narrative:     Performed at:  [facility] [address],  [place] [facility]   Phone [phone]   URINE RT REFLEX TO CULTURE   URINE DRUG SCREEN         IMAGING:  Imaging results from the ER have been reviewed in the computerized chart. Ct Head Wo Contrast    Result Date: 5/15/2020  EXAMINATION: CT OF THE HEAD WITHOUT CONTRAST  5/15/2020 5:17 pm TECHNIQUE: CT of the head was performed without the administration of intravenous contrast. Dose modulation, iterative reconstruction, and/or weight based adjustment of the mA/kV was utilized to reduce the radiation dose to as low as reasonably achievable. COMPARISON: 03/13/2020 HISTORY: ORDERING SYSTEM PROVIDED HISTORY: ams TECHNOLOGIST PROVIDED HISTORY: Reason for exam:->ams Has a \"code stroke\" or \"stroke alert\" been called? ->No Reason for Exam: Fall (Pt to ER from [facility] for mulitple falls, states possibly 30 falls in last few days. EMS found pt on ground with AMS, pt endorses frequent hallucinations and confusion FINDINGS: BRAIN/VENTRICLES: There is no acute intracranial hemorrhage, mass effect or midline shift. No abnormal extra-axial fluid collection. The gray-white differentiation is maintained without evidence of an acute infarct. There is prominence of the ventricles and sulci due to global parenchymal volume loss. There are nonspecific areas of hypoattenuation within the periventricular and subcortical white matter, which likely represent chronic microvascular ischemic change. Small area of gyral encephalomalacia superiorly in the mid right parietal lobe at the site of prior parenchymal hemorrhage. ORBITS: The visualized portion of the orbits demonstrate no acute abnormality. SINUSES: The visualized paranasal sinuses and mastoid air cells demonstrate no acute abnormality. SOFT TISSUES/SKULL: No acute abnormality of the visualized skull or soft tissues. No acute intracranial abnormality. Mild-to-moderate cerebral atrophy appropriate for age. Mild-to-moderate chronic ischemic changes also age-appropriate. Resolution of the small mid right parietal hematoma. Xr Chest Portable    Result Date: 5/15/2020  EXAMINATION: ONE XRAY VIEW OF THE CHEST 5/15/2020 4:45 pm COMPARISON: March 15, 2020. HISTORY: ORDERING SYSTEM PROVIDED HISTORY: SOB TECHNOLOGIST PROVIDED HISTORY: Reason for exam:->SOB Reason for Exam: Fall (Pt to ER from [facility] for mulitple falls, states possibly 30 falls in last few days. EMS found pt on ground with AMS, pt endorses frequent hallucinations and confusion) Acuity: Acute Type of Exam: Initial FINDINGS: Cardiac and mediastinal contours are enlarged but unchanged. Left chest wall pacemaker device appears in unchanged position. No focal consolidation.   No significant pleural effusion. No evidence of pneumothorax. No evidence of acute osseous abnormalities. 1. No focal consolidation, pneumothorax, or significant pleural effusion. 2. Persistently enlarged but unchanged cardiomediastinal silhouette. MEDICAL DECISION MAKING:    Principal Problem:    Acute alteration in mental status -New Problem to me. Cause unclear. Does not appear to have infection. cxr reviewed by self is clear. Ct head without abnormality  Plan: admit, workup for poss TIA. Try to get MRI done  Active Problems:     HTN (hypertension), benign -Established problem. Stable. 135/97  Plan: Pt home BP meds reviewed and will be continued. IV Hydralazine ordered for control of extremely high blood pressures   Will monitor labs to assess Creat/K for possible complications of medications. Anemia -Established problem. Stable.  hgb 12. 3. cause unclear  Plan: No indication for transfusion. Cont to monitor h/h to assess progression of anemia. Recommend ferrous sulfate or MVI as outpatient. Elevated troponin -Established problem. Stable. Plan: stable based on chart review; no further workup if no sx    Failure to thrive in adult -Established problem. Needs pt/ot eval  Plan: likely needs SNF          Diagnoses as listed above, designated as new or established and plan outlined for each. Data Reviewed:   (1) Lab tests were reviewed or ordered. (1) Radiology tests were reviewed or ordered. (1) Medical test (Echo, EKG, PFT/charles) were ordered. (1)History was not obtained from someone other than patient  (1) Old records  were reviewed - see HPI/MDM for pertinent details if review done. (2) Case wasdiscussed with another health care provider: Katherine SANDOVAL  (2) Imaging was viewed by myself. (2) EKG  was viewed by myself.        The patient isbeing placed in inpatient status with the expectation of requiring a hospital stay spanning at least two midnights for care and treatment of the problems noted in the problem list.  This determination is also based on thepatients comorbidities and past medical history, the severity and timing of the signs and symptoms upon presentation.         Electronically signed by: Thomas Young 5/15/2020

## 2020-05-16 NOTE — PROGRESS NOTES
within reach; Bed alarm in place; Patient at risk for falls           Patient Diagnosis(es): The primary encounter diagnosis was Frequent falls. Diagnoses of Altered mental status, unspecified altered mental status type, General weakness, and Dehydration were also pertinent to this visit. has a past medical history of Alcoholism ([facility] [address] Arthritis, Cardiac arrest ([facility] [address] Depression, Hyperlipidemia, Hypertension, PAF (paroxysmal atrial fibrillation) ([facility] [address] and tia. has a past surgical history that includes Cataract removal with implant (Bilateral); hernia repair; Colonoscopy; Endoscopy, colon, diagnostic; eye surgery; fracture surgery; and pacemaker placement. Restrictions  Restrictions/Precautions  Restrictions/Precautions: Fall Risk(high fall risk )  Position Activity Restriction  Other position/activity restrictions: Hussein Ravi is a 71 y.o. male who presents for evaluation of confusion and frequent falls. Patient states that ever since he got a pacemaker he has been confused. He states that he intermittently has hallucinations and states that he has fallen 727 times before in the past month but states that he thinks that it is June. He denies any injury from this. He has bruising and scabbed abrasions throughout with no new injury. No bony tenderness step-offs crepitus deformity or dislocation. He is ambulatory. He states that he does not know who called 911 or how he got here today. In his chart he does have history of alcoholism as well. He denies any recent drinking or illicit drug use. Subjective   General  Chart Reviewed: Yes, Progress Notes, History and Physical, Imaging, Labs  Patient assessed for rehabilitation services?: Yes  Family / Caregiver Present: No  Referring Practitioner: Andrey Longo MD  Diagnosis: Altered Mental Status   Subjective  Subjective: Patient pleasant, but confused. Patient noted to be having hallucinations throughout evaluation.    General Comment  Comments: RN okay for therapy.  RN reported patient required ativan due to combative behavior   Patient Currently in Pain: Yes  Pain Assessment  Pain Assessment: 0-10  Pain Level: 3  Vital Signs  Temp: 98.1 °F (36.7 °C)  Temp Source: Oral  Pulse: 101  Heart Rate Source: Monitor  Resp: 19  BP: (!) 166/89  BP Location: Right upper arm  BP Upper/Lower: Upper  MAP (mmHg): 115  Patient Position: Semi fowlers  Level of Consciousness: Alert  MEWS Score: 2  Patient Currently in Pain: Yes  Oxygen Therapy  SpO2: 95 %  Pulse Oximeter Device Mode: Intermittent  Pulse Oximeter Device Location: Finger  O2 Device: None (Room air)  Social/Functional History  Social/Functional History  Lives With: Alone  Type of Home: Facility([facility])  Home Layout: One level  Home Access: Level entry  Bathroom Shower/Tub: Walk-in shower  Bathroom Toilet: Handicap height  Bathroom Equipment: Grab bars in shower, Grab bars around toilet, Shower chair, Hand-held shower  Home Equipment: Standard walker, Quad cane  ADL Assistance: Independent  Homemaking Assistance: (carries meals from dining room back to his room, indep with laundry and cleaning)  Ambulation Assistance: Independent(with walker or QC)  Transfer Assistance: Independent  Active : No  Patient's  Info: uses transport provided by facility or [facility]  Occupation: Retired  Type of occupation: 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

## 2020-05-16 NOTE — FLOWSHEET NOTE
4 Eyes Skin Assessment     The patient is being assess for  Admission    I agree that 2 RN's have performed a thorough Head to Toe Skin Assessment on the patient. ALL assessment sites listed below have been assessed. Areas assessed by both nurses: Tesfaye RN/  [x]   Head, Face, and Ears   [x]   Shoulders, Back, and Chest  [x]   Arms, Elbows, and Hands   [x]   Coccyx, Sacrum, and IschIum  [x]   Legs, Feet, and Heels         Does the Patient have Skin Breakdown?   Yes LDA WOUND CARE was Initiated documentation include the Arin-wound, Wound Assessment, Measurements, Dressing Treatment, Drainage, and Color\",         Elvin Prevention initiated:  Yes   Wound Care Orders initiated:  No      WOC nurse consulted for Pressure Injury (Stage 3,4, Unstageable, DTI, NWPT, and Complex wounds), New and Established Ostomies:  Yes      Nurse 1 eSignature: Electronically signed by Lalo Harmon RN on 5/15/20 at 2155  **SHARE this note so that the co-signing nurse is able to place an eSignature**    Nurse 2 eSignature: Electronically signed by Fatemeh Vick RN on 5/16/20 at 8:09 AM EDT

## 2020-05-16 NOTE — DISCHARGE INSTR - COC
Good    Recommended Labs or Other Treatments After Discharge: SNF    Physician Certification: I certify the above information and transfer of Frieda Faust  is necessary for the continuing treatment of the diagnosis listed and that he requires [facility] for greater 30 days.      Update Admission H&P: No change in H&P    PHYSICIAN SIGNATURE:  Electronically signed by Bryan Leventhal, MD on 5/19/20 at 7:46 AM EDT

## 2020-05-17 LAB
ALBUMIN SERPL-MCNC: 3.2 G/DL (ref 3.4–5)
ALP BLD-CCNC: 79 U/L (ref 40–129)
ALT SERPL-CCNC: 27 U/L (ref 10–40)
ANION GAP SERPL CALCULATED.3IONS-SCNC: 14 MMOL/L (ref 3–16)
AST SERPL-CCNC: 54 U/L (ref 15–37)
BASOPHILS ABSOLUTE: 0 K/UL (ref 0–0.2)
BASOPHILS RELATIVE PERCENT: 0.6 %
BILIRUB SERPL-MCNC: 1 MG/DL (ref 0–1)
BILIRUBIN DIRECT: 0.3 MG/DL (ref 0–0.3)
BILIRUBIN, INDIRECT: 0.7 MG/DL (ref 0–1)
BUN BLDV-MCNC: 16 MG/DL (ref 7–20)
CALCIUM SERPL-MCNC: 9 MG/DL (ref 8.3–10.6)
CHLORIDE BLD-SCNC: 104 MMOL/L (ref 99–110)
CO2: 26 MMOL/L (ref 21–32)
CREAT SERPL-MCNC: 0.8 MG/DL (ref 0.8–1.3)
EOSINOPHILS ABSOLUTE: 0.1 K/UL (ref 0–0.6)
EOSINOPHILS RELATIVE PERCENT: 1.2 %
ESTIMATED AVERAGE GLUCOSE: 99.7 MG/DL
GFR AFRICAN AMERICAN: >60
GFR NON-AFRICAN AMERICAN: >60
GLUCOSE BLD-MCNC: 106 MG/DL (ref 70–99)
HBA1C MFR BLD: 5.1 %
HCT VFR BLD CALC: 36.6 % (ref 40.5–52.5)
HEMOGLOBIN: 12.2 G/DL (ref 13.5–17.5)
LYMPHOCYTES ABSOLUTE: 0.5 K/UL (ref 1–5.1)
LYMPHOCYTES RELATIVE PERCENT: 8.3 %
MCH RBC QN AUTO: 33.5 PG (ref 26–34)
MCHC RBC AUTO-ENTMCNC: 33.4 G/DL (ref 31–36)
MCV RBC AUTO: 100 FL (ref 80–100)
MONOCYTES ABSOLUTE: 1.2 K/UL (ref 0–1.3)
MONOCYTES RELATIVE PERCENT: 18.2 %
NEUTROPHILS ABSOLUTE: 4.6 K/UL (ref 1.7–7.7)
NEUTROPHILS RELATIVE PERCENT: 71.7 %
PDW BLD-RTO: 19.5 % (ref 12.4–15.4)
PLATELET # BLD: 190 K/UL (ref 135–450)
PMV BLD AUTO: 7.8 FL (ref 5–10.5)
POTASSIUM SERPL-SCNC: 2.9 MMOL/L (ref 3.5–5.1)
RBC # BLD: 3.66 M/UL (ref 4.2–5.9)
SODIUM BLD-SCNC: 144 MMOL/L (ref 136–145)
TOTAL PROTEIN: 6.8 G/DL (ref 6.4–8.2)
WBC # BLD: 6.5 K/UL (ref 4–11)

## 2020-05-17 PROCEDURE — 80048 BASIC METABOLIC PNL TOTAL CA: CPT

## 2020-05-17 PROCEDURE — 2060000000 HC ICU INTERMEDIATE R&B

## 2020-05-17 PROCEDURE — 2580000003 HC RX 258: Performed by: INTERNAL MEDICINE

## 2020-05-17 PROCEDURE — 85025 COMPLETE CBC W/AUTO DIFF WBC: CPT

## 2020-05-17 PROCEDURE — 6370000000 HC RX 637 (ALT 250 FOR IP): Performed by: INTERNAL MEDICINE

## 2020-05-17 PROCEDURE — 80076 HEPATIC FUNCTION PANEL: CPT

## 2020-05-17 PROCEDURE — 6360000002 HC RX W HCPCS: Performed by: INTERNAL MEDICINE

## 2020-05-17 PROCEDURE — 36415 COLL VENOUS BLD VENIPUNCTURE: CPT

## 2020-05-17 RX ORDER — HYDROCODONE BITARTRATE AND ACETAMINOPHEN 5; 325 MG/1; MG/1
1 TABLET ORAL EVERY 6 HOURS PRN
Status: DISCONTINUED | OUTPATIENT
Start: 2020-05-17 | End: 2020-05-20 | Stop reason: HOSPADM

## 2020-05-17 RX ADMIN — SERTRALINE 50 MG: 50 TABLET, FILM COATED ORAL at 07:47

## 2020-05-17 RX ADMIN — HYDROCODONE BITARTRATE AND ACETAMINOPHEN 1 TABLET: 5; 325 TABLET ORAL at 14:52

## 2020-05-17 RX ADMIN — Medication 10 ML: at 05:20

## 2020-05-17 RX ADMIN — Medication 10 ML: at 22:08

## 2020-05-17 RX ADMIN — NIFEDIPINE 30 MG: 30 TABLET, EXTENDED RELEASE ORAL at 07:47

## 2020-05-17 RX ADMIN — POTASSIUM CHLORIDE 10 MEQ: 7.46 INJECTION, SOLUTION INTRAVENOUS at 08:54

## 2020-05-17 RX ADMIN — PANTOPRAZOLE SODIUM 40 MG: 40 TABLET, DELAYED RELEASE ORAL at 07:47

## 2020-05-17 RX ADMIN — POTASSIUM CHLORIDE 10 MEQ: 7.46 INJECTION, SOLUTION INTRAVENOUS at 07:01

## 2020-05-17 RX ADMIN — HYDRALAZINE HYDROCHLORIDE 10 MG: 20 INJECTION INTRAMUSCULAR; INTRAVENOUS at 05:19

## 2020-05-17 RX ADMIN — POTASSIUM CHLORIDE 10 MEQ: 7.46 INJECTION, SOLUTION INTRAVENOUS at 09:57

## 2020-05-17 RX ADMIN — ASPIRIN 81 MG: 81 TABLET, COATED ORAL at 07:47

## 2020-05-17 RX ADMIN — METOPROLOL SUCCINATE 50 MG: 50 TABLET, EXTENDED RELEASE ORAL at 07:47

## 2020-05-17 RX ADMIN — POTASSIUM CHLORIDE 10 MEQ: 7.46 INJECTION, SOLUTION INTRAVENOUS at 07:52

## 2020-05-17 RX ADMIN — POTASSIUM CHLORIDE 10 MEQ: 7.46 INJECTION, SOLUTION INTRAVENOUS at 11:01

## 2020-05-17 RX ADMIN — POTASSIUM CHLORIDE 10 MEQ: 7.46 INJECTION, SOLUTION INTRAVENOUS at 12:10

## 2020-05-17 RX ADMIN — ENOXAPARIN SODIUM 40 MG: 40 INJECTION SUBCUTANEOUS at 07:48

## 2020-05-17 RX ADMIN — TRAZODONE HYDROCHLORIDE 50 MG: 50 TABLET ORAL at 21:30

## 2020-05-17 ASSESSMENT — PAIN SCALES - GENERAL
PAINLEVEL_OUTOF10: 0
PAINLEVEL_OUTOF10: 10
PAINLEVEL_OUTOF10: 0
PAINLEVEL_OUTOF10: 0

## 2020-05-17 NOTE — PROGRESS NOTES
Progress Note - Dr. Afshan St - Internal Medicine  PCP: No primary care provider on file. No primary physician on file. None    Hospital Day: 2  Code Status: Full Code  Current Diet: DIET DYSPHAGIA SOFT AND BITE-SIZED;        CC: follow up on medical issues    Subjective:   Sisi Dominguez is a 71 y.o. male. He denies problems    Doing ok    Still await MRI    No sign of infection  Pt still confused      K low this am  KCl iv replacement ordered      He denies chest pain, denies shortness of breath, denies nausea,  denies emesis. 10 system Review of Systems is reviewed with patient, and pertinent positives are listed here: None . Otherwise, Review of systems is negative. I have reviewed the patient's medical and social history in detail and updated the computerized patient record. To recap: He  has a past medical history of Alcoholism ([facility] [address] Arthritis, Cardiac arrest ([facility] [address] Depression, Hyperlipidemia, Hypertension, PAF (paroxysmal atrial fibrillation) ([facility] [address] and tia. Estevan Bateman He  has a past surgical history that includes Cataract removal with implant (Bilateral); hernia repair; Colonoscopy; Endoscopy, colon, diagnostic; eye surgery; fracture surgery; and pacemaker placement. Estevan Bateman He  reports that he has never smoked. He has never used smokeless tobacco. He reports current alcohol use of about 2.0 standard drinks of alcohol per week. He reports that he does not use drugs. .        Active Hospital Problems    Diagnosis Date Noted    Anemia [D64.9] 05/15/2020    Elevated troponin [R79.89] 05/15/2020    Failure to thrive in adult [R62.7] 05/15/2020    Acute alteration in mental status [R41.82] 05/15/2020    Essential hypertension [I10] 03/11/2020    Hypokalemia [E87.6]     HTN (hypertension), benign [I10] 12/05/2019       Current Facility-Administered Medications: LORazepam (ATIVAN) injection 1 mg, 1 mg, Intravenous, Q4H PRN  metoprolol succinate (TOPROL XL) extended release tablet 50 mg, 50 mg, Oral, Daily  NIFEdipine (PROCARDIA XL) extended release tablet 30 mg, 30 mg, Oral, Daily  pantoprazole (PROTONIX) tablet 40 mg, 40 mg, Oral, Daily  sertraline (ZOLOFT) tablet 50 mg, 50 mg, Oral, Daily  traZODone (DESYREL) tablet 50 mg, 50 mg, Oral, Nightly  sodium chloride flush 0.9 % injection 10 mL, 10 mL, Intravenous, 2 times per day  sodium chloride flush 0.9 % injection 10 mL, 10 mL, Intravenous, PRN  magnesium hydroxide (MILK OF MAGNESIA) 400 MG/5ML suspension 30 mL, 30 mL, Oral, Daily PRN  promethazine (PHENERGAN) tablet 12.5 mg, 12.5 mg, Oral, Q6H PRN **OR** ondansetron (ZOFRAN) injection 4 mg, 4 mg, Intravenous, Q6H PRN  enoxaparin (LOVENOX) injection 40 mg, 40 mg, Subcutaneous, Daily  aspirin EC tablet 81 mg, 81 mg, Oral, Daily **OR** aspirin suppository 300 mg, 300 mg, Rectal, Daily  labetalol (NORMODYNE;TRANDATE) injection 10 mg, 10 mg, Intravenous, Q10 Min PRN  potassium chloride (KLOR-CON M) extended release tablet 40 mEq, 40 mEq, Oral, PRN **OR** potassium bicarb-citric acid (EFFER-K) effervescent tablet 40 mEq, 40 mEq, Oral, PRN **OR** potassium chloride 10 mEq/100 mL IVPB (Peripheral Line), 10 mEq, Intravenous, PRN  0.9 % sodium chloride bolus, 500 mL, Intravenous, PRN  hydrALAZINE (APRESOLINE) injection 10 mg, 10 mg, Intravenous, Q6H PRN         Objective:  /83   Pulse 101   Temp 98.7 °F (37.1 °C) (Oral)   Resp 18   Ht 5' 8\" (1.727 m) Comment: per pt report  Wt 171 lb 8.3 oz (77.8 kg)   SpO2 94%   BMI 26.08 kg/m²      Patient Vitals for the past 24 hrs:   BP Temp Temp src Pulse Resp SpO2   05/17/20 0745 137/83 98.7 °F (37.1 °C) Oral 101 18 94 %   05/17/20 0613 (!) 162/87 -- -- 102 20 --   05/17/20 0516 (!) 196/98 98.5 °F (36.9 °C) Oral 90 20 94 %   05/17/20 0349 -- -- -- 94 -- --   05/17/20 0014 (!) 164/111 98.3 °F (36.8 °C) Axillary 76 20 96 %   05/16/20 2215 (!) 179/98 -- -- 99 -- --   05/16/20 2154 -- -- -- 92 -- --   05/16/20 2130 (!) 206/111 97.8 °F (36.6 °C) Oral 87 20 --   05/16/20 2125 (!) 214/110 -- -- 87 20 97 %   05/16/20 1515 (!) 166/89 98.1 °F (36.7 °C) Oral 101 19 95 %   05/16/20 1115 (!) 166/85 98.2 °F (36.8 °C) Temporal 103 20 97 %     Patient Vitals for the past 96 hrs (Last 3 readings):   Weight   05/15/20 2155 171 lb 8.3 oz (77.8 kg)           Intake/Output Summary (Last 24 hours) at 5/17/2020 9453  Last data filed at 5/17/2020 0755  Gross per 24 hour   Intake 1320 ml   Output 475 ml   Net 845 ml         Physical Exam:   S1, S2 normal, no murmur, rub or gallop, regular rate and rhythm  clear to auscultation bilaterally  abdomen is soft without significant tenderness, masses, organomegaly or guarding  extremities normal, atraumatic, no cyanosis or edema    Labs:  Lab Results   Component Value Date    WBC 6.5 05/17/2020    HGB 12.2 (L) 05/17/2020    HCT 36.6 (L) 05/17/2020     05/17/2020    CHOL 115 05/16/2020    TRIG 89 05/16/2020    HDL 33 (L) 05/16/2020    ALT 27 05/17/2020    AST 54 (H) 05/17/2020     05/17/2020    K 2.9 (LL) 05/17/2020     05/17/2020    CREATININE 0.8 05/17/2020    BUN 16 05/17/2020    CO2 26 05/17/2020    INR 1.18 (H) 03/12/2020    LABA1C 5.5 03/12/2020    LABMICR YES 05/15/2020     Lab Results   Component Value Date    CKTOTAL 296 03/11/2020    TROPONINI 0.06 (H) 05/15/2020       Recent Imaging Results are Reviewed:  Ct Head Wo Contrast    Result Date: 5/15/2020  EXAMINATION: CT OF THE HEAD WITHOUT CONTRAST  5/15/2020 5:17 pm TECHNIQUE: CT of the head was performed without the administration of intravenous contrast. Dose modulation, iterative reconstruction, and/or weight based adjustment of the mA/kV was utilized to reduce the radiation dose to as low as reasonably achievable. COMPARISON: 03/13/2020 HISTORY: ORDERING SYSTEM PROVIDED HISTORY: ams TECHNOLOGIST PROVIDED HISTORY: Reason for exam:->ams Has a \"code stroke\" or \"stroke alert\" been called? ->No Reason for Exam: Fall (Pt to ER from [facility] for mulitple falls, states possibly 30 falls in last few days. EMS found pt on ground with AMS, pt endorses frequent hallucinations and confusion FINDINGS: BRAIN/VENTRICLES: There is no acute intracranial hemorrhage, mass effect or midline shift. No abnormal extra-axial fluid collection. The gray-white differentiation is maintained without evidence of an acute infarct. There is prominence of the ventricles and sulci due to global parenchymal volume loss. There are nonspecific areas of hypoattenuation within the periventricular and subcortical white matter, which likely represent chronic microvascular ischemic change. Small area of gyral encephalomalacia superiorly in the mid right parietal lobe at the site of prior parenchymal hemorrhage. ORBITS: The visualized portion of the orbits demonstrate no acute abnormality. SINUSES: The visualized paranasal sinuses and mastoid air cells demonstrate no acute abnormality. SOFT TISSUES/SKULL: No acute abnormality of the visualized skull or soft tissues. No acute intracranial abnormality. Mild-to-moderate cerebral atrophy appropriate for age. Mild-to-moderate chronic ischemic changes also age-appropriate. Resolution of the small mid right parietal hematoma. Xr Chest Portable    Result Date: 5/15/2020  EXAMINATION: ONE XRAY VIEW OF THE CHEST 5/15/2020 4:45 pm COMPARISON: March 15, 2020. HISTORY: ORDERING SYSTEM PROVIDED HISTORY: SOB TECHNOLOGIST PROVIDED HISTORY: Reason for exam:->SOB Reason for Exam: Fall (Pt to ER from [facility] for mulitple falls, states possibly 30 falls in last few days. EMS found pt on ground with AMS, pt endorses frequent hallucinations and confusion) Acuity: Acute Type of Exam: Initial FINDINGS: Cardiac and mediastinal contours are enlarged but unchanged. Left chest wall pacemaker device appears in unchanged position. No focal consolidation. No significant pleural effusion. No evidence of pneumothorax. No evidence of acute osseous abnormalities.

## 2020-05-17 NOTE — PLAN OF CARE
Problem: Cardiovascular  Goal: Hemodynamic stability  Outcome: Ongoing   Potassium IVPB started at 0701 per protocol orders & pt given OJ. See [facility] & all flowsheets.

## 2020-05-18 ENCOUNTER — APPOINTMENT (OUTPATIENT)
Dept: MRI IMAGING | Age: 70
DRG: 948 | End: 2020-05-18
Payer: MEDICARE

## 2020-05-18 LAB
ANION GAP SERPL CALCULATED.3IONS-SCNC: 11 MMOL/L (ref 3–16)
BASOPHILS ABSOLUTE: 0 K/UL (ref 0–0.2)
BASOPHILS RELATIVE PERCENT: 0.9 %
BUN BLDV-MCNC: 17 MG/DL (ref 7–20)
CALCIUM SERPL-MCNC: 8.4 MG/DL (ref 8.3–10.6)
CHLORIDE BLD-SCNC: 102 MMOL/L (ref 99–110)
CO2: 26 MMOL/L (ref 21–32)
CREAT SERPL-MCNC: 0.8 MG/DL (ref 0.8–1.3)
EOSINOPHILS ABSOLUTE: 0.1 K/UL (ref 0–0.6)
EOSINOPHILS RELATIVE PERCENT: 2.2 %
GFR AFRICAN AMERICAN: >60
GFR NON-AFRICAN AMERICAN: >60
GLUCOSE BLD-MCNC: 116 MG/DL (ref 70–99)
HCT VFR BLD CALC: 35.5 % (ref 40.5–52.5)
HEMOGLOBIN: 11.9 G/DL (ref 13.5–17.5)
LYMPHOCYTES ABSOLUTE: 0.6 K/UL (ref 1–5.1)
LYMPHOCYTES RELATIVE PERCENT: 11.2 %
MCH RBC QN AUTO: 33.5 PG (ref 26–34)
MCHC RBC AUTO-ENTMCNC: 33.5 G/DL (ref 31–36)
MCV RBC AUTO: 99.7 FL (ref 80–100)
MONOCYTES ABSOLUTE: 1.2 K/UL (ref 0–1.3)
MONOCYTES RELATIVE PERCENT: 22.9 %
NEUTROPHILS ABSOLUTE: 3.4 K/UL (ref 1.7–7.7)
NEUTROPHILS RELATIVE PERCENT: 62.8 %
PDW BLD-RTO: 19.2 % (ref 12.4–15.4)
PLATELET # BLD: 206 K/UL (ref 135–450)
PMV BLD AUTO: 8 FL (ref 5–10.5)
POTASSIUM SERPL-SCNC: 3 MMOL/L (ref 3.5–5.1)
RBC # BLD: 3.55 M/UL (ref 4.2–5.9)
SODIUM BLD-SCNC: 139 MMOL/L (ref 136–145)
WBC # BLD: 5.4 K/UL (ref 4–11)

## 2020-05-18 PROCEDURE — 6370000000 HC RX 637 (ALT 250 FOR IP): Performed by: INTERNAL MEDICINE

## 2020-05-18 PROCEDURE — 94760 N-INVAS EAR/PLS OXIMETRY 1: CPT

## 2020-05-18 PROCEDURE — 2060000000 HC ICU INTERMEDIATE R&B

## 2020-05-18 PROCEDURE — 80048 BASIC METABOLIC PNL TOTAL CA: CPT

## 2020-05-18 PROCEDURE — 36415 COLL VENOUS BLD VENIPUNCTURE: CPT

## 2020-05-18 PROCEDURE — 2580000003 HC RX 258: Performed by: INTERNAL MEDICINE

## 2020-05-18 PROCEDURE — 70551 MRI BRAIN STEM W/O DYE: CPT

## 2020-05-18 PROCEDURE — 6360000002 HC RX W HCPCS: Performed by: INTERNAL MEDICINE

## 2020-05-18 PROCEDURE — 85025 COMPLETE CBC W/AUTO DIFF WBC: CPT

## 2020-05-18 RX ORDER — POTASSIUM CHLORIDE 20 MEQ/1
20 TABLET, EXTENDED RELEASE ORAL 2 TIMES DAILY WITH MEALS
Status: DISCONTINUED | OUTPATIENT
Start: 2020-05-18 | End: 2020-05-20 | Stop reason: HOSPADM

## 2020-05-18 RX ORDER — SKIN PROTECTANT 44 G/100G
OINTMENT TOPICAL 2 TIMES DAILY PRN
Status: DISCONTINUED | OUTPATIENT
Start: 2020-05-18 | End: 2020-05-20 | Stop reason: HOSPADM

## 2020-05-18 RX ORDER — MECLIZINE HCL 12.5 MG/1
25 TABLET ORAL EVERY 6 HOURS PRN
Status: DISCONTINUED | OUTPATIENT
Start: 2020-05-18 | End: 2020-05-20 | Stop reason: HOSPADM

## 2020-05-18 RX ADMIN — ENOXAPARIN SODIUM 40 MG: 40 INJECTION SUBCUTANEOUS at 09:04

## 2020-05-18 RX ADMIN — NIFEDIPINE 30 MG: 30 TABLET, EXTENDED RELEASE ORAL at 09:05

## 2020-05-18 RX ADMIN — POTASSIUM CHLORIDE 20 MEQ: 1500 TABLET, EXTENDED RELEASE ORAL at 12:56

## 2020-05-18 RX ADMIN — TRAZODONE HYDROCHLORIDE 50 MG: 50 TABLET ORAL at 21:29

## 2020-05-18 RX ADMIN — METOPROLOL SUCCINATE 50 MG: 50 TABLET, EXTENDED RELEASE ORAL at 09:04

## 2020-05-18 RX ADMIN — MECLIZINE 25 MG: 12.5 TABLET ORAL at 18:26

## 2020-05-18 RX ADMIN — POTASSIUM CHLORIDE 20 MEQ: 1500 TABLET, EXTENDED RELEASE ORAL at 18:20

## 2020-05-18 RX ADMIN — PANTOPRAZOLE SODIUM 40 MG: 40 TABLET, DELAYED RELEASE ORAL at 09:05

## 2020-05-18 RX ADMIN — HYDROCODONE BITARTRATE AND ACETAMINOPHEN 1 TABLET: 5; 325 TABLET ORAL at 09:21

## 2020-05-18 RX ADMIN — ASPIRIN 81 MG: 81 TABLET, COATED ORAL at 09:05

## 2020-05-18 RX ADMIN — Medication 10 ML: at 09:04

## 2020-05-18 RX ADMIN — ANORECTAL OINTMENT: 15.7; .44; 24; 20.6 OINTMENT TOPICAL at 16:10

## 2020-05-18 RX ADMIN — Medication 10 ML: at 22:27

## 2020-05-18 RX ADMIN — SERTRALINE 50 MG: 50 TABLET, FILM COATED ORAL at 09:05

## 2020-05-18 ASSESSMENT — PAIN SCALES - GENERAL
PAINLEVEL_OUTOF10: 0
PAINLEVEL_OUTOF10: 0
PAINLEVEL_OUTOF10: 7
PAINLEVEL_OUTOF10: 7

## 2020-05-18 ASSESSMENT — PAIN DESCRIPTION - FREQUENCY: FREQUENCY: CONTINUOUS

## 2020-05-18 ASSESSMENT — PAIN DESCRIPTION - PROGRESSION
CLINICAL_PROGRESSION: NOT CHANGED

## 2020-05-18 ASSESSMENT — PAIN DESCRIPTION - LOCATION: LOCATION: MOUTH

## 2020-05-18 ASSESSMENT — PAIN DESCRIPTION - PAIN TYPE: TYPE: ACUTE PAIN

## 2020-05-18 ASSESSMENT — PAIN DESCRIPTION - ONSET: ONSET: ON-GOING

## 2020-05-18 ASSESSMENT — PAIN DESCRIPTION - DESCRIPTORS: DESCRIPTORS: THROBBING

## 2020-05-18 NOTE — PLAN OF CARE
Pt c/o tongue pain/discomfort, visible sore noted. Given prn [place] 5/325 at 2375. Bed alarm functioning, bedside commode and call light in reach. Pt voices understanding to call for help with transfers. Accepted meds whole one at a time w/o difficulty.

## 2020-05-18 NOTE — PROGRESS NOTES
Speech Language Pathology  Attempt   Elsie Mixon   1950     Attempted to see pt for speech/dysphagia therapy follow-up. Pt leaving floor with transport at time of attempt. Will re-attempt to follow-up as therapy schedule allows.     Thanks,  Elo Guerrero, [facility]  Speech Language Pathologist

## 2020-05-19 LAB
ANION GAP SERPL CALCULATED.3IONS-SCNC: 9 MMOL/L (ref 3–16)
BASOPHILS ABSOLUTE: 0 K/UL (ref 0–0.2)
BASOPHILS RELATIVE PERCENT: 0.9 %
BLOOD CULTURE, ROUTINE: NORMAL
BUN BLDV-MCNC: 14 MG/DL (ref 7–20)
CALCIUM SERPL-MCNC: 9.1 MG/DL (ref 8.3–10.6)
CHLORIDE BLD-SCNC: 103 MMOL/L (ref 99–110)
CO2: 27 MMOL/L (ref 21–32)
CREAT SERPL-MCNC: 0.8 MG/DL (ref 0.8–1.3)
CULTURE, BLOOD 2: NORMAL
EOSINOPHILS ABSOLUTE: 0.1 K/UL (ref 0–0.6)
EOSINOPHILS RELATIVE PERCENT: 2.5 %
GFR AFRICAN AMERICAN: >60
GFR NON-AFRICAN AMERICAN: >60
GLUCOSE BLD-MCNC: 109 MG/DL (ref 70–99)
HCT VFR BLD CALC: 37.4 % (ref 40.5–52.5)
HEMOGLOBIN: 12.4 G/DL (ref 13.5–17.5)
LYMPHOCYTES ABSOLUTE: 0.7 K/UL (ref 1–5.1)
LYMPHOCYTES RELATIVE PERCENT: 13.7 %
MCH RBC QN AUTO: 33.3 PG (ref 26–34)
MCHC RBC AUTO-ENTMCNC: 33 G/DL (ref 31–36)
MCV RBC AUTO: 101 FL (ref 80–100)
MONOCYTES ABSOLUTE: 1.4 K/UL (ref 0–1.3)
MONOCYTES RELATIVE PERCENT: 27.9 %
NEUTROPHILS ABSOLUTE: 2.9 K/UL (ref 1.7–7.7)
NEUTROPHILS RELATIVE PERCENT: 55 %
PDW BLD-RTO: 18.7 % (ref 12.4–15.4)
PLATELET # BLD: 242 K/UL (ref 135–450)
PMV BLD AUTO: 7.8 FL (ref 5–10.5)
POTASSIUM SERPL-SCNC: 3.8 MMOL/L (ref 3.5–5.1)
RBC # BLD: 3.71 M/UL (ref 4.2–5.9)
SODIUM BLD-SCNC: 139 MMOL/L (ref 136–145)
WBC # BLD: 5.2 K/UL (ref 4–11)

## 2020-05-19 PROCEDURE — 97530 THERAPEUTIC ACTIVITIES: CPT

## 2020-05-19 PROCEDURE — 80048 BASIC METABOLIC PNL TOTAL CA: CPT

## 2020-05-19 PROCEDURE — 36415 COLL VENOUS BLD VENIPUNCTURE: CPT

## 2020-05-19 PROCEDURE — 6370000000 HC RX 637 (ALT 250 FOR IP): Performed by: INTERNAL MEDICINE

## 2020-05-19 PROCEDURE — U0003 INFECTIOUS AGENT DETECTION BY NUCLEIC ACID (DNA OR RNA); SEVERE ACUTE RESPIRATORY SYNDROME CORONAVIRUS 2 (SARS-COV-2) (CORONAVIRUS DISEASE [COVID-19]), AMPLIFIED PROBE TECHNIQUE, MAKING USE OF HIGH THROUGHPUT TECHNOLOGIES AS DESCRIBED BY CMS-2020-01-R: HCPCS

## 2020-05-19 PROCEDURE — 85025 COMPLETE CBC W/AUTO DIFF WBC: CPT

## 2020-05-19 PROCEDURE — 97116 GAIT TRAINING THERAPY: CPT

## 2020-05-19 PROCEDURE — 2060000000 HC ICU INTERMEDIATE R&B

## 2020-05-19 PROCEDURE — 97129 THER IVNTJ 1ST 15 MIN: CPT

## 2020-05-19 PROCEDURE — 92526 ORAL FUNCTION THERAPY: CPT

## 2020-05-19 PROCEDURE — 6360000002 HC RX W HCPCS: Performed by: INTERNAL MEDICINE

## 2020-05-19 PROCEDURE — 2580000003 HC RX 258: Performed by: INTERNAL MEDICINE

## 2020-05-19 RX ADMIN — POTASSIUM CHLORIDE 20 MEQ: 1500 TABLET, EXTENDED RELEASE ORAL at 08:08

## 2020-05-19 RX ADMIN — METOPROLOL SUCCINATE 50 MG: 50 TABLET, EXTENDED RELEASE ORAL at 08:09

## 2020-05-19 RX ADMIN — PANTOPRAZOLE SODIUM 40 MG: 40 TABLET, DELAYED RELEASE ORAL at 08:09

## 2020-05-19 RX ADMIN — SERTRALINE 50 MG: 50 TABLET, FILM COATED ORAL at 08:08

## 2020-05-19 RX ADMIN — POTASSIUM CHLORIDE 20 MEQ: 1500 TABLET, EXTENDED RELEASE ORAL at 16:08

## 2020-05-19 RX ADMIN — Medication 10 ML: at 21:10

## 2020-05-19 RX ADMIN — ANORECTAL OINTMENT: 15.7; .44; 24; 20.6 OINTMENT TOPICAL at 04:40

## 2020-05-19 RX ADMIN — Medication 10 ML: at 08:10

## 2020-05-19 RX ADMIN — SKIN PROTECTANT: 44 OINTMENT TOPICAL at 04:40

## 2020-05-19 RX ADMIN — ENOXAPARIN SODIUM 40 MG: 40 INJECTION SUBCUTANEOUS at 08:09

## 2020-05-19 RX ADMIN — TRAZODONE HYDROCHLORIDE 50 MG: 50 TABLET ORAL at 21:10

## 2020-05-19 RX ADMIN — NIFEDIPINE 30 MG: 30 TABLET, EXTENDED RELEASE ORAL at 08:09

## 2020-05-19 RX ADMIN — ASPIRIN 81 MG: 81 TABLET, COATED ORAL at 08:08

## 2020-05-19 ASSESSMENT — PAIN SCALES - GENERAL
PAINLEVEL_OUTOF10: 0

## 2020-05-19 ASSESSMENT — PAIN DESCRIPTION - PROGRESSION
CLINICAL_PROGRESSION: NOT CHANGED

## 2020-05-19 NOTE — PROGRESS NOTES
Speech Language Pathology  Dysphagia Treatment Note    Name:  Christiana Zheng  :   1950  Medical Diagnosis:  Acute alteration in mental status [R41.82]  Acute alteration in mental status [R41.82]  Treatment Diagnosis: Oropharyngeal Dysphagia  Pain level: Pt denies pain at this time    Pt seen bedside for dysphagia treatment follow up to clinical swallow evaluation completed 20. This date, Pt continues to demonstrate confusion and disorientation which are reportedly worse than his baseline function. Therefore, cognitive goals will be established to improve orientation to current place and situation. Current Diet Level: Dysphagia III Soft and Bite-Sized with thin liquids, meds with puree     Tolerance of Current Diet Level: No overt symptoms of aspiration reported per nursing. Occasional coughing with thin liquids reported per Pt. Assessment of Texture Tolerance:  -Impressions: Pt is currently alert and verbally responsive and compliant with po trials to assess for tolerance. Pt noted to take large continuous sips via straw when self feeding with intermittent delayed throat clears noted. Improved tolerance of thin liquids noted with single sips via cup. Mild delayed swallow initiation noted with all textures, likely contributing to intermittent throat clears with large volumes of thin liquids. Education regarding impact of mild delayed swallow initiation on continuous sips via straw, explained to the Pt. Pt advised to attempt single sips at a time to reduced aspiration risk. Pt verbalized understanding, but it is unclear if the Pt will retain this information.     Diet and Treatment Recommendations:  Continue current diet level/avoid straws    (Dysphagia Goals addressed, if appropriate)  1.) Pt will tolerate recommended diet without s/s of aspiration (Ongoing 20)    Cognitive Assessment/Treatment Goals:  Reduced orientation with confusion negatively impacts carryover of compensatory techniques for swallow safety. Pt is highly distractible and only intermittently consistently oriented to place and situation. Short term recall of daily events and new information is moderately impaired, and further contributes to confused state. Therefore, improved orientation and techniques to improve short term recall will be incorporated in therapy, in order to improve recall of compensatory strategies, diet texture recommendations, and aspiration precautions. Cognitive Goals:  1)Pt will improve orientation to x4, for improved awareness of surroundings and reduced confusion (Ongoing 5/19/20)  2)Pt will improve short term recall of daily events and newly learned information via graded tasks, to 80%, for improved safety with dysphagia recommendations. (Ongoing 5/19/20)    Plan:  Continued daily Dysphagia treatment with goals per plan of care. Patient/Family Education:Education given to the Pt and nurse, who verbalized understanding    Discharge Recommendations:  Pt will benefit from continued skilled Speech Therapy for Dysphagia services, prior to returning home. Timed Code Treatment: 10 min    Total Treatment Time: 25 min    If patient discharges prior to next session this note will serve as a discharge summary.      Radha Dinh [id number]

## 2020-05-19 NOTE — PROGRESS NOTES
Progress Note - Dr. Umm Mcdaniels - Internal Medicine  PCP: No primary care provider on file. No primary physician on file. None    Hospital Day: 4  Code Status: Full Code  Current Diet: DIET DYSPHAGIA SOFT AND BITE-SIZED;        CC: follow up on medical issues    Subjective:   Jonathon Bassett is a 71 y.o. male. He denies problems    Doing ok    MRI reviewed  Impression:        No evidence of acute intracranial abnormality. Resolving subacute hematoma within the right parietal lobe measuring 1.9 x  1.3 cm previously measuring 2.3 x 1.9 cm on 03/11/2020. No sign of infection  Pt less confused/agitated    Pt/OT rec snf  Working on placement  Pt is medically stable for SNF    He denies chest pain, denies shortness of breath, denies nausea,  denies emesis. 10 system Review of Systems is reviewed with patient, and pertinent positives are listed here: None . Otherwise, Review of systems is negative. I have reviewed the patient's medical and social history in detail and updated the computerized patient record. To recap: He  has a past medical history of Alcoholism ([facility] [address] Arthritis, Cardiac arrest ([facility] [address] Depression, Hyperlipidemia, Hypertension, PAF (paroxysmal atrial fibrillation) ([facility] [address] and tia. Licha Line He  has a past surgical history that includes Cataract removal with implant (Bilateral); hernia repair; Colonoscopy; Endoscopy, colon, diagnostic; eye surgery; fracture surgery; and pacemaker placement. Licha Line He  reports that he has never smoked. He has never used smokeless tobacco. He reports current alcohol use of about 2.0 standard drinks of alcohol per week. He reports that he does not use drugs. .        Active Hospital Problems    Diagnosis Date Noted    Anemia [D64.9] 05/15/2020    Elevated troponin [R79.89] 05/15/2020    Failure to thrive in adult [R62.7] 05/15/2020    Acute alteration in mental status [R41.82] 05/15/2020    Essential hypertension [I10] 03/11/2020    Hypokalemia [E87.6]     HTN (hypertension), benign [I10] 12/05/2019       Current Facility-Administered Medications: potassium chloride (KLOR-CON M) extended release tablet 20 mEq, 20 mEq, Oral, BID WC  dermaphor ointment, , Topical, BID PRN  menthol-zinc oxide (CALMOSEPTINE) 0.44-20.6 % ointment, , Topical, BID PRN  meclizine (ANTIVERT) tablet 25 mg, 25 mg, Oral, Q6H PRN  HYDROcodone-acetaminophen (NORCO) 5-325 MG per tablet 1 tablet, 1 tablet, Oral, Q6H PRN  LORazepam (ATIVAN) injection 1 mg, 1 mg, Intravenous, Q4H PRN  metoprolol succinate (TOPROL XL) extended release tablet 50 mg, 50 mg, Oral, Daily  NIFEdipine (PROCARDIA XL) extended release tablet 30 mg, 30 mg, Oral, Daily  pantoprazole (PROTONIX) tablet 40 mg, 40 mg, Oral, Daily  sertraline (ZOLOFT) tablet 50 mg, 50 mg, Oral, Daily  traZODone (DESYREL) tablet 50 mg, 50 mg, Oral, Nightly  sodium chloride flush 0.9 % injection 10 mL, 10 mL, Intravenous, 2 times per day  sodium chloride flush 0.9 % injection 10 mL, 10 mL, Intravenous, PRN  magnesium hydroxide (MILK OF MAGNESIA) 400 MG/5ML suspension 30 mL, 30 mL, Oral, Daily PRN  promethazine (PHENERGAN) tablet 12.5 mg, 12.5 mg, Oral, Q6H PRN **OR** ondansetron (ZOFRAN) injection 4 mg, 4 mg, Intravenous, Q6H PRN  enoxaparin (LOVENOX) injection 40 mg, 40 mg, Subcutaneous, Daily  aspirin EC tablet 81 mg, 81 mg, Oral, Daily **OR** aspirin suppository 300 mg, 300 mg, Rectal, Daily  labetalol (NORMODYNE;TRANDATE) injection 10 mg, 10 mg, Intravenous, Q10 Min PRN  potassium chloride (KLOR-CON M) extended release tablet 40 mEq, 40 mEq, Oral, PRN **OR** potassium bicarb-citric acid (EFFER-K) effervescent tablet 40 mEq, 40 mEq, Oral, PRN **OR** potassium chloride 10 mEq/100 mL IVPB (Peripheral Line), 10 mEq, Intravenous, PRN  0.9 % sodium chloride bolus, 500 mL, Intravenous, PRN  hydrALAZINE (APRESOLINE) injection 10 mg, 10 mg, Intravenous, Q6H PRN         Objective:  BP (!) 169/83   Pulse 67   Temp 98.6 °F (37 °C) (Oral)   Resp 20   Ht 5' 8\" and/or weight based adjustment of the mA/kV was utilized to reduce the radiation dose to as low as reasonably achievable. COMPARISON: 03/13/2020 HISTORY: ORDERING SYSTEM PROVIDED HISTORY: ams TECHNOLOGIST PROVIDED HISTORY: Reason for exam:->ams Has a \"code stroke\" or \"stroke alert\" been called? ->No Reason for Exam: Fall (Pt to ER from [facility] for mulitple falls, states possibly 30 falls in last few days. EMS found pt on ground with AMS, pt endorses frequent hallucinations and confusion FINDINGS: BRAIN/VENTRICLES: There is no acute intracranial hemorrhage, mass effect or midline shift. No abnormal extra-axial fluid collection. The gray-white differentiation is maintained without evidence of an acute infarct. There is prominence of the ventricles and sulci due to global parenchymal volume loss. There are nonspecific areas of hypoattenuation within the periventricular and subcortical white matter, which likely represent chronic microvascular ischemic change. Small area of gyral encephalomalacia superiorly in the mid right parietal lobe at the site of prior parenchymal hemorrhage. ORBITS: The visualized portion of the orbits demonstrate no acute abnormality. SINUSES: The visualized paranasal sinuses and mastoid air cells demonstrate no acute abnormality. SOFT TISSUES/SKULL: No acute abnormality of the visualized skull or soft tissues. No acute intracranial abnormality. Mild-to-moderate cerebral atrophy appropriate for age. Mild-to-moderate chronic ischemic changes also age-appropriate. Resolution of the small mid right parietal hematoma. Xr Chest Portable    Result Date: 5/15/2020  EXAMINATION: ONE XRAY VIEW OF THE CHEST 5/15/2020 4:45 pm COMPARISON: March 15, 2020. HISTORY: ORDERING SYSTEM PROVIDED HISTORY: SOB TECHNOLOGIST PROVIDED HISTORY: Reason for exam:->SOB Reason for Exam: Fall (Pt to ER from [facility] for mulitple falls, states possibly 30 falls in last few days. EMS found pt on ground with AMS, pt endorses frequent hallucinations and confusion) Acuity: Acute Type of Exam: Initial FINDINGS: Cardiac and mediastinal contours are enlarged but unchanged. Left chest wall pacemaker device appears in unchanged position. No focal consolidation. No significant pleural effusion. No evidence of pneumothorax. No evidence of acute osseous abnormalities. 1. No focal consolidation, pneumothorax, or significant pleural effusion. 2. Persistently enlarged but unchanged cardiomediastinal silhouette. Assessment and Plan:  Principal Problem:    Acute alteration in mental status -Established problem. Improving   Plan: MRI without acute finding; sx resolving  Active Problems:    HTN (hypertension), benign -Established problem. Uncontrolled 169/83  Plan: cont home meds. Iv hydralazine ordered. Elevated troponin -Established problem. Stable. Plan: no further workup    Failure to thrive in adult  Plan:  pt/ot eval rec SNF    Anemia -Established problem. Stable. Plan: No indication for transfusion. Cont to monitor h/h to assess progression of anemia. Recommend ferrous sulfate or MVI as outpatient.        Disp - will need SNF, d/c order placed 5/19    Bebe Salguero  5/19/2020

## 2020-05-19 NOTE — PROGRESS NOTES
understanding-will require reinforcement  Barriers to Learning: cognition  REQUIRES PT FOLLOW UP: Yes  Activity Tolerance  Activity Tolerance: Patient limited by cognitive status     Patient Diagnosis(es): The primary encounter diagnosis was Frequent falls. Diagnoses of Altered mental status, unspecified altered mental status type, General weakness, and Dehydration were also pertinent to this visit. has a past medical history of Alcoholism ([facility] [address] Arthritis, Cardiac arrest ([facility] [address] Depression, Hyperlipidemia, Hypertension, PAF (paroxysmal atrial fibrillation) ([facility] [address] and tia. has a past surgical history that includes Cataract removal with implant (Bilateral); hernia repair; Colonoscopy; Endoscopy, colon, diagnostic; eye surgery; fracture surgery; and pacemaker placement. Restrictions  Restrictions/Precautions  Restrictions/Precautions: Fall Risk(high fall risk)  Position Activity Restriction  Other position/activity restrictions: Karlee Zepeda is a 71 y.o. male who presents for evaluation of confusion and frequent falls. Patient states that ever since he got a pacemaker he has been confused. He states that he intermittently has hallucinations and states that he has fallen 727 times before in the past month but states that he thinks that it is June. He denies any injury from this. He has bruising and scabbed abrasions throughout with no new injury. No bony tenderness step-offs crepitus deformity or dislocation. He is ambulatory. He states that he does not know who called 911 or how he got here today. In his chart he does have history of alcoholism as well. He denies any recent drinking or illicit drug use. PROPER PPE DONNED PRIOR TO ENTERING PATIENT'S ROOM (FACEMASK, GLOVES, AND FACESHIELD)     Subjective   General  Chart Reviewed: Yes  Response To Previous Treatment: Patient with no complaints from previous session.   Family / Caregiver Present: No  Subjective  Subjective: Pt agreeable to noted  Distance: 200'  Comments: Pt required slightly increased time to perform. No LOB however CGA provided due to increased sway and unsteadiness with ambulation. Pt refusing use of RW despite education and VC. Stairs/Curb  Stairs?: No     Balance  Posture: Good  Sitting - Static: Good;-(SBA seated at EOB ~10-15 minutes total)  Sitting - Dynamic: Good;-(CGA seated at EOB while attempting to don pants)  Standing - Static: Fair  Standing - Dynamic: Fair      ADDENDUM  PT/OT re-entering room as pt's bed alarm going off. Pt found ambulating at end of bed. Pt stating \"I was just getting up for a stretch. \" Pt educated on safety precautions that were reviewed at end of therapy session. Pt then reaching out for bed with B knees flexed and stating \"[place]. I'm feeling weak. \" Min A provided to assist pt to ambulate back to bed and sit EOB. Pt then returning to supine with SBA. RN notified and entering room. Attempted to take BP however unable to register due to UE movement from pt. Time spent re-iterating use of call light and safety with mobility in the hospital. Pt left supine in bed with all needs in reach and alarm engaged.  RN left in room with pt.   [id number]    G-Code     OutComes Score       AM-PAC Score  AM-PAC Inpatient Mobility Raw Score : 17 (05/19/20 1542)  AM-PAC Inpatient T-Scale Score : 42.13 (05/19/20 1542)  Mobility Inpatient CMS 0-100% Score: 50.57 (05/19/20 1542)  Mobility Inpatient CMS G-Code Modifier : CK (05/19/20 1542)          Goals  Short term goals  Time Frame for Short term goals: upon d/c  Short term goal 1: Pt will perform rolling with min Ax1--MET 5/19/2020  Short term goal 2: Pt will perform supine>sit with min Ax1--MET 5/19/2020  Short term goal 3: Pt will perform transfers with LRAD and mod Ax1--MET 5/19/2020  Short term goal 4: Pt will ambulate 10' with LRAD and mod Ax1--MET 5/19/2020  Short term goal 5: Pt will perform bed mobility with supervision  Short term goal 6: Pt will perform

## 2020-05-19 NOTE — PROGRESS NOTES
Learning: cognition  REQUIRES OT FOLLOW UP: Yes  Activity Tolerance  Activity Tolerance: Treatment limited secondary to decreased cognition  Activity Tolerance: no SOB or fatigue reported during session  Safety Devices  Safety Devices in place: Yes  Type of devices: Left in bed;Bed alarm in place;Call light within reach;Nurse notified; All fall risk precautions in place         Patient Diagnosis(es): The primary encounter diagnosis was Frequent falls. Diagnoses of Altered mental status, unspecified altered mental status type, General weakness, and Dehydration were also pertinent to this visit. has a past medical history of Alcoholism ([facility] [address] Arthritis, Cardiac arrest ([facility] [address] Depression, Hyperlipidemia, Hypertension, PAF (paroxysmal atrial fibrillation) ([facility] [address] and tia. has a past surgical history that includes Cataract removal with implant (Bilateral); hernia repair; Colonoscopy; Endoscopy, colon, diagnostic; eye surgery; fracture surgery; and pacemaker placement. Restrictions  Restrictions/Precautions  Restrictions/Precautions: Fall Risk(high fall risk)  Position Activity Restriction  Other position/activity restrictions: Elvia Arshad is a 71 y.o. male who presents for evaluation of confusion and frequent falls. Patient states that ever since he got a pacemaker he has been confused. He states that he intermittently has hallucinations and states that he has fallen 727 times before in the past month but states that he thinks that it is June. He denies any injury from this. He has bruising and scabbed abrasions throughout with no new injury. No bony tenderness step-offs crepitus deformity or dislocation. He is ambulatory. He states that he does not know who called 911 or how he got here today. In his chart he does have history of alcoholism as well. He denies any recent drinking or illicit drug use.     Subjective   General  Chart Reviewed: Yes  Patient assessed for rehabilitation services?: identify errors made;Assistance required to generate solutions;Assistance required to implement solutions;Assistance required to correct errors made  Insights: Not aware of deficits  Initiation: Requires cues for some  Sequencing: Requires cues for some  Cognition Comment: extensive time required for re-directing pt to task/topic, pt easily overwhelmed w/ hands on assistance/tactile input especially if unexpected (such as therapist holding onto gait belt/assisting with pants quickly or providing steadying assist when pt stood w/o gait belt on)                  Addendum: additional 8min in room w/ Pt.   after OT/PT left pt's room, pt's bed alarm went off, OT/PT checked on pt who was found up stance at end of bed stating \"I just wanted to stretch after therapy\"- pt did not recognize OT/PT just in room, pt suddenly bent forward w/ B UE on bed for support in standing, pt assisted to sitting EOB w/ Isak for steadying support/assist provided at hips (pt continued to walk hands along EOB during assist from standing to EOB sitting). Sit>supine w/ SBA, pt declined assist to position in bed despite pt laying sideways and low in bed. RN called in. Attempted to take BP however pt was unable to keep arm still and visually appeared to be in no distress and stating \"I wasn't dizzy, I know what I did was wrong\". OT/PT educated pt on pt safety, again pt verbalized understanding but demonstrating poor carryover. Pt left w/ RN in room, bed alarm on, call light within reach.                      Plan   Plan  Times per week: 3-5x/wk   Times per day: Daily  Current Treatment Recommendations: Endurance Training, Patient/Caregiver Education & Training, Self-Care / ADL, Cognitive Reorientation, Cognitive/Perceptual Training, Balance Training, Functional Mobility Training, Safety Education & Training, Equipment Evaluation, Education, & procurement    AM-PAC Score        AM-PAC Inpatient Daily Activity Raw Score: 16 (05/19/20 2050)  AM-PAC

## 2020-05-20 VITALS
WEIGHT: 170.64 LBS | HEART RATE: 77 BPM | DIASTOLIC BLOOD PRESSURE: 80 MMHG | TEMPERATURE: 97.7 F | BODY MASS INDEX: 25.86 KG/M2 | RESPIRATION RATE: 16 BRPM | HEIGHT: 68 IN | OXYGEN SATURATION: 97 % | SYSTOLIC BLOOD PRESSURE: 160 MMHG

## 2020-05-20 LAB
ANION GAP SERPL CALCULATED.3IONS-SCNC: 10 MMOL/L (ref 3–16)
ANISOCYTOSIS: ABNORMAL
BANDED NEUTROPHILS RELATIVE PERCENT: 1 % (ref 0–7)
BASOPHILS ABSOLUTE: 0 K/UL (ref 0–0.2)
BASOPHILS RELATIVE PERCENT: 0 %
BUN BLDV-MCNC: 11 MG/DL (ref 7–20)
CALCIUM SERPL-MCNC: 8.7 MG/DL (ref 8.3–10.6)
CHLORIDE BLD-SCNC: 105 MMOL/L (ref 99–110)
CO2: 25 MMOL/L (ref 21–32)
CREAT SERPL-MCNC: 0.9 MG/DL (ref 0.8–1.3)
EOSINOPHILS ABSOLUTE: 0.1 K/UL (ref 0–0.6)
EOSINOPHILS RELATIVE PERCENT: 2 %
GFR AFRICAN AMERICAN: >60
GFR NON-AFRICAN AMERICAN: >60
GLUCOSE BLD-MCNC: 104 MG/DL (ref 70–99)
HCT VFR BLD CALC: 35 % (ref 40.5–52.5)
HEMATOLOGY PATH CONSULT: NORMAL
HEMATOLOGY PATH CONSULT: YES
HEMOGLOBIN: 11.7 G/DL (ref 13.5–17.5)
LYMPHOCYTES ABSOLUTE: 0.5 K/UL (ref 1–5.1)
LYMPHOCYTES RELATIVE PERCENT: 10 %
MACROCYTES: ABNORMAL
MCH RBC QN AUTO: 33.4 PG (ref 26–34)
MCHC RBC AUTO-ENTMCNC: 33.4 G/DL (ref 31–36)
MCV RBC AUTO: 99.9 FL (ref 80–100)
MONOCYTES ABSOLUTE: 1.5 K/UL (ref 0–1.3)
MONOCYTES RELATIVE PERCENT: 28 %
NEUTROPHILS ABSOLUTE: 3.2 K/UL (ref 1.7–7.7)
NEUTROPHILS RELATIVE PERCENT: 59 %
PDW BLD-RTO: 19.1 % (ref 12.4–15.4)
PLATELET # BLD: 279 K/UL (ref 135–450)
PMV BLD AUTO: 8 FL (ref 5–10.5)
POTASSIUM SERPL-SCNC: 3.5 MMOL/L (ref 3.5–5.1)
RBC # BLD: 3.5 M/UL (ref 4.2–5.9)
REPORT: NORMAL
SARS-COV-2: NOT DETECTED
SODIUM BLD-SCNC: 140 MMOL/L (ref 136–145)
THIS TEST SENT TO: NORMAL
WBC # BLD: 5.4 K/UL (ref 4–11)

## 2020-05-20 PROCEDURE — 6370000000 HC RX 637 (ALT 250 FOR IP): Performed by: INTERNAL MEDICINE

## 2020-05-20 PROCEDURE — 97535 SELF CARE MNGMENT TRAINING: CPT

## 2020-05-20 PROCEDURE — 94760 N-INVAS EAR/PLS OXIMETRY 1: CPT

## 2020-05-20 PROCEDURE — 85025 COMPLETE CBC W/AUTO DIFF WBC: CPT

## 2020-05-20 PROCEDURE — 80048 BASIC METABOLIC PNL TOTAL CA: CPT

## 2020-05-20 PROCEDURE — 2580000003 HC RX 258: Performed by: INTERNAL MEDICINE

## 2020-05-20 PROCEDURE — 6360000002 HC RX W HCPCS: Performed by: INTERNAL MEDICINE

## 2020-05-20 PROCEDURE — 97112 NEUROMUSCULAR REEDUCATION: CPT

## 2020-05-20 PROCEDURE — 97116 GAIT TRAINING THERAPY: CPT

## 2020-05-20 PROCEDURE — 97530 THERAPEUTIC ACTIVITIES: CPT

## 2020-05-20 PROCEDURE — 36415 COLL VENOUS BLD VENIPUNCTURE: CPT

## 2020-05-20 RX ADMIN — POTASSIUM CHLORIDE 20 MEQ: 1500 TABLET, EXTENDED RELEASE ORAL at 08:28

## 2020-05-20 RX ADMIN — SKIN PROTECTANT: 44 OINTMENT TOPICAL at 13:03

## 2020-05-20 RX ADMIN — HYDROCODONE BITARTRATE AND ACETAMINOPHEN 1 TABLET: 5; 325 TABLET ORAL at 08:29

## 2020-05-20 RX ADMIN — METOPROLOL SUCCINATE 50 MG: 50 TABLET, EXTENDED RELEASE ORAL at 08:29

## 2020-05-20 RX ADMIN — SERTRALINE 50 MG: 50 TABLET, FILM COATED ORAL at 08:28

## 2020-05-20 RX ADMIN — PANTOPRAZOLE SODIUM 40 MG: 40 TABLET, DELAYED RELEASE ORAL at 08:28

## 2020-05-20 RX ADMIN — POTASSIUM CHLORIDE 20 MEQ: 1500 TABLET, EXTENDED RELEASE ORAL at 17:03

## 2020-05-20 RX ADMIN — NIFEDIPINE 30 MG: 30 TABLET, EXTENDED RELEASE ORAL at 08:29

## 2020-05-20 RX ADMIN — MECLIZINE 25 MG: 12.5 TABLET ORAL at 01:22

## 2020-05-20 RX ADMIN — ASPIRIN 81 MG: 81 TABLET, COATED ORAL at 08:28

## 2020-05-20 RX ADMIN — ENOXAPARIN SODIUM 40 MG: 40 INJECTION SUBCUTANEOUS at 08:30

## 2020-05-20 RX ADMIN — HYDRALAZINE HYDROCHLORIDE 10 MG: 20 INJECTION INTRAMUSCULAR; INTRAVENOUS at 06:22

## 2020-05-20 RX ADMIN — Medication 10 ML: at 08:30

## 2020-05-20 ASSESSMENT — PAIN DESCRIPTION - PROGRESSION
CLINICAL_PROGRESSION: NOT CHANGED

## 2020-05-20 ASSESSMENT — PAIN DESCRIPTION - PAIN TYPE
TYPE: ACUTE PAIN
TYPE: ACUTE PAIN

## 2020-05-20 ASSESSMENT — PAIN DESCRIPTION - LOCATION
LOCATION: FOOT
LOCATION: ABDOMEN

## 2020-05-20 ASSESSMENT — PAIN SCALES - GENERAL
PAINLEVEL_OUTOF10: 5
PAINLEVEL_OUTOF10: 8
PAINLEVEL_OUTOF10: 8
PAINLEVEL_OUTOF10: 0

## 2020-05-20 ASSESSMENT — PAIN DESCRIPTION - FREQUENCY: FREQUENCY: CONTINUOUS

## 2020-05-20 ASSESSMENT — PAIN - FUNCTIONAL ASSESSMENT: PAIN_FUNCTIONAL_ASSESSMENT: ACTIVITIES ARE NOT PREVENTED

## 2020-05-20 ASSESSMENT — PAIN DESCRIPTION - DESCRIPTORS: DESCRIPTORS: THROBBING

## 2020-05-20 ASSESSMENT — PAIN DESCRIPTION - ORIENTATION
ORIENTATION: RIGHT;LEFT;LOWER
ORIENTATION: LOWER;RIGHT

## 2020-05-20 NOTE — PROGRESS NOTES
Plan  Times per week: 3-5x  Times per day: Daily  Current Treatment Recommendations: Strengthening, Functional Mobility Training, Balance Training, Transfer Training, Endurance Training, Gait Training, Neuromuscular Re-education, Patient/Caregiver Education & Training, Safety Education & Training, Equipment Evaluation, Education, & procurement, Positioning  Safety Devices  Type of devices:  All fall risk precautions in place, Call light within reach, Patient at risk for falls, Left in bed, Telesitter in use, Nurse notified, Bed alarm in place, Gait belt  Restraints  Initially in place: No     Therapy Time   Individual Concurrent Group Co-treatment   Time In       1114   Time Out       1146   Minutes       32   Timed Code Treatment Minutes: [address] [facility], [address] [facility], [id number]

## 2020-05-20 NOTE — PLAN OF CARE
Problem: Falls - Risk of:  Goal: Will remain free from falls  Description: Will remain free from falls  Outcome: Ongoing  Note: Patient is a high fall risk. Patient free of falls this shift. Bed low, locked and alarmed at all times. Call light and bedside table is within reach. Yellow blanket on bed, arm band on wrist and fall sign posted in the room. Notified patient to ask for assistance when needed. Patient verbalized understanding.           Problem: Cardiovascular  Goal: Hemodynamic stability  Outcome: Ongoing     Problem: Neurological  Goal: Maximum potential motor/sensory/cognitive function  Outcome: Ongoing

## 2020-05-20 NOTE — PROGRESS NOTES
Cognition  Overall Cognitive Status: Exceptions  Arousal/Alertness: Delayed responses to stimuli;Inconsistent responses to stimuli  Following Commands: Inconsistently follows commands  Attention Span: Difficulty attending to directions; Difficulty dividing attention  Memory: Decreased short term memory;Decreased long term memory  Safety Judgement: Decreased awareness of need for assistance;Decreased awareness of need for safety  Problem Solving: Decreased awareness of errors;Assistance required to identify errors made;Assistance required to generate solutions;Assistance required to implement solutions;Assistance required to correct errors made  Insights: Not aware of deficits  Initiation: Requires cues for some  Cognition Comment: extensive time required for re-directing pt to task/topic, pt easily overwhelmed w/ hands on assistance/tactile input especially if unexpected (such as therapist holding onto gait belt or providing steadying assist during functional mobility     Perception  Overall Perceptual Status: [facility]         [email or web address]  Chair Push-ups: x15-20 sit to stands w/o arms from EOB-no LOB         Plan   Plan  Times per week: 3-5x/wk   Times per day: Daily  Current Treatment Recommendations: Endurance Training, Patient/Caregiver Education & Training, Self-Care / ADL, Cognitive Reorientation, Cognitive/Perceptual Training, Balance Training, Functional Mobility Training, Safety Education & Training, Equipment Evaluation, Education, & procurement      AM-PAC Score        AM-PAC Inpatient Daily Activity Raw Score: 17 (05/20/20 1220)  AM-PAC Inpatient ADL T-Scale Score : 37.26 (05/20/20 1220)  ADL Inpatient CMS 0-100% Score: 50.11 (05/20/20 1220)  ADL Inpatient CMS G-Code Modifier : CK (05/20/20 1220)    Goals  Short term goals  Time Frame for Short term goals: by discharge   Short term goal 1: Complete LB ADLs with min A-- Isak for LB dressing, ongoing 5/20  Short term goal 2: Complete toileting with min A-- CGA goal met 5/20  Short term goal 3: Complete functional transfers with min A-- continue for consistency 5/20  Short term goal 4: Complete groomings task with set-up A-- ongoing 5/20       Therapy Time   Individual Concurrent Group Co-treatment   Time In       1114   Time Out       1146   Minutes       32      Timed Code Treatment Minutes:  32 Minutes  Total Treatment Minutes:  [id number] [facility], [address] [place] [address]

## 2020-05-20 NOTE — PROGRESS NOTES
thrive in adult [R62.7] 05/15/2020    Acute alteration in mental status [R41.82] 05/15/2020    Essential hypertension [I10] 03/11/2020    Hypokalemia [E87.6]     HTN (hypertension), benign [I10] 12/05/2019       Current Facility-Administered Medications: potassium chloride (KLOR-CON M) extended release tablet 20 mEq, 20 mEq, Oral, BID WC  dermaphor ointment, , Topical, BID PRN  menthol-zinc oxide (CALMOSEPTINE) 0.44-20.6 % ointment, , Topical, BID PRN  meclizine (ANTIVERT) tablet 25 mg, 25 mg, Oral, Q6H PRN  HYDROcodone-acetaminophen (NORCO) 5-325 MG per tablet 1 tablet, 1 tablet, Oral, Q6H PRN  LORazepam (ATIVAN) injection 1 mg, 1 mg, Intravenous, Q4H PRN  metoprolol succinate (TOPROL XL) extended release tablet 50 mg, 50 mg, Oral, Daily  NIFEdipine (PROCARDIA XL) extended release tablet 30 mg, 30 mg, Oral, Daily  pantoprazole (PROTONIX) tablet 40 mg, 40 mg, Oral, Daily  sertraline (ZOLOFT) tablet 50 mg, 50 mg, Oral, Daily  traZODone (DESYREL) tablet 50 mg, 50 mg, Oral, Nightly  sodium chloride flush 0.9 % injection 10 mL, 10 mL, Intravenous, 2 times per day  sodium chloride flush 0.9 % injection 10 mL, 10 mL, Intravenous, PRN  magnesium hydroxide (MILK OF MAGNESIA) 400 MG/5ML suspension 30 mL, 30 mL, Oral, Daily PRN  promethazine (PHENERGAN) tablet 12.5 mg, 12.5 mg, Oral, Q6H PRN **OR** ondansetron (ZOFRAN) injection 4 mg, 4 mg, Intravenous, Q6H PRN  enoxaparin (LOVENOX) injection 40 mg, 40 mg, Subcutaneous, Daily  aspirin EC tablet 81 mg, 81 mg, Oral, Daily **OR** aspirin suppository 300 mg, 300 mg, Rectal, Daily  labetalol (NORMODYNE;TRANDATE) injection 10 mg, 10 mg, Intravenous, Q10 Min PRN  potassium chloride (KLOR-CON M) extended release tablet 40 mEq, 40 mEq, Oral, PRN **OR** potassium bicarb-citric acid (EFFER-K) effervescent tablet 40 mEq, 40 mEq, Oral, PRN **OR** potassium chloride 10 mEq/100 mL IVPB (Peripheral Line), 10 mEq, Intravenous, PRN  0.9 % sodium chloride bolus, 500 mL, Intravenous, are Reviewed:  Ct Head Wo Contrast    Result Date: 5/15/2020  EXAMINATION: CT OF THE HEAD WITHOUT CONTRAST  5/15/2020 5:17 pm TECHNIQUE: CT of the head was performed without the administration of intravenous contrast. Dose modulation, iterative reconstruction, and/or weight based adjustment of the mA/kV was utilized to reduce the radiation dose to as low as reasonably achievable. COMPARISON: 03/13/2020 HISTORY: ORDERING SYSTEM PROVIDED HISTORY: ams TECHNOLOGIST PROVIDED HISTORY: Reason for exam:->ams Has a \"code stroke\" or \"stroke alert\" been called? ->No Reason for Exam: Fall ([facility] for mulitple falls, states possibly 30 falls in last few days. EMS found pt on ground with AMS, pt endorses frequent hallucinations and confusion FINDINGS: BRAIN/VENTRICLES: There is no acute intracranial hemorrhage, mass effect or midline shift. No abnormal extra-axial fluid collection. The gray-white differentiation is maintained without evidence of an acute infarct. There is prominence of the ventricles and sulci due to global parenchymal volume loss. There are nonspecific areas of hypoattenuation within the periventricular and subcortical white matter, which likely represent chronic microvascular ischemic change. Small area of gyral encephalomalacia superiorly in the mid right parietal lobe at the site of prior parenchymal hemorrhage. ORBITS: The visualized portion of the orbits demonstrate no acute abnormality. SINUSES: The visualized paranasal sinuses and mastoid air cells demonstrate no acute abnormality. SOFT TISSUES/SKULL: No acute abnormality of the visualized skull or soft tissues. No acute intracranial abnormality. Mild-to-moderate cerebral atrophy appropriate for age. Mild-to-moderate chronic ischemic changes also age-appropriate. Resolution of the small mid right parietal hematoma.      Xr Chest Portable    Result Date: 5/15/2020  EXAMINATION: ONE XRAY VIEW OF THE CHEST 5/15/2020

## 2020-05-20 NOTE — CARE COORDINATION
Discharge Plan:     Patient discharged to:  Discharging to [address]   Report: [phone]  Fax: [phone]  SW/DC Planner faxed, [address]  Narcotic Prescriptions faxed were: no  RN: Harmony Krishnan  will call report   Medical Transport with: [facility]  [phone]   time: 6pm  Family advised of discharge?: yes, VM to sister Mary Langley Submitted?:yes    All discharge needs met per case management.       Bairon Chow MSW, [address]
Patient's ex wife is agreeable for him to go to [facility] pending the Covid results. He's been to that home before. HEMS submitted.   Thank you, Eula Masterson, MSW, [phone]
SNF list provided for [facility] contracted facilities. Referrals to all facilities in area with contract: [facility] and rehab  [id number] [facility]    Awaiting SNF follow up.     Kavitha Mix MSW, [address]
none  Premedicated: N/A    Plan   Plan of Care: Wound 05/15/20 Foot Right;Lateral-Dressing/Treatment: Foam  Wound 05/15/20 Foot Anterior;Right-Dressing/Treatment: Foam  Wound 05/15/20 Wrist Left-Dressing/Treatment: Foam    Specialty Bed Required : No   [] Low Air Loss   [] Pressure Redistribution  [] Fluid Immersion  [] Bariatric  [] Total Pressure Relief  [] Other:     Current Diet: DIET DYSPHAGIA SOFT AND BITE-SIZED;   Dietician consult:  No    Discharge Plan:  Placement for patient upon discharge: skilled nursing    Patient appropriate for Outpatient [address] [place] [address] Road: No    Referrals:  [x]   [] [address] [employer] [facility]  [] Supplies  [] Other    Patient/Caregiver Teaching:  Level of patient/caregiver understanding able to:   [] Indicates understanding       [x] Needs reinforcement  [] Unsuccessful      [] Verbal Understanding  [] Demonstrated understanding       [] No evidence of learning  [] Refused teaching         [] N/A       Electronically signed by Tita Miller, RN, BSN,  WOC   RN on 5/18/2020 at 12:29 PM

## 2020-06-14 PROBLEM — R77.8 ELEVATED TROPONIN: Status: RESOLVED | Noted: 2020-05-15 | Resolved: 2020-06-14

## 2020-10-16 ENCOUNTER — HOSPITAL ENCOUNTER (EMERGENCY)
Age: 70
Discharge: HOME | End: 2020-10-17
Attending: EMERGENCY MEDICINE
Payer: OTHER GOVERNMENT

## 2020-10-16 ENCOUNTER — APPOINTMENT (OUTPATIENT)
Dept: GENERAL RADIOLOGY | Age: 70
End: 2020-10-16
Payer: OTHER GOVERNMENT

## 2020-10-16 ENCOUNTER — APPOINTMENT (OUTPATIENT)
Dept: CT IMAGING | Age: 70
End: 2020-10-16
Payer: OTHER GOVERNMENT

## 2020-10-16 LAB
A/G RATIO: 1.3 (ref 1.1–2.2)
ACETAMINOPHEN LEVEL: <5 UG/ML (ref 10–30)
ALBUMIN SERPL-MCNC: 4.6 G/DL (ref 3.4–5)
ALP BLD-CCNC: 88 U/L (ref 40–129)
ALT SERPL-CCNC: 13 U/L (ref 10–40)
AMMONIA: 30 UMOL/L (ref 16–60)
AMPHETAMINE SCREEN, URINE: NORMAL
ANION GAP SERPL CALCULATED.3IONS-SCNC: 18 MMOL/L (ref 3–16)
APTT: 29.1 SEC (ref 24.2–36.2)
AST SERPL-CCNC: 30 U/L (ref 15–37)
BARBITURATE SCREEN URINE: NORMAL
BASOPHILS ABSOLUTE: 0 K/UL (ref 0–0.2)
BASOPHILS RELATIVE PERCENT: 0.4 %
BENZODIAZEPINE SCREEN, URINE: NORMAL
BILIRUB SERPL-MCNC: 1.4 MG/DL (ref 0–1)
BILIRUBIN URINE: NEGATIVE
BLOOD, URINE: ABNORMAL
BUN BLDV-MCNC: 16 MG/DL (ref 7–20)
CALCIUM SERPL-MCNC: 9.6 MG/DL (ref 8.3–10.6)
CANNABINOID SCREEN URINE: NORMAL
CHLORIDE BLD-SCNC: 101 MMOL/L (ref 99–110)
CLARITY: CLEAR
CO2: 22 MMOL/L (ref 21–32)
COCAINE METABOLITE SCREEN URINE: NORMAL
COLOR: YELLOW
CREAT SERPL-MCNC: 0.9 MG/DL (ref 0.8–1.3)
EOSINOPHILS ABSOLUTE: 0 K/UL (ref 0–0.6)
EOSINOPHILS RELATIVE PERCENT: 0 %
EPITHELIAL CELLS, UA: 1 /HPF (ref 0–5)
ETHANOL: NORMAL MG/DL (ref 0–0.08)
GFR AFRICAN AMERICAN: >60
GFR NON-AFRICAN AMERICAN: >60
GLOBULIN: 3.6 G/DL
GLUCOSE BLD-MCNC: 127 MG/DL (ref 70–99)
GLUCOSE URINE: NEGATIVE MG/DL
HCT VFR BLD CALC: 35 % (ref 40.5–52.5)
HEMOGLOBIN: 11.9 G/DL (ref 13.5–17.5)
HYALINE CASTS: 2 /LPF (ref 0–8)
INR BLD: 1.23 (ref 0.86–1.14)
KETONES, URINE: ABNORMAL MG/DL
LACTIC ACID, SEPSIS: 4 MMOL/L (ref 0.4–1.9)
LEUKOCYTE ESTERASE, URINE: NEGATIVE
LIPASE: 59 U/L (ref 13–60)
LYMPHOCYTES ABSOLUTE: 0.3 K/UL (ref 1–5.1)
LYMPHOCYTES RELATIVE PERCENT: 3.8 %
Lab: NORMAL
MCH RBC QN AUTO: 31.6 PG (ref 26–34)
MCHC RBC AUTO-ENTMCNC: 34.1 G/DL (ref 31–36)
MCV RBC AUTO: 92.8 FL (ref 80–100)
METHADONE SCREEN, URINE: NORMAL
MICROSCOPIC EXAMINATION: YES
MONOCYTES ABSOLUTE: 0.5 K/UL (ref 0–1.3)
MONOCYTES RELATIVE PERCENT: 5.2 %
NEUTROPHILS ABSOLUTE: 7.9 K/UL (ref 1.7–7.7)
NEUTROPHILS RELATIVE PERCENT: 90.6 %
NITRITE, URINE: NEGATIVE
OPIATE SCREEN URINE: NORMAL
OXYCODONE URINE: NORMAL
PDW BLD-RTO: 15.8 % (ref 12.4–15.4)
PH UA: 7.5
PH UA: 7.5 (ref 5–8)
PHENCYCLIDINE SCREEN URINE: NORMAL
PLATELET # BLD: 222 K/UL (ref 135–450)
PMV BLD AUTO: 7.3 FL (ref 5–10.5)
POTASSIUM REFLEX MAGNESIUM: 3.9 MMOL/L (ref 3.5–5.1)
PRO-BNP: 429 PG/ML (ref 0–124)
PROPOXYPHENE SCREEN: NORMAL
PROTEIN UA: 30 MG/DL
PROTHROMBIN TIME: 14.3 SEC (ref 10–13.2)
RBC # BLD: 3.77 M/UL (ref 4.2–5.9)
RBC UA: 15 /HPF (ref 0–4)
SALICYLATE, SERUM: <0.3 MG/DL (ref 15–30)
SODIUM BLD-SCNC: 141 MMOL/L (ref 136–145)
SPECIFIC GRAVITY UA: 1.02 (ref 1–1.03)
TOTAL PROTEIN: 8.2 G/DL (ref 6.4–8.2)
TROPONIN: <0.01 NG/ML
URINE REFLEX TO CULTURE: ABNORMAL
URINE TYPE: ABNORMAL
UROBILINOGEN, URINE: 0.2 E.U./DL
WBC # BLD: 8.7 K/UL (ref 4–11)
WBC UA: 1 /HPF (ref 0–5)

## 2020-10-16 PROCEDURE — 93005 ELECTROCARDIOGRAM TRACING: CPT | Performed by: PHYSICIAN ASSISTANT

## 2020-10-16 PROCEDURE — 83880 ASSAY OF NATRIURETIC PEPTIDE: CPT

## 2020-10-16 PROCEDURE — 81001 URINALYSIS AUTO W/SCOPE: CPT

## 2020-10-16 PROCEDURE — 2580000003 HC RX 258: Performed by: PHYSICIAN ASSISTANT

## 2020-10-16 PROCEDURE — G0480 DRUG TEST DEF 1-7 CLASSES: HCPCS

## 2020-10-16 PROCEDURE — 82140 ASSAY OF AMMONIA: CPT

## 2020-10-16 PROCEDURE — 71045 X-RAY EXAM CHEST 1 VIEW: CPT

## 2020-10-16 PROCEDURE — 80307 DRUG TEST PRSMV CHEM ANLYZR: CPT

## 2020-10-16 PROCEDURE — 83605 ASSAY OF LACTIC ACID: CPT

## 2020-10-16 PROCEDURE — 84484 ASSAY OF TROPONIN QUANT: CPT

## 2020-10-16 PROCEDURE — 85025 COMPLETE CBC W/AUTO DIFF WBC: CPT

## 2020-10-16 PROCEDURE — 85730 THROMBOPLASTIN TIME PARTIAL: CPT

## 2020-10-16 PROCEDURE — 70450 CT HEAD/BRAIN W/O DYE: CPT

## 2020-10-16 PROCEDURE — 85610 PROTHROMBIN TIME: CPT

## 2020-10-16 PROCEDURE — 87040 BLOOD CULTURE FOR BACTERIA: CPT

## 2020-10-16 PROCEDURE — 6360000002 HC RX W HCPCS: Performed by: PHYSICIAN ASSISTANT

## 2020-10-16 PROCEDURE — 99285 EMERGENCY DEPT VISIT HI MDM: CPT

## 2020-10-16 PROCEDURE — 96374 THER/PROPH/DIAG INJ IV PUSH: CPT

## 2020-10-16 PROCEDURE — 83690 ASSAY OF LIPASE: CPT

## 2020-10-16 PROCEDURE — 80053 COMPREHEN METABOLIC PANEL: CPT

## 2020-10-16 RX ORDER — 0.9 % SODIUM CHLORIDE 0.9 %
1000 INTRAVENOUS SOLUTION INTRAVENOUS ONCE
Status: COMPLETED | OUTPATIENT
Start: 2020-10-16 | End: 2020-10-16

## 2020-10-16 RX ORDER — LEVETIRACETAM 10 MG/ML
1000 INJECTION INTRAVASCULAR ONCE
Status: COMPLETED | OUTPATIENT
Start: 2020-10-16 | End: 2020-10-17

## 2020-10-16 RX ORDER — LEVETIRACETAM 500 MG/5ML
1000 INJECTION, SOLUTION, CONCENTRATE INTRAVENOUS ONCE
Status: DISCONTINUED | OUTPATIENT
Start: 2020-10-16 | End: 2020-10-16 | Stop reason: CLARIF

## 2020-10-16 RX ORDER — ONDANSETRON 2 MG/ML
4 INJECTION INTRAMUSCULAR; INTRAVENOUS ONCE
Status: COMPLETED | OUTPATIENT
Start: 2020-10-16 | End: 2020-10-16

## 2020-10-16 RX ADMIN — ONDANSETRON 4 MG: 2 INJECTION INTRAMUSCULAR; INTRAVENOUS at 21:55

## 2020-10-16 RX ADMIN — SODIUM CHLORIDE 1000 ML: 9 INJECTION, SOLUTION INTRAVENOUS at 22:41

## 2020-10-16 ASSESSMENT — ENCOUNTER SYMPTOMS
NAUSEA: 1
SHORTNESS OF BREATH: 0
VOMITING: 1
DIARRHEA: 0
ABDOMINAL PAIN: 0
WHEEZING: 0

## 2020-10-17 VITALS
TEMPERATURE: 99.2 F | HEIGHT: 69 IN | BODY MASS INDEX: 25.77 KG/M2 | SYSTOLIC BLOOD PRESSURE: 145 MMHG | DIASTOLIC BLOOD PRESSURE: 75 MMHG | RESPIRATION RATE: 16 BRPM | OXYGEN SATURATION: 96 % | WEIGHT: 174 LBS | HEART RATE: 93 BPM

## 2020-10-17 LAB
EKG ATRIAL RATE: 89 BPM
EKG DIAGNOSIS: NORMAL
EKG P AXIS: 63 DEGREES
EKG P-R INTERVAL: 154 MS
EKG Q-T INTERVAL: 386 MS
EKG QRS DURATION: 92 MS
EKG QTC CALCULATION (BAZETT): 469 MS
EKG R AXIS: -2 DEGREES
EKG T AXIS: 13 DEGREES
EKG VENTRICULAR RATE: 89 BPM
KEPPRA DOSE AMT: ABNORMAL
KEPPRA: 5.4 UG/ML (ref 6–46)
LACTIC ACID, SEPSIS: 1.2 MMOL/L (ref 0.4–1.9)

## 2020-10-17 PROCEDURE — 96375 TX/PRO/DX INJ NEW DRUG ADDON: CPT

## 2020-10-17 PROCEDURE — 6370000000 HC RX 637 (ALT 250 FOR IP): Performed by: PHYSICIAN ASSISTANT

## 2020-10-17 PROCEDURE — 80177 DRUG SCRN QUAN LEVETIRACETAM: CPT

## 2020-10-17 PROCEDURE — 83605 ASSAY OF LACTIC ACID: CPT

## 2020-10-17 PROCEDURE — 6360000002 HC RX W HCPCS: Performed by: PHYSICIAN ASSISTANT

## 2020-10-17 RX ORDER — ONDANSETRON 4 MG/1
4 TABLET, ORALLY DISINTEGRATING ORAL EVERY 8 HOURS PRN
Qty: 20 TABLET | Refills: 0 | Status: SHIPPED | OUTPATIENT
Start: 2020-10-17

## 2020-10-17 RX ORDER — ACETAMINOPHEN 500 MG
1000 TABLET ORAL ONCE
Status: COMPLETED | OUTPATIENT
Start: 2020-10-17 | End: 2020-10-17

## 2020-10-17 RX ADMIN — LEVETIRACETAM 1000 MG: 10 INJECTION INTRAVENOUS at 01:58

## 2020-10-17 RX ADMIN — ACETAMINOPHEN 1000 MG: 500 TABLET, FILM COATED ORAL at 02:07

## 2020-10-17 ASSESSMENT — PAIN SCALES - GENERAL: PAINLEVEL_OUTOF10: 8

## 2020-10-17 NOTE — ED PROVIDER NOTES
[address] [facility]        Pt Name: Krish Alarcon  MRN: [id number]  [place] 1950  Date of evaluation: 10/16/2020  Provider: Sugey Cronin  PCP: No primary care provider on file. I have seen and evaluated this patient with my supervising physician Katelynn Dawson MD.    [address] [facility]       Chief Complaint   Patient presents with    Altered Mental Status     pt brought in by [facility] from [address] [place] [address] where pt went to  with n/v and \"I was not acting normal\"       HISTORY OF PRESENT ILLNESS   (Location, Timing/Onset, Context/Setting, Quality, Duration, Modifying Factors, Severity, Associated Signs and Symptoms)  Note limiting factors. Krish Alarcon is a 71 y.o. male patient presents emergency department for evaluation of \"not acting normal\". Patient lives at an [facility] hotel and went to the  complaining of nausea and vomiting. Patient has a history of NSTEMI, atrial fibrillation, encephalopathy, alcoholism and seizures. Patient states he currently takes 1000 mg of Keppra daily. Patient states he has been taking his medications as prescribed. Patient states he had a pacemaker placed in March. Patient states he started with nausea, vomiting, chills and headache last night. .  Patient denies any neck or back pain. Patient states he last drank alcohol within the last week. He is not sure exactly which day. He states he only drank 2 bottles of beer. He states he has not been drinking heavily recently. Patient denies any abdominal pain, cough, diarrhea. He denies any drug use. Nursing Notes were all reviewed and agreed with or any disagreements were addressed in the HPI. REVIEW OF SYSTEMS    (2-9 systems for level 4, 10 or more for level 5)     Review of Systems   Constitutional: Positive for chills. Negative for fatigue and fever. HENT: Negative.     Eyes: Negative for visual disturbance. Respiratory: Negative for shortness of breath and wheezing. Cardiovascular: Negative for chest pain and palpitations. Gastrointestinal: Positive for nausea and vomiting. Negative for abdominal pain and diarrhea. Genitourinary: Negative. Musculoskeletal: Positive for myalgias. Skin: Negative. Neurological: Positive for headaches. Positives and Pertinent negatives as per HPI. Except as noted above in the ROS, all other systems were reviewed and negative.        PAST MEDICAL HISTORY     Past Medical History:   Diagnosis Date    Alcoholism [facility])     Arthritis     Cardiac arrest [facility] 08/2016    Depression     Hyperlipidemia     Hypertension     PAF (paroxysmal atrial fibrillation) ([facility]     tia     4 mini-strokes per pt report         SURGICAL HISTORY     Past Surgical History:   Procedure Laterality Date    CATARACT REMOVAL WITH IMPLANT Bilateral     COLONOSCOPY      ENDOSCOPY, COLON, DIAGNOSTIC      EYE SURGERY      FRACTURE SURGERY      tibia & fibula Fx (pt reports he was in highschool at the time)    HERNIA REPAIR      umbilical hernia repair    PACEMAKER PLACEMENT      PPM palpated in left upper chest wall; pt reports device as PPM, not ICD         CURRENTMEDICATIONS       Discharge Medication List as of 10/17/2020  2:57 AM      CONTINUE these medications which have NOT CHANGED    Details   levETIRAcetam (KEPPRA) 1000 MG tablet Take 1 tablet by mouth 2 times daily, Disp-60 tablet,R-3Normal      metoprolol succinate (TOPROL XL) 50 MG extended release tablet Take 50 mg by mouth dailyHistorical Med      NIFEdipine (PROCARDIA XL) 30 MG extended release tablet Take 30 mg by mouth dailyHistorical Med      pantoprazole (PROTONIX) 40 MG tablet Take 40 mg by mouth dailyHistorical Med      sertraline (ZOLOFT) 50 MG tablet Take 50 mg by mouth dailyHistorical Med      traZODone (DESYREL) 50 MG tablet Take 50 mg by mouth nightlyHistorical Med               ALLERGIES components within normal limits    Narrative:     Performed at:  [facility]  [address],  [place], [address]   Phone [phone]   CULTURE, BLOOD 1   CULTURE, BLOOD 2   LIPASE    Narrative:     Performed at:  [facility]  [address],  [place], [address]   Phone [phone]   AMMONIA    Narrative:     Performed at:  [facility] [address],  [place], [address]   Phone [phone]   TROPONIN    Narrative:     Performed at:  [facility] [address],  [place], [address]   Phone [phone]   APTT    Narrative:     Performed at:  [facility]  [address],  [place], [address]   Phone [phone]   URINE DRUG SCREEN    Narrative:     Performed at:  [facility] [address],  [place], [address]   Phone [phone]   ETHANOL    Narrative:     Performed at:  [facility] [address] [place],  [place], [address]   Phone [phone]   LACTATE, SEPSIS    Narrative:     Performed at:  [facility] [address],  [place], [address]   Phone [phone]   LEVETIRACETAM LEVEL    Narrative:     Performed at:  [facility]  [address] [place], [place] [address]   Phone [phone]       All other labs were within normal range or not returned as of this dictation. EKG: All EKG's are interpreted by the Emergency Department Physician in the absence of a cardiologist.  Please see their note for interpretation of EKG.       RADIOLOGY:   Non-plain film images such as CT, Ultrasound and MRI are read by the radiologist. Plain radiographic images are visualized and preliminarily interpreted by the ED Provider with the below findings:        Interpretation per the Radiologist below, if available at the time of this note:    XR CHEST PORTABLE   Final Result   No acute cardiopulmonary findings. Shallow inspiratory effort. CT Head WO Contrast   Final Result   No acute intracranial abnormality. No results found. PROCEDURES   Unless otherwise noted below, none     Procedures    CRITICAL CARE TIME   N/A    CONSULTS:  IP CONSULT TO HOSPITALIST      EMERGENCY DEPARTMENT COURSE and DIFFERENTIAL DIAGNOSIS/MDM:   Vitals:    Vitals:    10/17/20 0000 10/17/20 0020 10/17/20 0115 10/17/20 0158   BP: (!) 141/64 (!) 147/74 (!) 147/74 (!) 145/75   Pulse: 91 91 91 93   Resp:  16  16   Temp:       TempSrc:       SpO2:  99%  96%   Weight:       Height:           Patient was given the following medications:  Medications   ondansetron (ZOFRAN) injection 4 mg (4 mg Intravenous Given 10/16/20 2155)   0.9 % sodium chloride bolus (0 mLs Intravenous Stopped 10/16/20 2341)   levetiracetam (KEPPRA) 1000 mg/100 mL IVPB (0 mg Intravenous Stopped 10/17/20 0213)   acetaminophen (TYLENOL) tablet 1,000 mg (1,000 mg Oral Given 10/17/20 0207)         Patient is extremely sensitive to touch, he jumps to the touch with the thermometer and stethoscope. He is tremulous but states he feels cold. Lungs are clear to auscultation bilaterally abdomen is soft and nontender without rebound or guarding. Patient has scant swelling to bilateral lower extremities. Patient denies any hallucinations. Patient's finger-to-nose test is uncoordinated. No pronator drift. No weakness to plantar flexion or dorsiflexion. Able to hold legs against gravity bilaterally. No focal deficits noted. Cranial nerves II through XII are grossly intact. No slurred speech. Patient is alert and oriented x4. Heart rate is regular without murmurs rubs or gallops. Lungs sound clear to auscultation bilaterally. Abdomen is soft and nontender without rebound or guarding. Bowel sounds normal bilaterally. Patient states that he still feels nauseated. He is given Zofran. Patient will be started on 1 L of fluids. Lactic acid is 4.0. Laboratory evaluation largely unremarkable otherwise. No source of infection found on urinalysis or chest x-ray. CT of his head shows no acute intracranial bleed. Patient will be given a 1 g dose of Keppra as this is his nighttime dose. He is tremulous generally but does not appear to be seizure-like activity. EOMs are intact. No nystagmus noted. He does not have any room spinning dizziness. Patient is given a total of 2 L of fluids here in the emergency department. On reevaluation he is significantly improved. He is no longer shivering. He states he feels much better. I suspect that this was an abnormal postictal state. Patient states he has not been sleeping well. He was given Keppra here in the emergency department and does not have any seizure-like activity while here. Patient's repeat lactic acid has decreased. Patient will be discharged home to follow-up with his primary care doctor regarding his increased frequency of seizures. Patient expresses understanding will be discharged home. At this time I have low suspicion for status epilepticus, TIA, stroke, intracranial bleed, infection or other acute process notable for further management. SEP-1 CORE MEASURE DATA    Classification: exclude from core measure    Amount of fluids ordered: less than 30mL/kg because do not feel this is an infectious process      Exclusion criteria: the patient is NOT to be included for sepsis due to: Infection is not suspected      FINAL IMPRESSION      1.  Post-ictal state [facility])          DISPOSITION/PLAN   DISPOSITION        PATIENT REFERREDTO:  [facility]) Pre-Services  [phone]  Schedule an appointment as soon as possible for a visit   As needed    [facility] Emergency Department  [address] [place] [address]  [phone]    If symptoms worsen      DISCHARGE MEDICATIONS:  Discharge Medication List as of 10/17/2020  2:57 AM      START taking these medications    Details   ondansetron (ZOFRAN ODT) 4 MG disintegrating tablet Take 1 tablet by mouth every 8 hours as needed for Nausea, Disp-20 tablet,R-0Print             DISCONTINUED MEDICATIONS:  Discharge Medication List as of 10/17/2020  2:57 AM      STOP taking these medications       metroNIDAZOLE (METROCREAM) 0.75 % cream Comments:   Reason for Stopping:                      (Please note that portions of this note were completed with a voice recognition program.  Efforts were made to edit the dictations but occasionally words are mis-transcribed.)    Ashley cMguire PA-C (electronically signed)            Ashley Mcguire PA-C  10/17/20 8636

## 2020-10-20 LAB
BLOOD CULTURE, ROUTINE: NORMAL
CULTURE, BLOOD 2: NORMAL

## 2020-10-23 NOTE — ED PROVIDER NOTES
Attending Supervisory Note/Shared Visit   I have personally performed a face to face diagnostic evaluation on this patient. I have reviewed the mid-levels findings and agree. History and Exam by me shows chronically ill-appearing male in no acute distress. Patient is a history of alcoholism with previous traumatic brain injury and subarachnoid hemorrhage. He states that after his head trauma he developed seizure disorder but has been to his medications. He is brought into the ED for altered mental state initially the patient appeared to be very confused. He was unable to describe the events leading up to his presentation to the emergency department. At time evaluation the patient was ANO x3. He denies any recent illness or infectious symptoms. He does report having changes in his sleeping patterns and has not been sleeping well. He states he does have a neurologist that he follows up with. He states that he is not used alcohol in over a year and denies illicit uses of drugs. On physical exam cranial nerves II 12 are grossly intact/he has intact sensation all extremities. He ambulates that difficulties. He is ANO x3. He has a regular rate and rhythm. Abdomen soft nontender distended. Patient's laboratory work-up remarkable for an elevated lactate. He has no leukocytosis I suspect is there this is secondary to metabolic etiology. Imaging shows no acute findings. Drug screen is negative and ethanol is undetectable. Patient reevaluated and does appear to be mentating better. Suspect he may have had a seizure and was postictal on initial presentation due to the elevated lactate history of seizure disorder. He does report having recurrent seizures recently. He was loaded with Keppra. Admission was discussed with the hospitalist who was of the opinion the patient was safe to discharge home. Patient was ambulating without difficulties and that appear to be acutely altered or confused.   He is amenable to discharge home with outpatient follow-up. I agree with the VENU plan of care. Please VENU Note for full ED course and final disposition. EKG shows a sinus rhythm with a ventricular rate of 89 bpm.  VT interval and QTc interval within normal limits. Patient has normal axis. There are no significant ST elevations or depressions EKG is nondiagnostic for ACS. Compared to EKG from 12/12/2020 there are nonspecific ST changes in inferior anterior leads. Disposition:  1.  [facility]          Andrew Cintron MD  Attending Emergency Physician        Andrew Cintron MD  10/23/20 5794

## 2020-11-03 PROBLEM — I10 HTN (HYPERTENSION), BENIGN: Status: RESOLVED | Noted: 2019-12-05 | Resolved: 2020-11-03

## 2020-12-01 ENCOUNTER — HOSPITAL ENCOUNTER (INPATIENT)
Age: 70
LOS: 1 days | Discharge: HOME | DRG: 310 | End: 2020-12-02
Attending: STUDENT IN AN ORGANIZED HEALTH CARE EDUCATION/TRAINING PROGRAM | Admitting: INTERNAL MEDICINE
Payer: MEDICARE

## 2020-12-01 ENCOUNTER — APPOINTMENT (OUTPATIENT)
Dept: GENERAL RADIOLOGY | Age: 70
DRG: 310 | End: 2020-12-01
Payer: MEDICARE

## 2020-12-01 PROBLEM — I48.91 A-FIB (HCC): Status: ACTIVE | Noted: 2020-12-01

## 2020-12-01 LAB
A/G RATIO: 1.3 (ref 1.1–2.2)
ALBUMIN SERPL-MCNC: 4.5 G/DL (ref 3.4–5)
ALP BLD-CCNC: 76 U/L (ref 40–129)
ALT SERPL-CCNC: 18 U/L (ref 10–40)
ANION GAP SERPL CALCULATED.3IONS-SCNC: 23 MMOL/L (ref 3–16)
APTT: 31 SEC (ref 24.2–36.2)
AST SERPL-CCNC: 53 U/L (ref 15–37)
BASOPHILS ABSOLUTE: 0.1 K/UL (ref 0–0.2)
BASOPHILS RELATIVE PERCENT: 1.2 %
BILIRUB SERPL-MCNC: 1.3 MG/DL (ref 0–1)
BUN BLDV-MCNC: 15 MG/DL (ref 7–20)
CALCIUM SERPL-MCNC: 9 MG/DL (ref 8.3–10.6)
CHLORIDE BLD-SCNC: 97 MMOL/L (ref 99–110)
CO2: 19 MMOL/L (ref 21–32)
CREAT SERPL-MCNC: 1.2 MG/DL (ref 0.8–1.3)
EOSINOPHILS ABSOLUTE: 0.1 K/UL (ref 0–0.6)
EOSINOPHILS RELATIVE PERCENT: 1.7 %
ETHANOL: 125 MG/DL (ref 0–0.08)
GFR AFRICAN AMERICAN: >60
GFR NON-AFRICAN AMERICAN: 60
GLOBULIN: 3.4 G/DL
GLUCOSE BLD-MCNC: 92 MG/DL (ref 70–99)
HCT VFR BLD CALC: 36.2 % (ref 40.5–52.5)
HEMOGLOBIN: 11.7 G/DL (ref 13.5–17.5)
INR BLD: 1.23 (ref 0.86–1.14)
LIPASE: 61 U/L (ref 13–60)
LYMPHOCYTES ABSOLUTE: 2 K/UL (ref 1–5.1)
LYMPHOCYTES RELATIVE PERCENT: 32.2 %
MAGNESIUM: 1.4 MG/DL (ref 1.8–2.4)
MCH RBC QN AUTO: 30.3 PG (ref 26–34)
MCHC RBC AUTO-ENTMCNC: 32.3 G/DL (ref 31–36)
MCV RBC AUTO: 93.8 FL (ref 80–100)
MONOCYTES ABSOLUTE: 1.4 K/UL (ref 0–1.3)
MONOCYTES RELATIVE PERCENT: 22.1 %
NEUTROPHILS ABSOLUTE: 2.7 K/UL (ref 1.7–7.7)
NEUTROPHILS RELATIVE PERCENT: 42.8 %
PDW BLD-RTO: 18.7 % (ref 12.4–15.4)
PLATELET # BLD: 177 K/UL (ref 135–450)
PMV BLD AUTO: 7.8 FL (ref 5–10.5)
POTASSIUM SERPL-SCNC: 2.8 MMOL/L (ref 3.5–5.1)
PRO-BNP: 626 PG/ML (ref 0–124)
PROTHROMBIN TIME: 14.3 SEC (ref 10–13.2)
RBC # BLD: 3.85 M/UL (ref 4.2–5.9)
SODIUM BLD-SCNC: 139 MMOL/L (ref 136–145)
TOTAL PROTEIN: 7.9 G/DL (ref 6.4–8.2)
TROPONIN: 0.03 NG/ML
WBC # BLD: 6.3 K/UL (ref 4–11)

## 2020-12-01 PROCEDURE — 71045 X-RAY EXAM CHEST 1 VIEW: CPT

## 2020-12-01 PROCEDURE — 96367 TX/PROPH/DG ADDL SEQ IV INF: CPT

## 2020-12-01 PROCEDURE — 85730 THROMBOPLASTIN TIME PARTIAL: CPT

## 2020-12-01 PROCEDURE — 84484 ASSAY OF TROPONIN QUANT: CPT

## 2020-12-01 PROCEDURE — 2580000003 HC RX 258: Performed by: STUDENT IN AN ORGANIZED HEALTH CARE EDUCATION/TRAINING PROGRAM

## 2020-12-01 PROCEDURE — 96375 TX/PRO/DX INJ NEW DRUG ADDON: CPT

## 2020-12-01 PROCEDURE — 83735 ASSAY OF MAGNESIUM: CPT

## 2020-12-01 PROCEDURE — 2580000003 HC RX 258: Performed by: INTERNAL MEDICINE

## 2020-12-01 PROCEDURE — 6360000002 HC RX W HCPCS: Performed by: PHYSICIAN ASSISTANT

## 2020-12-01 PROCEDURE — 83690 ASSAY OF LIPASE: CPT

## 2020-12-01 PROCEDURE — 6360000002 HC RX W HCPCS: Performed by: STUDENT IN AN ORGANIZED HEALTH CARE EDUCATION/TRAINING PROGRAM

## 2020-12-01 PROCEDURE — 93005 ELECTROCARDIOGRAM TRACING: CPT | Performed by: INTERNAL MEDICINE

## 2020-12-01 PROCEDURE — 2580000003 HC RX 258: Performed by: PHYSICIAN ASSISTANT

## 2020-12-01 PROCEDURE — 6370000000 HC RX 637 (ALT 250 FOR IP): Performed by: PHYSICIAN ASSISTANT

## 2020-12-01 PROCEDURE — 92960 CARDIOVERSION ELECTRIC EXT: CPT

## 2020-12-01 PROCEDURE — 80053 COMPREHEN METABOLIC PANEL: CPT

## 2020-12-01 PROCEDURE — 85610 PROTHROMBIN TIME: CPT

## 2020-12-01 PROCEDURE — 96361 HYDRATE IV INFUSION ADD-ON: CPT

## 2020-12-01 PROCEDURE — G0480 DRUG TEST DEF 1-7 CLASSES: HCPCS

## 2020-12-01 PROCEDURE — 85025 COMPLETE CBC W/AUTO DIFF WBC: CPT

## 2020-12-01 PROCEDURE — 2100000000 HC CCU R&B

## 2020-12-01 PROCEDURE — 93005 ELECTROCARDIOGRAM TRACING: CPT | Performed by: STUDENT IN AN ORGANIZED HEALTH CARE EDUCATION/TRAINING PROGRAM

## 2020-12-01 PROCEDURE — 2500000003 HC RX 250 WO HCPCS

## 2020-12-01 PROCEDURE — 2500000003 HC RX 250 WO HCPCS: Performed by: PHYSICIAN ASSISTANT

## 2020-12-01 PROCEDURE — 96365 THER/PROPH/DIAG IV INF INIT: CPT

## 2020-12-01 PROCEDURE — 99284 EMERGENCY DEPT VISIT MOD MDM: CPT

## 2020-12-01 PROCEDURE — 83880 ASSAY OF NATRIURETIC PEPTIDE: CPT

## 2020-12-01 RX ORDER — 0.9 % SODIUM CHLORIDE 0.9 %
1000 INTRAVENOUS SOLUTION INTRAVENOUS ONCE
Status: COMPLETED | OUTPATIENT
Start: 2020-12-01 | End: 2020-12-01

## 2020-12-01 RX ORDER — MAGNESIUM SULFATE 1 G/100ML
1 INJECTION INTRAVENOUS ONCE
Status: COMPLETED | OUTPATIENT
Start: 2020-12-01 | End: 2020-12-01

## 2020-12-01 RX ORDER — POTASSIUM CHLORIDE 7.45 MG/ML
10 INJECTION INTRAVENOUS ONCE
Status: COMPLETED | OUTPATIENT
Start: 2020-12-01 | End: 2020-12-01

## 2020-12-01 RX ORDER — MAGNESIUM SULFATE IN WATER 40 MG/ML
2 INJECTION, SOLUTION INTRAVENOUS ONCE
Status: DISCONTINUED | OUTPATIENT
Start: 2020-12-01 | End: 2020-12-02

## 2020-12-01 RX ORDER — ONDANSETRON 2 MG/ML
4 INJECTION INTRAMUSCULAR; INTRAVENOUS ONCE
Status: COMPLETED | OUTPATIENT
Start: 2020-12-01 | End: 2020-12-01

## 2020-12-01 RX ORDER — ONDANSETRON 2 MG/ML
4 INJECTION INTRAMUSCULAR; INTRAVENOUS EVERY 6 HOURS PRN
Status: DISCONTINUED | OUTPATIENT
Start: 2020-12-01 | End: 2020-12-02 | Stop reason: HOSPADM

## 2020-12-01 RX ORDER — ACETAMINOPHEN 650 MG/1
650 SUPPOSITORY RECTAL EVERY 6 HOURS PRN
Status: DISCONTINUED | OUTPATIENT
Start: 2020-12-01 | End: 2020-12-02 | Stop reason: HOSPADM

## 2020-12-01 RX ORDER — DIGOXIN 0.25 MG/ML
500 INJECTION INTRAMUSCULAR; INTRAVENOUS ONCE
Status: COMPLETED | OUTPATIENT
Start: 2020-12-01 | End: 2020-12-01

## 2020-12-01 RX ORDER — ACETAMINOPHEN 325 MG/1
650 TABLET ORAL EVERY 6 HOURS PRN
Status: DISCONTINUED | OUTPATIENT
Start: 2020-12-01 | End: 2020-12-02 | Stop reason: HOSPADM

## 2020-12-01 RX ORDER — SODIUM CHLORIDE 0.9 % (FLUSH) 0.9 %
10 SYRINGE (ML) INJECTION PRN
Status: DISCONTINUED | OUTPATIENT
Start: 2020-12-01 | End: 2020-12-02 | Stop reason: HOSPADM

## 2020-12-01 RX ORDER — DILTIAZEM HYDROCHLORIDE 5 MG/ML
10 INJECTION INTRAVENOUS ONCE
Status: DISCONTINUED | OUTPATIENT
Start: 2020-12-01 | End: 2020-12-01

## 2020-12-01 RX ORDER — POTASSIUM CHLORIDE 7.45 MG/ML
10 INJECTION INTRAVENOUS PRN
Status: DISCONTINUED | OUTPATIENT
Start: 2020-12-01 | End: 2020-12-02 | Stop reason: HOSPADM

## 2020-12-01 RX ORDER — ASPIRIN 81 MG/1
324 TABLET, CHEWABLE ORAL ONCE
Status: COMPLETED | OUTPATIENT
Start: 2020-12-01 | End: 2020-12-01

## 2020-12-01 RX ORDER — DIGOXIN 0.25 MG/ML
250 INJECTION INTRAMUSCULAR; INTRAVENOUS EVERY 8 HOURS
Status: DISPENSED | OUTPATIENT
Start: 2020-12-01 | End: 2020-12-02

## 2020-12-01 RX ORDER — SODIUM CHLORIDE 9 MG/ML
INJECTION, SOLUTION INTRAVENOUS CONTINUOUS
Status: DISCONTINUED | OUTPATIENT
Start: 2020-12-01 | End: 2020-12-02 | Stop reason: HOSPADM

## 2020-12-01 RX ORDER — LEVETIRACETAM 500 MG/1
1000 TABLET ORAL 2 TIMES DAILY
Status: DISCONTINUED | OUTPATIENT
Start: 2020-12-01 | End: 2020-12-02 | Stop reason: HOSPADM

## 2020-12-01 RX ORDER — POLYETHYLENE GLYCOL 3350 17 G/17G
17 POWDER, FOR SOLUTION ORAL DAILY PRN
Status: DISCONTINUED | OUTPATIENT
Start: 2020-12-01 | End: 2020-12-02 | Stop reason: HOSPADM

## 2020-12-01 RX ORDER — SODIUM CHLORIDE 0.9 % (FLUSH) 0.9 %
10 SYRINGE (ML) INJECTION EVERY 12 HOURS SCHEDULED
Status: DISCONTINUED | OUTPATIENT
Start: 2020-12-01 | End: 2020-12-02 | Stop reason: HOSPADM

## 2020-12-01 RX ORDER — POTASSIUM CHLORIDE 20 MEQ/1
40 TABLET, EXTENDED RELEASE ORAL PRN
Status: DISCONTINUED | OUTPATIENT
Start: 2020-12-01 | End: 2020-12-02 | Stop reason: HOSPADM

## 2020-12-01 RX ORDER — TRAZODONE HYDROCHLORIDE 50 MG/1
50 TABLET ORAL NIGHTLY
Status: DISCONTINUED | OUTPATIENT
Start: 2020-12-01 | End: 2020-12-02 | Stop reason: HOSPADM

## 2020-12-01 RX ORDER — ETOMIDATE 2 MG/ML
INJECTION INTRAVENOUS
Status: COMPLETED
Start: 2020-12-01 | End: 2020-12-01

## 2020-12-01 RX ORDER — PROMETHAZINE HYDROCHLORIDE 25 MG/1
12.5 TABLET ORAL EVERY 6 HOURS PRN
Status: DISCONTINUED | OUTPATIENT
Start: 2020-12-01 | End: 2020-12-02 | Stop reason: HOSPADM

## 2020-12-01 RX ADMIN — FAMOTIDINE 20 MG: 10 INJECTION INTRAVENOUS at 16:05

## 2020-12-01 RX ADMIN — ETOMIDATE: 20 INJECTION, SOLUTION INTRAVENOUS at 16:13

## 2020-12-01 RX ADMIN — SODIUM CHLORIDE 1000 ML: 9 INJECTION, SOLUTION INTRAVENOUS at 16:36

## 2020-12-01 RX ADMIN — ONDANSETRON 4 MG: 2 INJECTION INTRAMUSCULAR; INTRAVENOUS at 16:05

## 2020-12-01 RX ADMIN — Medication 10 ML: at 22:54

## 2020-12-01 RX ADMIN — ASPIRIN 324 MG: 81 TABLET, CHEWABLE ORAL at 16:05

## 2020-12-01 RX ADMIN — SODIUM CHLORIDE: 9 INJECTION, SOLUTION INTRAVENOUS at 22:55

## 2020-12-01 RX ADMIN — SODIUM CHLORIDE 1000 ML: 9 INJECTION, SOLUTION INTRAVENOUS at 16:05

## 2020-12-01 RX ADMIN — AMIODARONE HYDROCHLORIDE 1 MG/MIN: 50 INJECTION, SOLUTION INTRAVENOUS at 17:21

## 2020-12-01 RX ADMIN — DIGOXIN 500 MCG: 0.25 INJECTION INTRAMUSCULAR; INTRAVENOUS at 16:44

## 2020-12-01 RX ADMIN — POTASSIUM CHLORIDE 10 MEQ: 7.46 INJECTION, SOLUTION INTRAVENOUS at 17:24

## 2020-12-01 RX ADMIN — MAGNESIUM SULFATE HEPTAHYDRATE 1 G: 1 INJECTION, SOLUTION INTRAVENOUS at 16:50

## 2020-12-01 RX ADMIN — AMIODARONE HYDROCHLORIDE 150 MG: 50 INJECTION, SOLUTION INTRAVENOUS at 16:58

## 2020-12-01 ASSESSMENT — PAIN SCALES - GENERAL
PAINLEVEL_OUTOF10: 4
PAINLEVEL_OUTOF10: 0
PAINLEVEL_OUTOF10: 0
PAINLEVEL_OUTOF10: 6

## 2020-12-01 ASSESSMENT — ENCOUNTER SYMPTOMS
COUGH: 0
SHORTNESS OF BREATH: 0
DIARRHEA: 0
VOMITING: 0
NAUSEA: 0
RHINORRHEA: 0
ABDOMINAL PAIN: 0

## 2020-12-01 ASSESSMENT — PAIN DESCRIPTION - PAIN TYPE: TYPE: ACUTE PAIN

## 2020-12-01 NOTE — ED PROVIDER NOTES
k[place] out of the [place] as the [place]. He is not taking any medications. Nursing Notes were all reviewed and agreed with or any disagreements were addressed in the HPI. REVIEW OF SYSTEMS    (2-9 systems for level 4, 10 or more for level 5)     Review of Systems   Constitutional: Negative for activity change, appetite change, chills and fever. HENT: Negative for congestion and rhinorrhea. Respiratory: Negative for cough and shortness of breath. Cardiovascular: Positive for chest pain and palpitations. Negative for leg swelling. Gastrointestinal: Negative for abdominal pain, diarrhea, nausea and vomiting. Genitourinary: Negative for difficulty urinating, dysuria and hematuria. Neurological: Negative for weakness. Positives and Pertinent negatives as per HPI. Except as noted above in the ROS, all other systems were reviewed and negative.        PAST MEDICAL HISTORY     Past Medical History:   Diagnosis Date    Alcoholism [facility])     Arthritis     Cardiac arrest ([facility] [address] 08/2016    Depression     Hyperlipidemia     Hypertension     PAF (paroxysmal atrial fibrillation) ([facility]     tia     4 mini-strokes per pt report         SURGICAL HISTORY     Past Surgical History:   Procedure Laterality Date    CATARACT REMOVAL WITH IMPLANT Bilateral     COLONOSCOPY      ENDOSCOPY, COLON, DIAGNOSTIC      EYE SURGERY      FRACTURE SURGERY      tibia & fibula Fx (pt reports he was in highschool at the time)    HERNIA REPAIR      umbilical hernia repair    PACEMAKER PLACEMENT      PPM palpated in left upper chest wall; pt reports device as PPM, not ICD         CURRENTMEDICATIONS       Previous Medications    LEVETIRACETAM (KEPPRA) 1000 MG TABLET    Take 1 tablet by mouth 2 times daily    METOPROLOL SUCCINATE (TOPROL XL) 50 MG EXTENDED RELEASE TABLET    Take 50 mg by mouth daily    NIFEDIPINE (PROCARDIA XL) 30 MG EXTENDED RELEASE TABLET    Take 30 mg by mouth daily    ONDANSETRON (ZOFRAN ODT) 4 MG DISINTEGRATING TABLET    Take 1 tablet by mouth every 8 hours as needed for Nausea    PANTOPRAZOLE (PROTONIX) 40 MG TABLET    Take 40 mg by mouth daily    SERTRALINE (ZOLOFT) 50 MG TABLET    Take 50 mg by mouth daily    TRAZODONE (DESYREL) 50 MG TABLET    Take 50 mg by mouth nightly         ALLERGIES     Patient has no known allergies. FAMILYHISTORY     History reviewed. No pertinent family history. SOCIAL HISTORY       Social History     Tobacco Use    Smoking status: Never Smoker    Smokeless tobacco: Never Used   Substance Use Topics    Alcohol use: Yes     Alcohol/week: 2.0 standard drinks     Types: 2 Shots of liquor per week     Comment: states he last had 2 regular-sized bottles 2 weeks ago    Drug use: Never       SCREENINGS             PHYSICAL EXAM    (up to 7 for level 4, 8 or more for level 5)     ED Triage Vitals [12/01/20 1542]   BP Temp Temp src Pulse Resp SpO2 Height Weight   (!) 84/44 97.6 °F (36.4 °C) -- 151 18 98 % 5' 8.5\" (1.74 m) 172 lb (78 kg)       Physical Exam  Vitals signs and nursing note reviewed. Constitutional:       Appearance: He is well-developed. He is not diaphoretic. HENT:      Head: Normocephalic and atraumatic. Right Ear: External ear normal.      Left Ear: External ear normal.      Nose: Nose normal.   Eyes:      General:         Right eye: No discharge. Left eye: No discharge. Neck:      Musculoskeletal: Normal range of motion and neck supple. Trachea: No tracheal deviation. Cardiovascular:      Rate and Rhythm: Tachycardia present. Rhythm irregular. Heart sounds: No murmur. Comments: Trace edema to bilateral lower legs  Pulmonary:      Effort: Pulmonary effort is normal. No respiratory distress. Breath sounds: Normal breath sounds. No wheezing or rales. Abdominal:      General: Bowel sounds are normal. There is no distension. Palpations: Abdomen is soft. Tenderness:  There is no abdominal tenderness. There is no guarding. Musculoskeletal: Normal range of motion. Skin:     General: Skin is warm and dry. Neurological:      Mental Status: He is alert and oriented to person, place, and time.    Psychiatric:         Behavior: Behavior normal.         DIAGNOSTIC RESULTS   LABS:    Labs Reviewed   CBC WITH AUTO DIFFERENTIAL - Abnormal; Notable for the following components:       Result Value    RBC 3.85 (*)     Hemoglobin 11.7 (*)     Hematocrit 36.2 (*)     RDW 18.7 (*)     Monocytes Absolute 1.4 (*)     All other components within normal limits    Narrative:     Performed at:  [facility] [address],  [place]   Phone [phone]   COMPREHENSIVE METABOLIC PANEL - Abnormal; Notable for the following components:    Potassium 2.8 (*)     Chloride 97 (*)     CO2 19 (*)     Anion Gap 23 (*)     GFR Non- 60 (*)     Total Bilirubin 1.3 (*)     AST 53 (*)     All other components within normal limits    Narrative:     Vish Garay  [facility] tel[facility] [phone],  Chemistry results called to and read back by Valeria Kelly, 12/01/2020 16:35,  by [facility]   Performed at:  [facility] [address] [address],  [place]   Phone [phone]   LIPASE - Abnormal; Notable for the following components:    Lipase 61.0 (*)     All other components within normal limits    Narrative:     Vish [place]  [facility] tel. [phone],  Chemistry results called to and read back by Valeria Kelly, 12/01/2020 16:35,  by [facility]   Performed at:  [facility] [address],  [place]   Phone [phone]   TROPONIN - Abnormal; Notable for the following components:    Troponin 0.03 (*)     All other components within normal limits    Narrative:     Vish Garay  [facility] tel. [phone],  Chemistry results called to and read back by Valeria Kelly, 12/01/2020 16:35,  by [facility]   Performed at:  [facility] Radiologist below, if available at the time of this note:    XR CHEST PORTABLE   Final Result   Unchanged appearance of the chest without acute airspace disease identified. No results found. PROCEDURES   Unless otherwise noted below, none     Procedures    CRITICAL CARE TIME   Critical Care  There was a high probability of life-threatening clinical deterioration in the patient's condition requiring my urgent intervention. Total critical care time with the patient was 40  minutes excluding separately reportable procedures.   Critical care required due to patients A. fib with RVR, hypotension, were requiring shock x2, consultation cardiology, admission ICU      CONSULTS:  [address] and DIFFERENTIAL DIAGNOSIS/MDM:   Vitals:    Vitals:    12/01/20 1700 12/01/20 1705 12/01/20 1710 12/01/20 1715   BP: (!) 103/54 (!) 86/51 103/64 102/66   Pulse: 132 120 119 117   Resp: 19 15 17 23   Temp:       SpO2: 93%   97%   Weight:       Height:           Patient was given the following medications:  Medications   amiodarone (CORDARONE) 150 mg in dextrose 5 % 100 mL bolus (0 mg Intravenous Stopped 12/1/20 1720)     Followed by   amiodarone (CORDARONE) 450 mg in sodium chloride 0.9 % 250 mL infusion (1 mg/min Intravenous New Bag 12/1/20 1721)     Followed by   amiodarone (CORDARONE) 450 mg in sodium chloride 0.9 % 250 mL infusion (0.5 mg/min Intravenous Canceled Entry 12/1/20 1721)   aspirin chewable tablet 324 mg (324 mg Oral Given 12/1/20 1605)   famotidine (PEPCID) injection 20 mg (20 mg Intravenous Given 12/1/20 1605)   0.9 % sodium chloride bolus (1,000 mLs Intravenous New Bag 12/1/20 1636)   0.9 % sodium chloride bolus (0 mLs Intravenous Stopped 12/1/20 1720)   ondansetron (ZOFRAN) injection 4 mg (4 mg Intravenous Given 12/1/20 1605)   etomidate (AMIDATE) 2 MG/ML injection (  Given 12/1/20 1613)   digoxin (LANOXIN) injection 500 mcg (500 mcg Intravenous Given A. fib with RVR, and concerns of V. tach runs, patient will need to be admitted to the ICU, hospitalist has agreed for admission, the patient is stable for admission    FINAL IMPRESSION      1. Atrial fibrillation with RVR ([facility]    2. Acute alcoholic intoxication without complication ([facility]    3. [facility] ([facility]          DISPOSITION/PLAN   DISPOSITION Decision To Admit 12/01/2020 05:27:42 PM      PATIENT REFERREDTO:  No follow-up provider specified.     DISCHARGE MEDICATIONS:  New Prescriptions    No medications on file       DISCONTINUED MEDICATIONS:  Discontinued Medications    No medications on file              (Please note that portions of this note were completed with a voice recognition program.  Efforts were made to edit the dictations but occasionally words are mis-transcribed.)    Anamika Land PA-C (electronically signed)            Anamika Land PA-C  12/01/20 2327

## 2020-12-01 NOTE — ED NOTES
Pt awake and alert, states his CP is better, but he feels numb all over.      Shi Hammonds RN  12/01/20 1296

## 2020-12-01 NOTE — ED PROVIDER NOTES
I independently performed a history and physical on Sonia Strong. All diagnostic, treatment, and disposition decisions were made by myself in conjunction with the advanced practice provider. Briefly, this is a 79 y.o. male here for palpitations and chest pain. Patient states that he was drinking this weekend to celebrate his birthday when he developed chest pain and rapid heart rate that started about 2 hours prior to arrival.  The palpitations have continued and he does feel short of breath. He does endorse a history of requiring electric shocks but denies any history of A. fib. He does appear intoxicated. He states that he has missed all his medication for several days and is unable to tell me what medication he actually takes. Remainder of history is limited secondary to intoxication. On exam pt is diaphoretic   Cardiac tachycardic, irregular  Lungs clear bilaterally, no increased work of breathing  Abdomen soft nontender  Symmetric nonpitting edema to BLE  Neuro no drift in extremities       EKG  The Ekg interpreted by me in the absence of a cardiologist shows. atrial fibrillation with a rate of 154  Axis is   Normal  QTc is  normal  Intervals and Durations are unremarkable. +lateral ST depressions      EKG #2 s/p cardioversion:  afib with RVR rate of 144  No ST elevation when compared to prior  Persistent lateral ST depressions      XR CHEST PORTABLE   Final Result   Unchanged appearance of the chest without acute airspace disease identified.            Labs Reviewed   CBC WITH AUTO DIFFERENTIAL - Abnormal; Notable for the following components:       Result Value    RBC 3.85 (*)     Hemoglobin 11.7 (*)     Hematocrit 36.2 (*)     RDW 18.7 (*)     Monocytes Absolute 1.4 (*)     All other components within normal limits    Narrative:     Performed at:  [facility] [address], [place], [address] [place] [address] Drive   Phone [phone] - Abnormal; Notable for the following components:    Potassium 2.8 (*)     Chloride 97 (*)     CO2 19 (*)     Anion Gap 23 (*)     GFR Non- 60 (*)     Total Bilirubin 1.3 (*)     AST 53 (*)     All other components within normal limits    Narrative:     Jitendra Amaya  [facility] tel. [phone],  Chemistry results called to and read back by [place], 12/01/2020 16:35,  by [facility]   Performed at:  [facility] [address], [place], [facility] [email or web address]   Phone [phone]   LIPASE - Abnormal; Notable for the following components:    Lipase 61.0 (*)     All other components within normal limits    Narrative:     Jitendra Amaya  [facility] tel. [phone],  Chemistry results called to and read back by [place], 12/01/2020 16:35,  by [facility]   Performed at:  [facility] [address], [place], [facility] [email or web address]   Phone [phone]   TROPONIN - Abnormal; Notable for the following components:    Troponin 0.03 (*)     All other components within normal limits    Narrative:     Jitendra Amaya  [facility] tel. [phone],  Chemistry results called to and read back by [facility], 12/01/2020 16:35,  by [facility]   Performed at:  [facility] [address], [place], [facility] [email or web address]   Phone [phone]   PROTIME-INR - Abnormal; Notable for the following components:    Protime 14.3 (*)     INR 1.23 (*)     All other components within normal limits    Narrative:     Performed at:  [facility] [address], [place], [email or web address]   Phone [phone] - Abnormal; Notable for the following components:    Pro- (*)     All other components within normal limits    Narrative:     Jitendra Amaya  ER tel. [phone],  Chemistry results called to and read back by [place], 12/01/2020 16:35,  by [facility]   Performed at:  [facility]  [address] [facility] [place], [facility] [employer]   Phone [phone]   MAGNESIUM - Abnormal; Notable for the following components:    Magnesium 1.40 (*)     All other components within normal limits    Narrative:     Performed at:  [facility] [address] [facility] [place]   Phone [phone]   APTT    Narrative:     Performed at:  [facility] [address], [place], [address] [employer]   Phone [phone]   ETHANOL    Narrative:     Roe Mosoco  [facility] tel. [phone],  Chemistry results called to and read back by Henna Salinas, 12/01/2020 16:35,  by [facility]   Performed at:  [facility] [address], [place], [facility] [place]   Phone [phone]     Medications   amiodarone (CORDARONE) 150 mg in dextrose 5 % 100 mL bolus (0 mg Intravenous Stopped 12/1/20 1720)     Followed by   amiodarone (CORDARONE) 450 mg in sodium chloride 0.9 % 250 mL infusion (1 mg/min Intravenous New Bag 12/1/20 1721)     Followed by   amiodarone (CORDARONE) 450 mg in sodium chloride 0.9 % 250 mL infusion (0.5 mg/min Intravenous Canceled Entry 12/1/20 1721)   aspirin chewable tablet 324 mg (324 mg Oral Given 12/1/20 1605)   famotidine (PEPCID) injection 20 mg (20 mg Intravenous Given 12/1/20 1605)   0.9 % sodium chloride bolus (0 mLs Intravenous Stopped 12/1/20 1810)   0.9 % sodium chloride bolus (0 mLs Intravenous Stopped 12/1/20 1720)   ondansetron (ZOFRAN) injection 4 mg (4 mg Intravenous Given 12/1/20 1605)   etomidate (AMIDATE) 2 MG/ML injection (  Given 12/1/20 1613)   digoxin (LANOXIN) injection 500 mcg (500 mcg Intravenous Given 12/1/20 1644)   potassium chloride 10 mEq/100 mL IVPB (Peripheral Line) (10 mEq Intravenous New Bag 12/1/20 1724)   magnesium sulfate 1 g in dextrose 5% 100 mL IVPB (0 g Intravenous Stopped 12/1/20 8883)     etoh level 125  Significant hypoK   Mild trop elevation demand vs ACS    CARDIOVERSION  PROCEDURE NOTE: Electrical Cardioversion  Performed by: Rachel Hawthorne  Indication: afib with RVR and hypotension   Universal Protocol: a time out was performed and the correct patient and procedure were verified  Consent: verbal  Patient was placed on continuous cardiac monitoring and continuous pulse oximetry. Supplemental oxygen was administered via nasal cannula. Electrodes were placed in an anterior lateral fashion. After appropriate level of sedation was achieved (please see separate sedation procedure note), synchronized cardioversion was performed at 100 joules with patient remaining in afib with RVR. Procedure was then repeated at 150 J with patient continuing to remain in A. fib with RVR. Complications: None, patient tolerated the procedure well      Course and MDM:  Patient is 80-year-old male with history of A. fib with RVR presenting to the emergency room for palpitations and chest pain x2 hours. On arrival he is diaphoretic and uncomfortable appearing with tachycardia and soft blood pressure. EKG showing A. fib with RVR with lateral ST depressions. Patient admits to vomiting over the weekend, missing his metoprolol and drinking alcohol heavily, all of which are likely contributing to his RVR today. Patient was treated with IV fluids but blood pressure continued to remain low and decision was made to cardiovert as above. Cardioversion was performed twice patient remained in RVR. Repeat EKG is not showing any developing STEMI changes however persistent lateral ST depressions are noted. Cardiology was consulted and does not believe this is ACS at this time. With hypotension it was recommended the patient be started on digoxin and amiodarone with an amiodarone drip afterwards. Patient's blood pressure did improve however remains in RVR. Will require admission to the ICU for further management. He is agreeable to plan.     The total Critical Care time is 94 minutes which excludes separately billable procedures. Patient Referrals:  No follow-up provider specified. Discharge Medications:  New Prescriptions    No medications on file       FINAL IMPRESSION  1. Atrial fibrillation with RVR ([address]    2. Acute alcoholic intoxication without complication (HCC)    3. V tach (HCC)        Blood pressure 119/78, pulse 131, temperature 97.6 °F (36.4 °C), resp. rate 15, height 5' 8.5\" (1.74 m), weight 172 lb (78 kg), SpO2 97 %.      For further details of [facility] emergency department encounter, please see documentation by advanced practice provider     Stephy Crouch MD  12/01/20 5565

## 2020-12-01 NOTE — H&P
HOSPITALISTS HISTORY AND PHYSICAL    12/1/2020 6:18 PM    Patient Information:  Elodia Vale is a 79 y.o. male [id number]  PCP:  No primary care provider on file. (Tel: None )    Chief complaint:    Chief Complaint   Patient presents with    Chest Pain     Pt in by EMS from the stay [facility]. pt reports he is homeless, evicted from both the [place] and [place]. Pt reports that he was enjoying a joselyn beam and coke when he suddenly got chest pain. Per EMS he was dry heaving on the way here. Chest pressure    History of Present Illness:  Raul Cobian is a 79 y.o. male who presented with chest pain. Patient is homeless affected from [place] and [place]. He is staying at stay [facility]. Mention he was drinking Mariposa Fernandez and cope with his friends as he was celebrating his 70th birthday which turned earlier this month. In the ER patient was found to have A. fib with RVR was unstable patient was shocked twice. Cardiology was consulted and amiodarone and dig was requested. Also found to have critical hypokalemia and hypomagnesemia slight elevation of LFTs    Patient does has a history of A. fib. Patient does mention that he is taking his medicines. History obtained from patient and going over the chart  Old medical records show   History of seizure history of intracranial bleed history of pacemaker placement earlier this year  Stress test done last year December negative  History of A. fib  REVIEW OF SYSTEMS:   All other ROS negative except mentioned in [address]      Past Medical History:   has a past medical history of Alcoholism ([facility] [place] [address] Arthritis, Cardiac arrest ([facility] [place] [address] Depression, Hyperlipidemia, Hypertension, PAF (paroxysmal atrial fibrillation) ([facility] [place] [address] and tia.      Past Surgical History:   has a past surgical history that includes Cataract removal with implant (Bilateral); hernia repair; Colonoscopy; Endoscopy, colon, diagnostic; eye surgery; fracture surgery; and pacemaker placement. Medications:  No current facility-administered medications on file prior to encounter. Current Outpatient Medications on File Prior to Encounter   Medication Sig Dispense Refill    ondansetron (ZOFRAN ODT) 4 MG disintegrating tablet Take 1 tablet by mouth every 8 hours as needed for Nausea 20 tablet 0    levETIRAcetam (KEPPRA) 1000 MG tablet Take 1 tablet by mouth 2 times daily 60 tablet 3    metoprolol succinate (TOPROL XL) 50 MG extended release tablet Take 50 mg by mouth daily      NIFEdipine (PROCARDIA XL) 30 MG extended release tablet Take 30 mg by mouth daily      pantoprazole (PROTONIX) 40 MG tablet Take 40 mg by mouth daily      sertraline (ZOLOFT) 50 MG tablet Take 50 mg by mouth daily      traZODone (DESYREL) 50 MG tablet Take 50 mg by mouth nightly         Allergies:  No Known Allergies     Social History:  Patient homeless   reports that he has never smoked. He has never used smokeless tobacco. He reports current alcohol use of about 2.0 standard drinks of alcohol per week. He reports that he does not use drugs. Family History:  family history is not on file. ,  No family history of coronary artery disease    Physical Exam:  /78   Pulse 131   Temp 97.6 °F (36.4 °C)   Resp 15   Ht 5' 8.5\" (1.74 m)   Wt 172 lb (78 kg)   SpO2 98%   BMI 25.77 kg/m²   Tachycardic  Slightly distended belly  Oral mucosa is moist  General appearance:  Appears comfortable. Poorly nourished   eyes: Sclera clear, pupils equal  ENT: Dry mucus membranes, no thrush. Trachea midline.   Cardiovascular: Tachycardic irregular no murmur or gallop   respiratory: Clear to auscultation bilaterally, no wheeze, good inspiratory effort  Gastrointestinal: Abdomen soft, non-tender, slightly distended, normal bowel sounds  Musculoskeletal: No cyanosis in digits, neck supple  Neurology: Cranial nerves grossly intact. Alert and oriented in time, place and person. No speech or motor deficits  Psychiatry: Appropriate affect. Not agitated  Skin: Warm, dry, normal turgor, no rash  Brisk capillary refill, peripheral pulses palpable   Labs:  CBC:   Lab Results   Component Value Date    WBC 6.3 12/01/2020    RBC 3.85 12/01/2020    HGB 11.7 12/01/2020    HCT 36.2 12/01/2020    MCV 93.8 12/01/2020    MCH 30.3 12/01/2020    MCHC 32.3 12/01/2020    RDW 18.7 12/01/2020     12/01/2020    MPV 7.8 12/01/2020     BMP:    Lab Results   Component Value Date     12/01/2020    K 2.8 12/01/2020    K 3.9 10/16/2020    CL 97 12/01/2020    CO2 19 12/01/2020    BUN 15 12/01/2020    CREATININE 1.2 12/01/2020    CALCIUM 9.0 12/01/2020    GFRAA >60 12/01/2020    LABGLOM 60 12/01/2020    GLUCOSE 92 12/01/2020     XR CHEST PORTABLE   Final Result   Unchanged appearance of the chest without acute airspace disease identified. Chest Xray:   EKG:    I visualized CXR images and EKG strips A. fib with RVR    Discussed case  with ER provider over the phone    Problem List  Active Problems:    A-fib [facility])  Resolved Problems:    * No resolved hospital problems.  *        Assessment/Plan:   Chest pain most likely related to A. fib with RVR  Less likely ischemic, stress test negative last year December  S/p cardioversion x2 in the ER  Continue on amiodarone drip, IV dig x2  IV fluids, cardiology consulted in the ER, given patient been cardioverted twice and was unstable monitor the patient in CVICU at least overnight  A. fib might have been triggered by severe hypokalemia hypomagnesemia and alcohol  Use full dose anticoagulation    Hypokalemia and hypomagnesemia  Replacement protocol    Alcoholism with current alcohol abuse  Monitor for alcohol withdrawal, CIWA protocol    History of seizure disorder  Continue Keppra    History of intracranial hemorrhage in the past probably related to fall  History of permanent pacemaker placed earlier

## 2020-12-01 NOTE — ED NOTES
Writer noted runs of VTACH on monitor, HR noted to be in the 180's, BP still 100/60. Pedro León MD notified.      Marilee [place] RN  12/01/20 7983

## 2020-12-02 ENCOUNTER — NURSE ONLY (OUTPATIENT)
Dept: CARDIOLOGY CLINIC | Age: 70
End: 2020-12-02
Payer: MEDICARE

## 2020-12-02 VITALS
SYSTOLIC BLOOD PRESSURE: 129 MMHG | OXYGEN SATURATION: 94 % | HEART RATE: 77 BPM | BODY MASS INDEX: 27.66 KG/M2 | DIASTOLIC BLOOD PRESSURE: 68 MMHG | HEIGHT: 69 IN | WEIGHT: 186.73 LBS | RESPIRATION RATE: 20 BRPM | TEMPERATURE: 98.9 F

## 2020-12-02 LAB
A/G RATIO: 1.3 (ref 1.1–2.2)
ALBUMIN SERPL-MCNC: 4.4 G/DL (ref 3.4–5)
ALP BLD-CCNC: 75 U/L (ref 40–129)
ALT SERPL-CCNC: 19 U/L (ref 10–40)
ANION GAP SERPL CALCULATED.3IONS-SCNC: 14 MMOL/L (ref 3–16)
ANION GAP SERPL CALCULATED.3IONS-SCNC: 14 MMOL/L (ref 3–16)
AST SERPL-CCNC: 61 U/L (ref 15–37)
BILIRUB SERPL-MCNC: 1.4 MG/DL (ref 0–1)
BUN BLDV-MCNC: 17 MG/DL (ref 7–20)
BUN BLDV-MCNC: 17 MG/DL (ref 7–20)
C DIFF TOXIN/ANTIGEN: NORMAL
CALCIUM SERPL-MCNC: 8.1 MG/DL (ref 8.3–10.6)
CALCIUM SERPL-MCNC: 8.2 MG/DL (ref 8.3–10.6)
CHLORIDE BLD-SCNC: 103 MMOL/L (ref 99–110)
CHLORIDE BLD-SCNC: 104 MMOL/L (ref 99–110)
CO2: 21 MMOL/L (ref 21–32)
CO2: 22 MMOL/L (ref 21–32)
CREAT SERPL-MCNC: 1.2 MG/DL (ref 0.8–1.3)
CREAT SERPL-MCNC: 1.2 MG/DL (ref 0.8–1.3)
EKG ATRIAL RATE: 69 BPM
EKG ATRIAL RATE: 75 BPM
EKG ATRIAL RATE: 85 BPM
EKG DIAGNOSIS: NORMAL
EKG P AXIS: 66 DEGREES
EKG P-R INTERVAL: 156 MS
EKG Q-T INTERVAL: 276 MS
EKG Q-T INTERVAL: 330 MS
EKG Q-T INTERVAL: 398 MS
EKG QRS DURATION: 80 MS
EKG QRS DURATION: 90 MS
EKG QRS DURATION: 92 MS
EKG QTC CALCULATION (BAZETT): 426 MS
EKG QTC CALCULATION (BAZETT): 442 MS
EKG QTC CALCULATION (BAZETT): 510 MS
EKG R AXIS: -12 DEGREES
EKG R AXIS: -9 DEGREES
EKG R AXIS: 0 DEGREES
EKG T AXIS: -21 DEGREES
EKG T AXIS: -4 DEGREES
EKG T AXIS: -66 DEGREES
EKG VENTRICULAR RATE: 144 BPM
EKG VENTRICULAR RATE: 154 BPM
EKG VENTRICULAR RATE: 69 BPM
GFR AFRICAN AMERICAN: >60
GFR AFRICAN AMERICAN: >60
GFR NON-AFRICAN AMERICAN: 60
GFR NON-AFRICAN AMERICAN: 60
GLOBULIN: 3.3 G/DL
GLUCOSE BLD-MCNC: 97 MG/DL (ref 70–99)
GLUCOSE BLD-MCNC: 98 MG/DL (ref 70–99)
HCT VFR BLD CALC: 35.3 % (ref 40.5–52.5)
HEMOGLOBIN: 11.4 G/DL (ref 13.5–17.5)
MAGNESIUM: 1.6 MG/DL (ref 1.8–2.4)
MCH RBC QN AUTO: 30.1 PG (ref 26–34)
MCHC RBC AUTO-ENTMCNC: 32.2 G/DL (ref 31–36)
MCV RBC AUTO: 93.5 FL (ref 80–100)
PDW BLD-RTO: 18.3 % (ref 12.4–15.4)
PLATELET # BLD: 171 K/UL (ref 135–450)
PMV BLD AUTO: 8.1 FL (ref 5–10.5)
POTASSIUM REFLEX MAGNESIUM: 4.8 MMOL/L (ref 3.5–5.1)
POTASSIUM REFLEX MAGNESIUM: 4.8 MMOL/L (ref 3.5–5.1)
RBC # BLD: 3.78 M/UL (ref 4.2–5.9)
SODIUM BLD-SCNC: 138 MMOL/L (ref 136–145)
SODIUM BLD-SCNC: 140 MMOL/L (ref 136–145)
TOTAL PROTEIN: 7.7 G/DL (ref 6.4–8.2)
WBC # BLD: 7.8 K/UL (ref 4–11)

## 2020-12-02 PROCEDURE — 93010 ELECTROCARDIOGRAM REPORT: CPT | Performed by: INTERNAL MEDICINE

## 2020-12-02 PROCEDURE — 6360000002 HC RX W HCPCS: Performed by: INTERNAL MEDICINE

## 2020-12-02 PROCEDURE — 36415 COLL VENOUS BLD VENIPUNCTURE: CPT

## 2020-12-02 PROCEDURE — 93280 PM DEVICE PROGR EVAL DUAL: CPT | Performed by: INTERNAL MEDICINE

## 2020-12-02 PROCEDURE — 93005 ELECTROCARDIOGRAM TRACING: CPT | Performed by: INTERNAL MEDICINE

## 2020-12-02 PROCEDURE — 80053 COMPREHEN METABOLIC PANEL: CPT

## 2020-12-02 PROCEDURE — 2580000003 HC RX 258: Performed by: INTERNAL MEDICINE

## 2020-12-02 PROCEDURE — 6370000000 HC RX 637 (ALT 250 FOR IP): Performed by: INTERNAL MEDICINE

## 2020-12-02 PROCEDURE — 85027 COMPLETE CBC AUTOMATED: CPT

## 2020-12-02 PROCEDURE — 83735 ASSAY OF MAGNESIUM: CPT

## 2020-12-02 PROCEDURE — 87449 NOS EACH ORGANISM AG IA: CPT

## 2020-12-02 PROCEDURE — 99223 1ST HOSP IP/OBS HIGH 75: CPT | Performed by: INTERNAL MEDICINE

## 2020-12-02 PROCEDURE — 87324 CLOSTRIDIUM AG IA: CPT

## 2020-12-02 RX ORDER — AMIODARONE HYDROCHLORIDE 200 MG/1
200 TABLET ORAL DAILY
Status: DISCONTINUED | OUTPATIENT
Start: 2020-12-02 | End: 2020-12-02 | Stop reason: HOSPADM

## 2020-12-02 RX ORDER — MAGNESIUM SULFATE 1 G/100ML
1 INJECTION INTRAVENOUS ONCE
Status: COMPLETED | OUTPATIENT
Start: 2020-12-02 | End: 2020-12-02

## 2020-12-02 RX ORDER — METOPROLOL SUCCINATE 50 MG/1
100 TABLET, EXTENDED RELEASE ORAL DAILY
Status: DISCONTINUED | OUTPATIENT
Start: 2020-12-02 | End: 2020-12-02 | Stop reason: HOSPADM

## 2020-12-02 RX ORDER — METOPROLOL SUCCINATE 50 MG/1
50 TABLET, EXTENDED RELEASE ORAL DAILY
Qty: 30 TABLET | Refills: 3 | Status: SHIPPED | OUTPATIENT
Start: 2020-12-02

## 2020-12-02 RX ORDER — AMIODARONE HYDROCHLORIDE 200 MG/1
200 TABLET ORAL DAILY
Qty: 15 TABLET | Refills: 0 | Status: SHIPPED | OUTPATIENT
Start: 2020-12-03

## 2020-12-02 RX ORDER — POTASSIUM CHLORIDE 20 MEQ/1
40 TABLET, EXTENDED RELEASE ORAL ONCE
Status: COMPLETED | OUTPATIENT
Start: 2020-12-02 | End: 2020-12-02

## 2020-12-02 RX ADMIN — POTASSIUM CHLORIDE 40 MEQ: 1500 TABLET, EXTENDED RELEASE ORAL at 00:28

## 2020-12-02 RX ADMIN — LEVETIRACETAM 1000 MG: 500 TABLET, FILM COATED ORAL at 00:34

## 2020-12-02 RX ADMIN — DIGOXIN 250 MCG: 0.25 INJECTION INTRAMUSCULAR; INTRAVENOUS at 00:28

## 2020-12-02 RX ADMIN — TRAZODONE HYDROCHLORIDE 50 MG: 50 TABLET ORAL at 00:28

## 2020-12-02 RX ADMIN — ENOXAPARIN SODIUM 80 MG: 80 INJECTION SUBCUTANEOUS at 00:29

## 2020-12-02 RX ADMIN — SERTRALINE HYDROCHLORIDE 50 MG: 50 TABLET ORAL at 08:32

## 2020-12-02 RX ADMIN — METOPROLOL SUCCINATE 100 MG: 50 TABLET, EXTENDED RELEASE ORAL at 00:34

## 2020-12-02 RX ADMIN — AMIODARONE HYDROCHLORIDE 200 MG: 200 TABLET ORAL at 08:32

## 2020-12-02 RX ADMIN — POTASSIUM CHLORIDE 10 MEQ: 7.46 INJECTION, SOLUTION INTRAVENOUS at 02:05

## 2020-12-02 RX ADMIN — POTASSIUM CHLORIDE 10 MEQ: 7.46 INJECTION, SOLUTION INTRAVENOUS at 00:58

## 2020-12-02 RX ADMIN — LEVETIRACETAM 1000 MG: 500 TABLET, FILM COATED ORAL at 08:32

## 2020-12-02 RX ADMIN — ENOXAPARIN SODIUM 80 MG: 80 INJECTION SUBCUTANEOUS at 08:32

## 2020-12-02 RX ADMIN — MAGNESIUM SULFATE HEPTAHYDRATE 1 G: 1 INJECTION, SOLUTION INTRAVENOUS at 09:29

## 2020-12-02 RX ADMIN — Medication 10 ML: at 08:33

## 2020-12-02 ASSESSMENT — PAIN SCALES - GENERAL
PAINLEVEL_OUTOF10: 0

## 2020-12-02 NOTE — CARE COORDINATION
[facility] Case Management Discharge Planning Assessment     RN/SW discharge planner met with patient (and family member) to discuss reason for admission, current living situation, and potential needs at the time of discharge    Insurance/Demographics Verified:  [address]   [] Apartment [] Condo [x] Hotel [] Homeless [] House [] Shelter   []  SNF           []  LTC     Confirmed w/:   Living w/: [x] Alone [] Children [] Parent [] Spouse   Patient states he lost his house in a divorce and living in a hotel. [facility] Care Provider / [facility]   [facility]    Specialty Providers   [] [facility] Consults           Level of Functioning / DME   [x] no DME [x] Independent w/ ADLs [] Dependent w/ ADLs   [] BiPAP/CPAP [] BSC [] Cane [] Rollator   [] Hospital Bed [] Home O2   w/   [] Scooter [] Shower Chair [] Ronnald Real [] Wheelchair        Current [employer]   [x] n/a  []  CoA: w/: [] HD [] PD   [] HHC                                           [] HHC Aide   [] RN   [] PT   [] OT   [] SW         Medication compliance issues: none identified. [facility] issues that could impact healthcare:  Patient is requesting the [place] set him up in a 2-bedroom home. Discussed and provided facilities of choice if transition to a skilled nursing facility is required at the time of discharge. Tentative Discharge Planning   [] Acute Rehab [x] Home Alone [] Home w/ Family   [] [facility]        [] [place]   []  RN   [] PT   [] OT   [] SW   [] Hospice [] LTAC/Select   [] SNF /  [] LTC        Discussed with patient and/or family that on the day of discharge home tentative time of discharge will be between 10 AM and noon. Transportation at the time of discharge:  Per [place] back to [place], [place] resources and contacts provided to patient.     FRANKLIN MunguiaN, RN   RN Case Manager  [facility] Case Management  [phone]

## 2020-12-02 NOTE — ED NOTES
Report called to CVU, RN verbalized understanding and denied any need for further information, patient placed on lifepak and transported to unit , amiodarone infusing at time of transport      Karin Mahajan RN  12/01/20 [id number]

## 2020-12-02 NOTE — PROGRESS NOTES
Reason for Visit: [facility], Advance Directives    Summary:  met w/pt at bedside to discuss HPOA and LW documents. Pt directed conversation and appeared not interested in having conversation about AD Documents. After conversing w/pt's nurse, pt appears inappropriate to have further HPOA and LW conversation at this time. Recommendations: No immediate follow-up expected at this time. Should needs arise, please contact spiritual care.

## 2020-12-02 NOTE — PROGRESS NOTES
He can be discharged home from EP standpoint on PO amiodarone   I will have him f/u in the [place] to make sure he is compliant.   Advised him to stop drinking

## 2020-12-02 NOTE — PROGRESS NOTES
Cardiology coordinator contacted regarding follow up visit at [address] [place] [address] for EP studies. Dr Chano Leblanc told patient that he would make the arrangements.  No appointment noted on AVS.

## 2020-12-02 NOTE — DISCHARGE SUMMARY
[facility]    [facility] Demographics    Patient. Jas Monson  Date of Birth. 1950  MRN. [id number]     Primary care provider. No primary care provider on file. (Tel: None)    Admit date: 12/1/2020    Discharge date (blank if same as Note Date): Note Date: 12/2/2020     Reason for Hospitalization. Chief Complaint   Patient presents with    Chest Pain     Pt in by EMS from the stay [address]. pt reports he is homeless, evicted from both the [place] and [place]. Pt reports that he was enjoying a joselyn beam and coke when he suddenly got chest pain. Per EMS he was dry heaving on the way here. Significant Findings. Active Problems:    Atrial fibrillation with RVR (HCC)    NSVT (nonsustained ventricular tachycardia) (HCC)    Traumatic hemorrhage of right cerebrum with loss of consciousness (HCC)    Hypomagnesemia    Alcohol abuse  Resolved Problems:    * No resolved hospital problems. *       Problems and results from this hospitalization that need follow up. A. fib  Hypokalemia  Hypomagnesemia  Significant test results and incidental findings. 1.   XR CHEST PORTABLE   Final Result   Unchanged appearance of the chest without acute airspace disease identified. Invasive procedures and treatments. Cardioversion x2 in the ED  [facility] Course. Patient with a history of A. fib, alcohol abuse presented to the ER with chest pain was found to be in A. fib with RVR was unstable was shocked twice in the ER. Patient was put on amiodarone drip patient did respond to that patient was in sinus rhythm next day. Patient was evaluated by cardiology. As patient had a history of brain bleed and concerns of noncompliance cardiology recommended not to do anticoagulation at this time and will be discharged on amiodarone along with his beta-blocker. Patient strongly advised to quit drinking.   Patient did had significant hypokalemia and hypomagnesemia on admission which might have contributed to A. fib. Electrolytes were replaced. Consults. IP CONSULT TO CARDIOLOGY  IP CONSULT TO CARDIOLOGY  IP CONSULT TO SOCIAL WORK  IP CONSULT TO SPIRITUAL SERVICES  IP CONSULT TO CASE MANAGEMENT  IP CONSULT TO SOCIAL WORK  IP CONSULT TO DIETITIAN  IP CONSULT TO FINANCIAL COUNSELOR    Physical examination on discharge day. /68   Pulse 77   Temp 98.9 °F (37.2 °C) (Temporal)   Resp 20   Ht 5' 8.5\" (1.74 m)   Wt 186 lb 11.7 oz (84.7 kg)   SpO2 94%   BMI 27.98 kg/m²   General appearance. Alert. Looks comfortable. HEENT. Sclera clear. Moist mucus membranes. Cardiovascular. Regular rate and rhythm, normal S1, S2. No murmur. Respiratory. Not using accessory muscles. Clear to auscultation bilaterally, no wheeze. Gastrointestinal. Abdomen soft, non-tender, not distended, normal bowel sounds  Neurology. Facial symmetry. No speech deficits. Moving all extremities equally. Extremities. No edema in lower extremities. Skin. Warm, dry, normal turgor        Condition at time of discharge stable    Medication instructions provided to patient at discharge.      Medication List      START taking these medications    amiodarone 200 MG tablet  Commonly known as:  CORDARONE  Take 1 tablet by mouth daily  Start taking on:  December 3, 2020  Notes to patient:  Use: Restore a regular heart rhythm, lower heart rate  Side effects: upset stomach, constipation, dizziness, headacheamio        CONTINUE taking these medications    levETIRAcetam 1000 MG tablet  Commonly known as:  KEPPRA  Take 1 tablet by mouth 2 times daily  Notes to patient:  Use: Treatment of seizures  Side effects: Diarrhea, fatigue, rhinitis     metoprolol succinate 50 MG extended release tablet  Commonly known as:  TOPROL XL  Take 1 tablet by mouth daily  Notes to patient:  Use:  Lowers blood pressure and heart rate, takes workload off heart  Side effects:  Tiredness, shortness of breath, trouble sleeping, impotence     NIFEdipine 30 MG extended release tablet  Commonly known as:  PROCARDIA XL  Notes to patient:  Use:  Relax blood vessels and increase the supply of blood and oxygen to the heart while also reducing the heart's workload   Side effects:  Lightheadedness,low blood pressure,slower heart rate, drowsiness and  swelling of feet ankles and legs        ondansetron 4 MG disintegrating tablet  Commonly known as:  Zofran ODT  Take 1 tablet by mouth every 8 hours as needed for Nausea  Notes to patient:  Use: nausea and vomiting  Side effects: headache, weakness or dizziness     sertraline 50 MG tablet  Commonly known as:  ZOLOFT  Notes to patient:  Use: Treatment of depression or anxiety disorders  Side Effects: Fatigue, stomach upset, constipation, insomnia, chest pain, weight gain     traZODone 50 MG tablet  Commonly known as:  DESYREL  Notes to patient:  Use: Treatment of depressive disorder  Side effects: Fatigue, constipation, insomnia        STOP taking these medications    pantoprazole 40 MG tablet  Commonly known as:  PROTONIX  Notes to patient:  STOP TAKING           Where to Get Your Medications      These medications were sent to [facility], [address] [place] [address]    Phone:  [phone]   · amiodarone 200 MG tablet  · metoprolol succinate 50 MG extended release tablet         Discharge recommendations given to patient. Follow Up. PCP and cardiology  Disposition. home  Activity. activity as tolerated  Diet: DIET CARDIAC;      Spent 35 minutes in discharge process.     Signed:  Jude Del Toro MD     12/2/2020 2:03 PM

## 2020-12-02 NOTE — PROGRESS NOTES
SBP now 118-144. Remains in Afib RVR, rate 130s. Takes Toprol XL 50mg daily at home. Nausea resolved. Hospitalist paged regarding restarting beta blocker.

## 2020-12-02 NOTE — PROGRESS NOTES
Discharge instructions given to patient. Discussed new medications uses and side effects. Dr. Stephen Liu discussed follow up appointment with cardiology at   [place]. [facility] and time of follow up appointment. Patient verbalizes understanding. Continues to become irritated during instructions.  called. Taken to ER per wheelchair and taken to hotel per Manjit Barcenas.

## 2020-12-02 NOTE — PROGRESS NOTES
Xarelto Counseling Note    Raul Cobian was counseled on Xarelto for Afib. Duration of therapy: indefinitely    Indication, duration of therapy, and signs/symptoms related to bleeding were discussed. Raul Cobian had opportunity to ask questions. No barriers to education were noted.     Rosita Nuno, PharmD, BCPS  Clinical Pharmacist  [phone]

## 2020-12-02 NOTE — PROGRESS NOTES
[facility] [place]  Rep Checked Device   Device shows normal function

## 2020-12-02 NOTE — PROGRESS NOTES
Perfect serve response received to previous page to hospitalist indicating a Metoprolol dose was ordered. Metoprolol order not in chart. Order noted for 2gm IV Mag to be given. Pt received 1gm of Mag in ED. K+ 2.8 on arrival. Received 10 mEq in ED; slated for 5 more doses, each over one hour through PIV. Paged hospitalist to clarify orders, request po potassium to speed replacement process.

## 2020-12-02 NOTE — CONSULTS
Nutrition Assessment     Type and Reason for Visit: Initial, Positive Nutrition Screen    Nutrition Recommendations/Plan:   Continue current diet    Nutrition Assessment:  Pt triggered nutrition eval for admission MST 5, indicating wt loss and poor po intake. Pt tells two different stories- one, wt loss started when he retired from the [employer] and two, wt loss has been intentional because he is eating baked pork and salads. Pt recenty had his tongue biopsied, this has not affected chewing or swallowing. Good PO intake since admission. Will follow at low risk.     Current Nutrition Therapies:    DIET CARDIAC;    Nutrition Diagnosis:   No nutrition diagnosis at this time     Nutrition Interventions:   Food and/or Nutrient Delivery:  Continue Current Diet  Nutrition Education/Counseling:  Education not indicated   Coordination of Nutrition Care:  No recommendation at this time      Nutrition Monitoring and Evaluation:   Behavioral-Environmental Outcomes:  None Identified   Food/Nutrient Intake Outcomes:  Food and Nutrient Intake  Physical Signs/Symptoms Outcomes:  None Identified     Discharge Planning:    No discharge needs at this time     Electronically signed by Ender Branch RD, LD on 12/2/20 at 1:15 PM EST  Contact: [phone]

## 2020-12-02 NOTE — PROGRESS NOTES
Continue to wait for follow up appointment from Dr David Helms at [address] [place] [address]

## 2020-12-02 NOTE — CONSULTS
[address]   Electrophysiology Consultation   Date: 12/2/2020  Reason for Consultation: Paroxysmal atrial fibrillation  Consult Requesting Physician: Alexander Hodgkins, MD     Chief Complaint   Patient presents with    Chest Pain     Pt in by EMS from the [address]. pt reports he is homeless, evicted from both the [place] and [place]. Pt reports that he was enjoying a joselyn beam and coke when he suddenly got chest pain. Per EMS he was dry heaving on the way here. HPI: Gregory Martinez is a 79 y.o. male with past medical history of atrial fibrillation, diarrhea, seizure disorder, and alcohol abuse. He was admitted after he was drinking over the weekend his friends to celebrate his birthday and he started having chest tightness tingling in both arms and shortness of breath. In the emergency room she was found to be in atrial fibrillation with RVR and also with a potassium of 2.6 and low magnesium. He has not taken his medication for several days. He was shocked in the ER and started on amiodarone. He was previously on amiodarone for his atrial fibrillation but it was a stopped several years ago. In May 2019 he seems to have gotten a pacemaker implant at the [facility]. He has had also history of cardiac arrest which was thought to be also due to alcohol intoxication hypomagnesemia in 2016. He also has had episodes of vomiting and explosive diarrhea that happens every few months. The last one was a week ago.   Past Medical History:   Diagnosis Date    [facility]     Alcoholism ([facility]     Anemia     Anxiety     Arthritis     Atrial fibrillation (HCC)     Cardiac arrest ([facility] 08/2016    Cataracts, bilateral     Colitis     Depression     Depression     Duodenitis     Encephalopathy     Enteritis     Erectile dysfunction     Gastritis     Gastroenteritis     GERD (gastroesophageal reflux disease)     GI bleed     Hemorrhoids     Hepatic cyst or incontinence  · Musculoskeletal: negative for - Joint swelling, muscle pain  · Neurological: negative for - confusion, dizziness, headaches   · Psychiatric: No anxiety, no depression. · Dermatological: negative for - rash    Physical Examination:  Vitals:    20 0525   BP:    Pulse:    Resp:    Temp: 99.7 °F (37.6 °C)   SpO2:       In: 240 [P.O.:240]  Out: -    Wt Readings from Last 3 Encounters:   20 186 lb 11.7 oz (84.7 kg)   10/16/20 174 lb (78.9 kg)   20 170 lb 10.2 oz (77.4 kg)     Temp  Av.8 °F (37.1 °C)  Min: 97.6 °F (36.4 °C)  Max: 99.7 °F (37.6 °C)  Pulse  Av.6  Min: 76  Max: 176  BP  Min: 74/38  Max: 164/102  SpO2  Av.4 %  Min: 89 %  Max: 99 %    Intake/Output Summary (Last 24 hours) at 2020 9996  Last data filed at 2020 0028  Gross per 24 hour   Intake 240 ml   Output --   Net 240 ml       · Telemetry: Sinus rhythm nonsustained VT  · Constitutional: Oriented. No distress. · Head: Normocephalic and atraumatic. · Mouth/Throat: Oropharynx is clear and moist.   · Eyes: Conjunctivae normal. EOM are normal.   · Neck: Neck supple. No rigidity. No JVD present. · Cardiovascular: Normal rate, regular rhythm, S1&S2. · Pulmonary/Chest: Bilateral respiratory sounds. No wheezes, No rhonchi. · Abdominal: Soft. Bowel sounds present. No distension, No tenderness. · Musculoskeletal: No tenderness. No edema    · Lymphadenopathy: Has no cervical adenopathy. · Neurological: Alert and oriented. Cranial nerve appears intact, No Gross deficit   · Skin: Skin is warm and dry. No rash noted. · Psychiatric: Has a normal behavior     Labs, diagnostic and imaging results reviewed. Reviewed.    Recent Labs     20  1555 20  0615    138  140   K 2.8* 4.8  4.8   CL 97* 103  104   CO2 19* 21  22   BUN 15 17  17   CREATININE 1.2 1.2  1.2     Recent Labs     20  1555 20  0615   WBC 6.3 7.8   HGB 11.7* 11.4*   HCT 36.2* 35.3*   MCV 93.8 93.5    171     Lab Results   Component Value Date    CKTOTAL 296 03/11/2020    TROPONINI 0.03 12/01/2020     No results found for: BNP  Lab Results   Component Value Date    PROTIME 14.3 12/01/2020    PROTIME 14.3 10/16/2020    PROTIME 13.7 03/12/2020    INR 1.23 12/01/2020    INR 1.23 10/16/2020    INR 1.18 03/12/2020     Lab Results   Component Value Date    CHOL 115 05/16/2020    HDL 33 05/16/2020    TRIG 89 05/16/2020       ECG: Atrial fibrillation with RVR  Echo: 12/2019   Conclusions      Summary   -Normal left ventricle size, wall thickness, and systolic function with an   estimated ejection fraction of 70%.   -No regional wall motion abnormalities are seen. -LVOT gradient of 9 mmHg maximum and mean of 5 mmhg with valsalva the   maximum gradient increases to 40 mmHg with a mean gradient of 14 mmHg. -Diastolic filling parameters suggest grade I diastolic dysfunction.   -Trivial tricuspid regurgitation.   -The aortic root is normal in size. There appears to calcification and   plaque in the aortic root.   Cath:   Stress test, December 2019  Conclusions          Summary     *No EKG evidence for ischemia with lexiscan     *Normal LV size and systolic function.     *There is normal isotope uptake at stress and rest. There is no evidence of     myocardial ischemia or scar.             Scheduled Meds:   metoprolol succinate  100 mg Oral Daily    levETIRAcetam  1,000 mg Oral BID    sertraline  50 mg Oral Daily    traZODone  50 mg Oral Nightly    sodium chloride flush  10 mL Intravenous 2 times per day    enoxaparin  1 mg/kg Subcutaneous BID    digoxin  250 mcg Intravenous Q8H     Continuous Infusions:   amiodarone 0.5 mg/min (12/01/20 2900)    sodium chloride 100 mL/hr at 12/01/20 2255     PRN Meds:.sodium chloride flush, acetaminophen **OR** acetaminophen, polyethylene glycol, promethazine **OR** ondansetron, potassium chloride **OR** potassium alternative oral replacement **OR** potassium chloride     Patient Active Problem List    Diagnosis Date Noted    A-fib [facility]) 12/01/2020    Anemia 05/15/2020    Failure to thrive in adult 05/15/2020    Acute alteration in mental status 05/15/2020    [facility]acranial hemorrhage [facility] 03/11/2020    Acute respiratory failure with hypoxia and hypercapnia ([facility] 03/11/2020    Essential hypertension 03/11/2020    Acute encephalopathy     Hypokalemia     New onset seizure ([facility] 02/12/2020    GERD (gastroesophageal reflux disease) 12/05/2019    Depression 12/05/2019    Elevated d-dimer 12/05/2019    NSTEMI (non-ST elevated myocardial infarction) ([facility] 12/04/2019      Active Hospital Problems    Diagnosis Date Noted   [facility] Ca[facility]) [I48.91] 12/01/2020       Assessment:       Plan:    - paroxysmal atrial fibrillation      He has had episodes of atrial fibrillation based on the last interrogation that I have available from 2019. This time around also the episode seems to be in the context of alcohol abuse and severe electrolyte abnormalities and perhaps dehydration from nausea and vomiting preceding it. I had a long discussion about the need for him to be off alcohol, eat properly, lose weight, and take his medications. He had what was reported as   intracranial hemorrhage/intraparenchymal hemorrhage of unknown etiology in March 2020. Therefore I would be hesitant to start anticoagulation at this point. I would continue amiodarone to help suppress his episodes of atrial fibrillation and reduce risk of bleeding/stroke. In the long run we may consider watchman if he can take anticoagulation for short period of time. As far as management of atrial fibrillation goes, amiodarone for now will be an acceptable option although he has very mild liver abnormality which will need to be followed.   Later ablation of atrial fibrillation can be considered with a short-term anticoagulation afterwards.        -Hypokalemia and hypomagnesemia  Replace and monitor. I discussed alcohol abstinence.    -Alcohol abuse  I had a long discussion about abstinence.        -History of intra cerebral hemorrhage   He had right parietal hemorrhage in March 2020. At the time it was not clear what the etiology of it was, whether it was due to fall or was a spontaneous bleed. As per neurosurgery notes he was to be off of any anticoagulation for 2 weeks at the time. However given the uncertainty about his intracranial bleed etiology, I would refrain from using anticoagulation at this point.    -Permanent pacemaker   We will interrogate       -Diarrhea and vomiting  He has episodes of severe diarrhea described as explosive every few months. This may require some working up by GI.    -Nonsustained VT  2 short runs were seen on telemetry here. It could be in the context of abnormal electrolytes. He had a normal stress test and normal ejection fraction in December. Resumption of beta-blocker and being on amiodarone will help. I independently reviewed  *CXR     Thank you for allowing me to participate in the care of Ana Del Toro     NOTE: This report was transcribed using voice recognition software. Every effort was made to ensure accuracy, however, inadvertent computerized transcription errors may be present.

## 2020-12-02 NOTE — PROGRESS NOTES
Dr Laura Marquez spoke with cardiology at [place] and they will call the patient regarding follow up appointment. OK to discharge patient.

## 2020-12-02 NOTE — PROGRESS NOTES
CLINICAL PHARMACY NOTE: MEDS TO [address] Select Patient?: No  Total # of Prescriptions Filled: 2   The following medications were delivered to the patient:  · Metoprolol ER 50mg  · Amiodarone 200mg  Total # of Interventions Completed: 0  Time Spent (min): 15    Additional Documentation:  Delivered to Patient  Sudhir Giordano CPhT

## 2024-02-28 NOTE — ED NOTES
Respiratory at bedside, to bag him. Dr. Shubham Edge preparing to intubate.       Richard Snyder, RN  03/11/20 4335 [Dear  ___] : Dear ~SHAILESH, [Consult Letter:] : I had the pleasure of evaluating your patient, [unfilled]. [Please see my note below.] : Please see my note below. [Consult Closing:] : Thank you very much for allowing me to participate in the care of this patient.  If you have any questions, please do not hesitate to contact me. [Sincerely,] : Sincerely, [[email or web address]] : Chelsey Lyn MD  of Medicine [facility]